# Patient Record
Sex: MALE | Race: WHITE | NOT HISPANIC OR LATINO | Employment: OTHER | URBAN - METROPOLITAN AREA
[De-identification: names, ages, dates, MRNs, and addresses within clinical notes are randomized per-mention and may not be internally consistent; named-entity substitution may affect disease eponyms.]

---

## 2017-02-08 PROBLEM — Z95.5 S/P CORONARY ARTERY STENT PLACEMENT: Status: ACTIVE | Noted: 2017-02-08

## 2017-02-08 PROBLEM — E10.9 DIABETES MELLITUS TYPE 1, CONTROLLED, INSULIN DEPENDENT (HCC): Status: ACTIVE | Noted: 2017-02-08

## 2017-02-08 PROBLEM — Z96.41 INSULIN PUMP IN PLACE: Status: ACTIVE | Noted: 2017-02-08

## 2017-02-08 PROBLEM — N40.0 PROSTATISM: Status: ACTIVE | Noted: 2017-02-08

## 2017-02-10 PROBLEM — E10.69 DM TYPE 1 WITH DIABETIC MIXED HYPERLIPIDEMIA (HCC): Status: ACTIVE | Noted: 2017-02-10

## 2017-02-10 PROBLEM — E03.9 HYPOTHYROIDISM: Status: ACTIVE | Noted: 2017-02-10

## 2017-02-10 PROBLEM — E78.2 DM TYPE 1 WITH DIABETIC MIXED HYPERLIPIDEMIA (HCC): Status: ACTIVE | Noted: 2017-02-10

## 2017-02-24 ENCOUNTER — OFFICE VISIT (OUTPATIENT)
Dept: ENDOCRINOLOGY | Facility: MEDICAL CENTER | Age: 62
End: 2017-02-24
Payer: COMMERCIAL

## 2017-02-24 VITALS
OXYGEN SATURATION: 94 % | DIASTOLIC BLOOD PRESSURE: 82 MMHG | HEIGHT: 69 IN | WEIGHT: 188 LBS | HEART RATE: 71 BPM | SYSTOLIC BLOOD PRESSURE: 144 MMHG | BODY MASS INDEX: 27.85 KG/M2

## 2017-02-24 DIAGNOSIS — Z79.4 ENCOUNTER FOR LONG-TERM (CURRENT) USE OF INSULIN (HCC): ICD-10-CM

## 2017-02-24 DIAGNOSIS — Z96.41 PRESENCE OF INSULIN PUMP: ICD-10-CM

## 2017-02-24 DIAGNOSIS — E10.9 DIABETES MELLITUS TYPE 1, CONTROLLED, INSULIN DEPENDENT (HCC): ICD-10-CM

## 2017-02-24 DIAGNOSIS — Z46.81 FITTING AND ADJUSTMENT OF INSULIN PUMP: ICD-10-CM

## 2017-02-24 PROCEDURE — 99204 OFFICE O/P NEW MOD 45 MIN: CPT | Performed by: PHYSICIAN ASSISTANT

## 2017-02-24 RX ORDER — MULTIVIT-MIN/IRON/FOLIC ACID/K 18-600-40
CAPSULE ORAL
COMMUNITY
End: 2020-09-16

## 2017-02-24 NOTE — MR AVS SNAPSHOT
"        Amaury Garner   2017 4:20 PM   Office Visit   MRN: [id number]    Department:  Endocrinology [facility]   Dept Phone:  [phone]    Description:  Male : 1955   Provider:  Celio Strong PA-C           Reason for Visit     New Patient           Allergies as of 2017     No Known Allergies      You were diagnosed with     Diabetes mellitus type 1, controlled, insulin dependent (CMS-HCC)   [4904284]       Encounter for long-term (current) use of insulin (CMS-HCC)   [V58.67.ICD-9-CM]       Fitting and adjustment of insulin pump   [V53.91.ICD-9-CM]       Presence of insulin pump   [247272]         Vital Signs     Blood Pressure Pulse Height Weight Body Mass Index Oxygen Saturation    144/82 mmHg 71 1.753 m (5' 9.02\") 85.276 kg (188 lb) 27.75 kg/m2 94%    Smoking Status                   Never Smoker            Basic Information     Date Of Birth Sex Race Ethnicity Preferred Language    1955 Male White Non- English      Your appointments     2017  1:00 PM   NEW PATIENT with Brennen Tenorio M.D.   [facility] (--[facility]    [address] [id number] [phone]            Mar 08, 2017 10:30 AM   30 Minute with Calos Ingram M.D.   [facility] (--)    [address] [place] [id number] [phone]              Problem List              ICD-10-CM Priority Class Noted - Resolved    Diabetes mellitus type 1, controlled, insulin dependent (CMS-[facility]) E10.9   2017 - Present    Prostatism N40.0   2017 - Present    Insulin pump in place Z96.41   2017 - Present    S/P coronary artery stent placement Z95.5   2017 - Present    DM type 1 with diabetic mixed hyperlipidemia (CMS-[facility]) E10.69, E78.2   2/10/2017 - Present    Hypothyroidism E03.9   2/10/2017 - Present    Encounter for long-term (current) use of insulin (CMS-HCC) Z79.4   " 2/24/2017 - Present    Fitting and adjustment of insulin pump Z46.81   2/24/2017 - Present    Presence of insulin pump Z96.41   2/24/2017 - Present      Health Maintenance        Date Due Completion Dates    DIABETES MONOFILAMENT / LE EXAM 2/17/1954 ---    RETINAL SCREENING 8/17/1971 ---    FASTING LIPID PROFILE 8/17/1971 ---    SERUM CREATININE 8/17/1971 ---    IMM DTaP/Tdap/Td Vaccine (1 - Tdap) 8/17/1972 ---    IMM PNEUMOCOCCAL 19-64 (ADULT) MEDIUM RISK SERIES (1 of 1 - PPSV23) 8/17/1972 ---    COLONOSCOPY 8/17/2003 ---    IMM ZOSTER VACCINE 8/17/2013 ---    IMM INFLUENZA (1) 9/1/2016 ---    A1C SCREENING 8/8/2017 2/8/2017    URINE ACR / MICROALBUMIN 2/8/2018 2/8/2017            Current Immunizations     No immunizations on file.      Below and/or attached are the medications your provider expects you to take. Review all of your home medications and newly ordered medications with your provider and/or pharmacist. Follow medication instructions as directed by your provider and/or pharmacist. Please keep your medication list with you and share with your provider. Update the information when medications are discontinued, doses are changed, or new medications (including over-the-counter products) are added; and carry medication information at all times in the event of emergency situations     Allergies:  No Known Allergies          Medications  Valid as of: February 24, 2017 -  5:23 PM    Generic Name Brand Name Tablet Size Instructions for use    Ascorbic Acid (Tab) ascorbic acid 500 MG Take 500 mg by mouth every day.        Ascorbic Acid (Cap) Vitamin C 500 MG Take  by mouth.        Aspirin (Tablet Delayed Response) ECOTRIN 81 MG Take 81 mg by mouth every day.        Atorvastatin Calcium (Tab) LIPITOR 40 MG Take 40 mg by mouth every evening.        Cholecalciferol (Cap) Vitamin D 2000 UNITS Take  by mouth.        Ezetimibe (Tab) ZETIA 10 MG Take 10 mg by mouth every day.        Ginkgo Biloba (Tab) Ginkgo Biloba 40  MG Take 120 mg by mouth.        Glucose Blood (Strip) glucose blood  1 Strip by Other route as needed.        Insulin Aspart (Solution) NOVOLOG 100 UNIT/ML INJECT UNDER THE SKIN AS DIRECTED        Levothyroxine Sodium (Tab) SYNTHROID 75 MCG Take 75 mcg by mouth Every morning on an empty stomach.        Lisinopril (Tab) PRINIVIL 10 MG Take 10 mg by mouth every day.        Multiple Vitamin   Take  by mouth.        Probiotic Product   Take  by mouth.        Tamsulosin HCl (Cap) FLOMAX 0.4 MG Take 1 Cap by mouth ONE-HALF HOUR AFTER BREAKFAST.        .                 Medicines prescribed today were sent to:     [facility][id number] - [place], [place] - [address] [address]    Phone: [phone] Fax: [phone]    Open 24 Hours?: No      Medication refill instructions:       If your prescription bottle indicates you have medication refills left, it is not necessary to call your provider’s office. Please contact your pharmacy and they will refill your medication.    If your prescription bottle indicates you do not have any refills left, you may request refills at any time through one of the following ways: The online [employer] system (except Urgent Care), by calling your provider’s office, or by asking your pharmacy to contact your provider’s office with a refill request. Medication refills are processed only during regular business hours and may not be available until the next business day. Your provider may request additional information or to have a follow-up visit with you prior to refilling your medication.   *Please Note: Medication refills are assigned a new Rx number when refilled electronically. Your pharmacy may indicate that no refills were authorized even though a new prescription for the same medication is available at the pharmacy. Please request the medicine by name with the pharmacy before contacting your provider for a refill.        Your To Do  List     Future Labs/Procedures Complete By Expires    CBC WITH DIFFERENTIAL  As directed 2/24/2018    COMP METABOLIC PANEL  As directed 2/24/2018    HEMOGLOBIN A1C  As directed 2/24/2018    LIPID PROFILE  As directed 2/24/2018    MICROALBUMIN CREAT RATIO URINE  As directed 2/24/2018         schoox Access Code: [email or web address]  Expires: 3/10/2017  3:40 PM    schoox  A secure, online tool to manage your health information     [employer]’s schoox® is a secure, online tool that connects you to your personalized health information from the privacy of your home -- day or night - making it very easy for you to manage your healthcare. Once the activation process is completed, you can even access your medical information using the schoox lico, which is available for free in the [employer] Lico store or Google Play store.     schoox provides the following levels of access (as shown below):   My Chart Features   [facility] Primary [facility] Doctor [facility]  Specialists [facility]  Urgent  Care Non-[facility]  Primary Care  Doctor   Email your healthcare team securely and privately 24/7 X X X    Manage appointments: schedule your next appointment; view details of past/upcoming appointments X      Request prescription refills. X      View recent personal medical records, including lab and immunizations X X X X   View health record, including health history, allergies, medications X X X X   Read reports about your outpatient visits, procedures, consult and ER notes X X X X   See your discharge summary, which is a recap of your hospital and/or ER visit that includes your diagnosis, lab results, and care plan. X X       How to register for schoox:  1. Go to  [email or web address].  2. Click on the Sign Up Now box, which takes you to the New Member Sign Up page. You will need to provide the following information:  a. Enter your schoox Access Code exactly as it appears at the top of this page. (You will not need to use this code after you’ve  completed the sign-up process. If you do not sign up before the expiration date, you must request a new code.)   b. Enter your date of birth.   c. Enter your home email address.   d. Click Submit, and follow the next screen’s instructions.  3. Create a [email or web address] ID. This will be your [employer] login ID and cannot be changed, so think of one that is secure and easy to remember.  4. Create a [facility] [employer] password. You can change your password at any time.  5. Enter your Password Reset Question and Answer. This can be used at a later time if you forget your password.   6. Enter your e-mail address. This allows you to receive e-mail notifications when new information is available in [employer] [facility].  7. Click Sign Up. You can now view your health information.    For assistance activating your [employer] account, call [phone]

## 2017-02-24 NOTE — Clinical Note
[facility]  Calos Ingram M.D.  [address]  [place] [address]  Fax: [phone]   Authorization for Release/Disclosure of   Protected Health Information   Name: JESUS ANDUJAR : 1955 SSN: [id number]   Address: [address] [place] [address]  [place] [address] Phone:    [phone] (home) [phone] (work)   I authorize the entity listed below to release/disclose the PHI below to:   [facility]/Calos Ingram M.D. and Celio Strong PA-C   Provider or Entity Name:     Address   City, State, Zip   Phone:      Fax:     Reason for request: continuity of care   Information to be released:    [  ] LAST COLONOSCOPY,  including any PATH REPORT and follow-up  [  ] LAST FIT/COLOGUARD RESULT [  ] LAST DEXA  [  ] LAST MAMMOGRAM  [  ] LAST PAP  [  ] LAST LABS [XX ] RETINA EXAM REPORT  [  ] IMMUNIZATION RECORDS  [  ] Release all info      [  ] Check here and initial the line next to each item to release ALL health information INCLUDING  _____ Care and treatment for drug and / or alcohol abuse  _____ HIV testing, infection status, or AIDS  _____ Genetic Testing    DATES OF SERVICE OR TIME PERIOD TO BE DISCLOSED: _____________  I understand and acknowledge that:  * This Authorization may be revoked at any time by you in writing, except if your health information has already been used or disclosed.  * Your health information that will be used or disclosed as a result of you signing this authorization could be re-disclosed by the recipient. If this occurs, your re-disclosed health information may no longer be protected by State or Federal laws.  * You may refuse to sign this Authorization. Your refusal will not affect your ability to obtain treatment.  * This Authorization becomes effective upon signing and will  on (date) __________.      If no date is indicated, this Authorization will  one (1) year from the signature date.    Name: Jesus Andujar    Signature:   Date:     2017          PLEASE FAX REQUESTED RECORDS BACK TO: [phone]

## 2017-02-25 NOTE — PROGRESS NOTES
New Patient Consult Note  Referred by: Calos Ingram M.D.    Reason for consult: Diabetes Management Type 1 w/ Pump with CGM    HPI:  Amaury Garner is a 61 y.o. old patient who is seeing us today for diabetes care.  This is a pleasant patient with diabetes and I appreciate the opportunity to participate in the care of this patient.  This is a new patient with me today.  Minimed 530G    BG Diary: See medtronic Care link report.      HbA1c 7.6 in office at PCP.      Has been Diabetic for since age 28 has had a pump for 20+ years.    Has a Glucagon pen at home:no    1. Diabetes mellitus type 1, controlled, insulin dependent (CMS-HCC)  He is new to the area off and on for the last 4-5 years.   He is very comfortable doing Temp basal rates for exercise if needed but he seems to not need to do this with cross country skiing he states.     2. Encounter for long-term (current) use of insulin (CMS-HCC)  He is on Novolog a high risk medication    3. Fitting and adjustment of insulin pump  The last visit he saw a person to help him with his pump was a year ago.  His current pump is 2 years old.  He uses the Enlite CGM from [employer]    4. Presence of insulin pump  He is using a pump and CGM and is happy with how he is         ROS:   Constitutional: No change in weight , No fatigue, No night sweats.  HEENT: No Headache.  Eyes:  No blurred vision, No visual changes.  Cardiac: No chest pain, No palpitations.  Resp: No shortness of breath, No cough,   Gastro: No nausea or vomiting, No diarrhea.  Neuro: Denies numbness or tinging in bilateral feet or hands, and no loss of sensation.  Endo: No heat or cold intolerance.  : No polyuria, No polydipsia, No chronic UTI's.  Lower extremities: No lower leg edema bilateral.  All other systems were reviewed and were negative.    Past Medical History:  Patient Active Problem List    Diagnosis Date Noted   • Encounter for long-term (current) use of insulin (CMS-HCC) 02/24/2017   • Fitting  and adjustment of insulin pump 02/24/2017   • Presence of insulin pump 02/24/2017   • DM type 1 with diabetic mixed hyperlipidemia (CMS-HCC) 02/10/2017   • Hypothyroidism 02/10/2017   • Diabetes mellitus type 1, controlled, insulin dependent (CMS-HCC) 02/08/2017   • Prostatism 02/08/2017   • Insulin pump in place 02/08/2017   • S/P coronary artery stent placement 02/08/2017       Past Surgical History:  History reviewed. No pertinent past surgical history.    Allergies:  Review of patient's allergies indicates no known allergies.    Social History:  Social History     Social History   • Marital Status: Single     Spouse Name: N/A   • Number of Children: N/A   • Years of Education: N/A     Occupational History   • Not on file.     Social History Main Topics   • Smoking status: Never Smoker    • Smokeless tobacco: Never Used   • Alcohol Use: Yes   • Drug Use: No   • Sexual Activity: Not on file     Other Topics Concern   • Not on file     Social History Narrative       Family History:  Family History   Problem Relation Age of Onset   • Alzheimer's Disease Mother    • Cancer Father        Medications:    Current outpatient prescriptions:   •  Glucagon, rDNA, 1 MG Kit, 1 Kit by Injection route Once PRN (use for severe Hypoglycemia only) for up to 1 dose., Disp: 2 Kit, Rfl: 3  •  Ascorbic Acid (VITAMIN C) 500 MG Cap, Take  by mouth., Disp: , Rfl:   •  glucose blood strip, 1 Strip by Other route as needed., Disp: 400 Strip, Rfl: 3  •  aspirin EC (ECOTRIN) 81 MG Tablet Delayed Response, Take 81 mg by mouth every day., Disp: , Rfl:   •  lisinopril (PRINIVIL) 10 MG Tab, Take 10 mg by mouth every day., Disp: , Rfl:   •  levothyroxine (SYNTHROID) 75 MCG Tab, Take 75 mcg by mouth Every morning on an empty stomach., Disp: , Rfl:   •  Cholecalciferol (VITAMIN D) 2000 UNITS Cap, Take  by mouth., Disp: , Rfl:   •  Ginkgo Biloba 40 MG Tab, Take 120 mg by mouth., Disp: , Rfl:   •  Multiple Vitamin (MULTI VITAMIN DAILY PO), Take  by  "mouth., Disp: , Rfl:   •  Probiotic Product (PROBIOTIC DAILY PO), Take  by mouth., Disp: , Rfl:   •  NOVOLOG 100 UNIT/ML SOLN, INJECT UNDER THE SKIN AS DIRECTED, Disp: 6 Vial, Rfl: 2  •  atorvastatin (LIPITOR) 40 MG TABS, Take 40 mg by mouth every evening., Disp: , Rfl:   •  ezetimibe (ZETIA) 10 MG TABS, Take 10 mg by mouth every day., Disp: , Rfl:   •  tamsulosin (FLOMAX) 0.4 MG capsule, Take 1 Cap by mouth ONE-HALF HOUR AFTER BREAKFAST., Disp: 30 Cap, Rfl: 3  •  ascorbic acid (ASCORBIC ACID) 500 MG Tab, Take 500 mg by mouth every day., Disp: , Rfl:       Physical Examination:   Vital signs: /82 mmHg  Pulse 71  Ht 1.753 m (5' 9.02\")  Wt 85.276 kg (188 lb)  BMI 27.75 kg/m2  SpO2 94%  General: No distress, cooperative, well dressed and well nourished.   Eyes: No scleral icterus or discharge, No hyposphagma  ENMT: Normal on external inspection of nose, lips, No nasal drainage   Neck: No abnormal masses on inspection  Resp: Normal effort, Bilateral clear to auscultation, No wheezing, No rales  CVS: Regular rate and rhythm, S1 S2 normal, No murmur. No gallop  Extremities: No edema bilateral extremities  Neuro: Alert and oriented  Skin: No rash, No Ulcers  Psych: Normal mood and affect  Foot exam: normal sensation to monofilament testing, normal pulses, no ulcers.  Normal Vibration quantitative sensation test.    Assessment and Plan:    1. Diabetes mellitus type 1, controlled, insulin dependent (CMS-[facility])  He is doing very well.  He needs a stronger carb ration 1/2of the time this is most likely from under counting his carbs and he agrees to this.  1/2 the time on the carelink report he is doing very well.     2. Encounter for long-term (current) use of insulin (CMS-HCC)  He is on Novolog  Is on a high risk medication Insulin and we will continue to follow no hypoglycemic events in the last 6 months    3. Fitting and adjustment of insulin pump  During today's visit we went over the patients insulin pump " Medtronic and settings and the cloud based pump reports.  See report that has been scanned into the chart. This should be considered part of the chart record for today, hand written notes were placed on this record. Recommendations were made and pump settings may have been changed.  See the written notes on the scanned in report.   If this patient has a CGM then a blood glucose was taken in the office today.    4. Presence of insulin pump  I went over the CGM report today as well.   The total time spent seeing this patient today face to face in consultation, and formulating an action plan for this visit was greater than 30 minutes. > Than 50% of this time was spent counseling, discussing problems documented above and below, coordinating care and answering questions by the physician assistant.  We developed a diabetes care plan for this patient today.      Return in about 6 months (around 8/24/2017). 6 months    Blood glucose log: Check BG in the morning when wake up, before lunch or dinner and before bed.  So three times a day.  Always bring BG diary to the next office visit.    Thank you kindly for allowing me to participate in the diabetes care plan for this patient.    Celio Strong PA-C, BC-ADM  02/24/2017    CC:   Calos Ingram M.D.

## 2017-02-28 ENCOUNTER — OFFICE VISIT (OUTPATIENT)
Dept: CARDIOLOGY | Facility: CLINIC | Age: 62
End: 2017-02-28
Payer: COMMERCIAL

## 2017-02-28 VITALS
OXYGEN SATURATION: 95 % | HEART RATE: 85 BPM | SYSTOLIC BLOOD PRESSURE: 130 MMHG | HEIGHT: 69 IN | WEIGHT: 188 LBS | DIASTOLIC BLOOD PRESSURE: 80 MMHG | BODY MASS INDEX: 27.85 KG/M2

## 2017-02-28 DIAGNOSIS — E78.5 DYSLIPIDEMIA: Chronic | ICD-10-CM

## 2017-02-28 DIAGNOSIS — Z95.5 HISTORY OF PLACEMENT OF STENT IN ANTERIOR DESCENDING BRANCH OF LEFT CORONARY ARTERY: ICD-10-CM

## 2017-02-28 DIAGNOSIS — I25.83 CORONARY ARTERY DISEASE DUE TO LIPID RICH PLAQUE: Chronic | ICD-10-CM

## 2017-02-28 DIAGNOSIS — Z95.5 S/P CORONARY ARTERY STENT PLACEMENT: ICD-10-CM

## 2017-02-28 DIAGNOSIS — I25.10 CORONARY ARTERY DISEASE DUE TO LIPID RICH PLAQUE: Chronic | ICD-10-CM

## 2017-02-28 PROCEDURE — 99204 OFFICE O/P NEW MOD 45 MIN: CPT | Mod: 25 | Performed by: INTERNAL MEDICINE

## 2017-02-28 PROCEDURE — 93000 ELECTROCARDIOGRAM COMPLETE: CPT | Performed by: INTERNAL MEDICINE

## 2017-02-28 ASSESSMENT — ENCOUNTER SYMPTOMS
NAUSEA: 0
SHORTNESS OF BREATH: 0
CHILLS: 0
PALPITATIONS: 0
LOSS OF CONSCIOUSNESS: 0
FOCAL WEAKNESS: 0
FALLS: 0
BLURRED VISION: 0
COUGH: 0
DIZZINESS: 0
ABDOMINAL PAIN: 0
CLAUDICATION: 0
SORE THROAT: 0
PND: 0
FEVER: 0
WEAKNESS: 0
HEADACHES: 0
BRUISES/BLEEDS EASILY: 0

## 2017-02-28 NOTE — MR AVS SNAPSHOT
"        Amaury Garner   2017 1:00 PM   Office Visit   MRN: [id number]    Department:  Heart [facility] [place]   Dept Phone:  [phone]    Description:  Male : 1955   Provider:  Brennen Tenorio M.D.           Reason for Visit     New Patient           Allergies as of 2017     No Known Allergies      You were diagnosed with     S/P coronary artery stent placement   [685971]       History of placement of stent in anterior descending branch of left coronary artery   [629721]       Coronary artery disease due to lipid rich plaque   [9963948]       Dyslipidemia   [720518]         Vital Signs     Blood Pressure Pulse Height Weight Body Mass Index Oxygen Saturation    130/80 mmHg 85 1.753 m (5' 9\") 85.276 kg (188 lb) 27.75 kg/m2 95%    Smoking Status                   Never Smoker            Basic Information     Date Of Birth Sex Race Ethnicity Preferred Language    1955 Male White Non- English      Your appointments     Mar 08, 2017 10:30 AM   30 Minute with Calos Ingram M.D.   [facility] (--)    [address] [facility] [address]   [phone]              Problem List              ICD-10-CM Priority Class Noted - Resolved    Diabetes mellitus type 1, controlled, insulin dependent (CMS-HCC) E10.9   2017 - Present    Prostatism N40.0   2017 - Present    Insulin pump in place Z96.41   2017 - Present    Stent in LAD - 2003 with subsequent cutting balloon angioplasty  repeat angio  in setting of abnormal stress showed patent stent (Chronic) Z95.5   2017 - Present    DM type 1 with diabetic mixed hyperlipidemia (CMS-HCC) E10.69, E78.2   2/10/2017 - Present    Hypothyroidism E03.9   2/10/2017 - Present    Encounter for long-term (current) use of insulin (CMS-HCC) Z79.4   2017 - Present    Fitting and adjustment of insulin pump Z46.81   2017 - Present    Presence of insulin pump Z96.41   2017 - " Present    Coronary artery disease due to lipid rich plaque - s/p PCI to LAD with recurrent PCI for in stent restenosis  (Chronic) I25.10, I25.83   2/28/2017 - Present    Dyslipidemia (Chronic) E78.5   Unknown - Present      Health Maintenance        Date Due Completion Dates    DIABETES MONOFILAMENT / LE EXAM 2/17/1954 ---    FASTING LIPID PROFILE 8/17/1971 ---    SERUM CREATININE 8/17/1971 ---    IMM DTaP/Tdap/Td Vaccine (1 - Tdap) 8/17/1972 ---    IMM PNEUMOCOCCAL 19-64 (ADULT) MEDIUM RISK SERIES (1 of 1 - PPSV23) 8/17/1972 ---    COLONOSCOPY 8/17/2003 ---    IMM ZOSTER VACCINE 8/17/2013 ---    IMM INFLUENZA (1) 9/1/2016 ---    A1C SCREENING 8/8/2017 2/8/2017    RETINAL SCREENING 1/12/2018 1/12/2017    URINE ACR / MICROALBUMIN 2/8/2018 2/8/2017            Results       Current Immunizations     No immunizations on file.      Below and/or attached are the medications your provider expects you to take. Review all of your home medications and newly ordered medications with your provider and/or pharmacist. Follow medication instructions as directed by your provider and/or pharmacist. Please keep your medication list with you and share with your provider. Update the information when medications are discontinued, doses are changed, or new medications (including over-the-counter products) are added; and carry medication information at all times in the event of emergency situations     Allergies:  No Known Allergies          Medications  Valid as of: February 28, 2017 -  2:03 PM    Generic Name Brand Name Tablet Size Instructions for use    Ascorbic Acid (Tab) ascorbic acid 500 MG Take 500 mg by mouth every day.        Ascorbic Acid (Cap) Vitamin C 500 MG Take  by mouth.        Aspirin (Tablet Delayed Response) ECOTRIN 81 MG Take 81 mg by mouth every day.        Atorvastatin Calcium (Tab) LIPITOR 40 MG Take 40 mg by mouth every evening.        Cholecalciferol (Cap) Vitamin D 2000 UNITS Take  by mouth.        Ezetimibe  (Tab) ZETIA 10 MG Take 10 mg by mouth every day.        Ginkgo Biloba (Tab) Ginkgo Biloba 40 MG Take 120 mg by mouth.        Glucagon (rDNA) (Kit) Glucagon (rDNA) 1 MG 1 Kit by Injection route Once PRN (use for severe Hypoglycemia only) for up to 1 dose.        Glucose Blood (Strip) glucose blood  1 Strip by Other route as needed.        Insulin Aspart (Solution) NOVOLOG 100 UNIT/ML INJECT UNDER THE SKIN AS DIRECTED        Levothyroxine Sodium (Tab) SYNTHROID 75 MCG Take 75 mcg by mouth Every morning on an empty stomach.        Lisinopril (Tab) PRINIVIL 10 MG Take 10 mg by mouth every day.        Multiple Vitamin   Take  by mouth.        Probiotic Product   Take  by mouth.        Tamsulosin HCl (Cap) FLOMAX 0.4 MG Take 1 Cap by mouth ONE-HALF HOUR AFTER BREAKFAST.        .                 Medicines prescribed today were sent to:     [facility][id number] - [place], [place] - [address] [facility] [address] [place] [address]    [address] [facility] [address]    Phone: [phone] Fax: [phone]    Open 24 Hours?: No      Medication refill instructions:       If your prescription bottle indicates you have medication refills left, it is not necessary to call your provider’s office. Please contact your pharmacy and they will refill your medication.    If your prescription bottle indicates you do not have any refills left, you may request refills at any time through one of the following ways: The online [employer] system (except Urgent Care), by calling your provider’s office, or by asking your pharmacy to contact your provider’s office with a refill request. Medication refills are processed only during regular business hours and may not be available until the next business day. Your provider may request additional information or to have a follow-up visit with you prior to refilling your medication.   *Please Note: Medication refills are assigned a new Rx number when refilled electronically. Your pharmacy may indicate that  no refills were authorized even though a new prescription for the same medication is available at the pharmacy. Please request the medicine by name with the pharmacy before contacting your provider for a refill.           Propable Access Code: [email or web address]  Expires: 3/10/2017  3:40 PM    Propable  A secure, online tool to manage your health information     [employer]’s Propable® is a secure, online tool that connects you to your personalized health information from the privacy of your home -- day or night - making it very easy for you to manage your healthcare. Once the activation process is completed, you can even access your medical information using the Propable lico, which is available for free in the Apple Lico store or Google Play store.     Propable provides the following levels of access (as shown below):   My Chart Features   [facility] Doctor [facility]  Specialists [facility]  Urgent  Care Non-[facility]  Primary Care  Doctor   Email your healthcare team securely and privately 24/7 X X X    Manage appointments: schedule your next appointment; view details of past/upcoming appointments X      Request prescription refills. X      View recent personal medical records, including lab and immunizations X X X X   View health record, including health history, allergies, medications X X X X   Read reports about your outpatient visits, procedures, consult and ER notes X X X X   See your discharge summary, which is a recap of your hospital and/or ER visit that includes your diagnosis, lab results, and care plan. X X       How to register for Propable:  1. Go to  [email or web address].  2. Click on the Sign Up Now box, which takes you to the New Member Sign Up page. You will need to provide the following information:  a. Enter your Propable Access Code exactly as it appears at the top of this page. (You will not need to use this code after you’ve completed the sign-up process. If you do not sign up before the  expiration date, you must request a new code.)   b. Enter your date of birth.   c. Enter your home email address.   d. Click Submit, and follow the next screen’s instructions.  3. Create a GoHome ID. This will be your GoHome login ID and cannot be changed, so think of one that is secure and easy to remember.  4. Create a GoHome password. You can change your password at any time.  5. Enter your Password Reset Question and Answer. This can be used at a later time if you forget your password.   6. Enter your e-mail address. This allows you to receive e-mail notifications when new information is available in GoHome.  7. Click Sign Up. You can now view your health information.    For assistance activating your GoHome account, call [phone]

## 2017-02-28 NOTE — Clinical Note
[facility] [address],   [address]  [place] [address]  Phone: [phone]  Fax: [phone]              Amaury Garner  1955    Encounter Date: 2/28/2017    Brennen Tenorio M.D.          PROGRESS NOTE:  Subjective:   Amaury Garner is a 61 y.o. male who presents today in consultation for Dr. Hdez to reestablish care for a history of coronary artery disease status post stenting    He last saw us I believe in 2009, since then he's lived in [facility] and other places    He has a history of type I diabetes and in 2003 had a heart attack complicated by ventricular arrhythmia rest in the setting of thrombolytics he was transferred to [facility] and underwent successful stenting to the LAD subsequently required in 2004 balloon angioplasty for in-stent restenosis and most recently had angiogram in 2008 in the setting of abnormal stress test which showed stable coronary artery disease single vessel    He's been on long-term anti lipid agents - he was recommended to increase his atorvastatin to 80 mg but felt a little uncomfortable with the dose and so opted to take Zetia which he has done well with.    He remains quite active doing regular exercise    Past Medical History   Diagnosis Date   • Diabetes (CMS-HCC)    • Heart disease    • Measles    • Mumps    • Chickenpox    • Stent in LAD - 5/2003 with subsequent cutting balloon angioplasty 2004 repeat angio 2008 in setting of abnormal stress showed patent stent 2/8/2017   • Coronary artery disease due to lipid rich plaque - s/p PCI to LAD with recurrent PCI for in stent restenosis  2/28/2017   • Dyslipidemia      History reviewed. No pertinent past surgical history.  Family History   Problem Relation Age of Onset   • Alzheimer's Disease Mother    • Cancer Father      History   Smoking status   • Never Smoker    Smokeless tobacco   • Never Used     No Known  Allergies  Outpatient Encounter Prescriptions as of 2/28/2017   Medication Sig Dispense Refill   • Glucagon, rDNA, 1 MG Kit 1 Kit by Injection route Once PRN (use for severe Hypoglycemia only) for up to 1 dose. 2 Kit 3   • Ascorbic Acid (VITAMIN C) 500 MG Cap Take  by mouth.     • glucose blood strip 1 Strip by Other route as needed. 400 Strip 3   • aspirin EC (ECOTRIN) 81 MG Tablet Delayed Response Take 81 mg by mouth every day.     • lisinopril (PRINIVIL) 10 MG Tab Take 10 mg by mouth every day.     • levothyroxine (SYNTHROID) 75 MCG Tab Take 75 mcg by mouth Every morning on an empty stomach.     • tamsulosin (FLOMAX) 0.4 MG capsule Take 1 Cap by mouth ONE-HALF HOUR AFTER BREAKFAST. 30 Cap 3   • ascorbic acid (ASCORBIC ACID) 500 MG Tab Take 500 mg by mouth every day.     • Ginkgo Biloba 40 MG Tab Take 120 mg by mouth.     • Multiple Vitamin (MULTI VITAMIN DAILY PO) Take  by mouth.     • Probiotic Product (PROBIOTIC DAILY PO) Take  by mouth.     • NOVOLOG 100 UNIT/ML SOLN INJECT UNDER THE SKIN AS DIRECTED 6 Vial 2   • atorvastatin (LIPITOR) 40 MG TABS Take 40 mg by mouth every evening.     • ezetimibe (ZETIA) 10 MG TABS Take 10 mg by mouth every day.     • Cholecalciferol (VITAMIN D) 2000 UNITS Cap Take  by mouth.       No facility-administered encounter medications on file as of 2/28/2017.     Review of Systems   Constitutional: Negative for fever and chills.   HENT: Negative for sore throat.    Eyes: Negative for blurred vision.   Respiratory: Negative for cough and shortness of breath.    Cardiovascular: Negative for chest pain, palpitations, claudication, leg swelling and PND.   Gastrointestinal: Negative for nausea and abdominal pain.   Musculoskeletal: Negative for falls.   Skin: Negative for rash.   Neurological: Negative for dizziness, focal weakness, loss of consciousness, weakness and headaches.   Endo/Heme/Allergies: Does not bruise/bleed easily.        Objective:   /80 mmHg  Pulse 85  Ht 1.753 m  "(5' 9\")  Wt 85.276 kg (188 lb)  BMI 27.75 kg/m2  SpO2 95%    Physical Exam   Constitutional: No distress.   HENT:   Mouth/Throat: Oropharynx is clear and moist.   Eyes: No scleral icterus.   Neck: Neck supple. No JVD present.   Cardiovascular: Normal rate, regular rhythm, normal heart sounds and intact distal pulses.  Exam reveals no gallop and no friction rub.    No murmur heard.  Pulmonary/Chest: Effort normal. He has no rales.   Abdominal: Soft. Bowel sounds are normal. There is no tenderness.   Musculoskeletal: He exhibits no edema.   Neurological: He is alert.   Skin: No rash noted. He is not diaphoretic.   Psychiatric: He has a normal mood and affect.     His EKG shows sinus rhythm    Labs show dyslipidemia on appropriate statin    Assessment:     1. S/P coronary artery stent placement  EKG   2. History of placement of stent in anterior descending branch of left coronary artery     3. Coronary artery disease due to lipid rich plaque - s/p PCI to LAD with recurrent PCI for in stent restenosis      4. Dyslipidemia         Medical Decision Making:  Today's Assessment / Status / Plan:     It was my pleasure to meet with Mr. Garner.    Fortunately we had his prior records he does have some intervening records from you about what sounds like he had another stress test which was normal.    I reinforce importance of anti-lipid agents and he is done well with his current atorvastatin and Zetia this is likely similar benefit to switching him to rosuvastatin 40 mg which is an option    His blood pressure is well controlled    His diabetes well controlled    I will see Mr. Garner back in 1 year time and encouraged him to follow up with us over the phone or e-mail using my MyChart as issues arise.    It is my pleasure to participate in the care of Mr. Garner.  Please do not hesitate to contact me with questions or concerns.    Brennen Tenorio MD PhD FAC  Cardiologist [facility] " [facility]        Calos Ingram M.D.  [address]  [facility] [address]  VIA In Basket

## 2017-03-01 LAB — EKG IMPRESSION: NORMAL

## 2017-03-01 NOTE — PROGRESS NOTES
Subjective:   Amaury Garner is a 61 y.o. male who presents today in consultation for Dr. Hdez to reestablish care for a history of coronary artery disease status post stenting    He last saw us I believe in 2009, since then he's lived in [facility] and other places    He has a history of type I diabetes and in 2003 had a heart attack complicated by ventricular arrhythmia rest in the setting of thrombolytics he was transferred to [facility] and underwent successful stenting to the LAD subsequently required in 2004 balloon angioplasty for in-stent restenosis and most recently had angiogram in 2008 in the setting of abnormal stress test which showed stable coronary artery disease single vessel    He's been on long-term anti lipid agents - he was recommended to increase his atorvastatin to 80 mg but felt a little uncomfortable with the dose and so opted to take Zetia which he has done well with.    He remains quite active doing regular exercise    Past Medical History   Diagnosis Date   • Diabetes (CMS-[facility])    • Heart disease    • Measles    • Mumps    • Chickenpox    • Stent in LAD - 5/2003 with subsequent cutting balloon angioplasty 2004 repeat angio 2008 in setting of abnormal stress showed patent stent 2/8/2017   • Coronary artery disease due to lipid rich plaque - s/p PCI to LAD with recurrent PCI for in stent restenosis  2/28/2017   • Dyslipidemia      History reviewed. No pertinent past surgical history.  Family History   Problem Relation Age of Onset   • Alzheimer's Disease Mother    • Cancer Father      History   Smoking status   • Never Smoker    Smokeless tobacco   • Never Used     No Known Allergies  Outpatient Encounter Prescriptions as of 2/28/2017   Medication Sig Dispense Refill   • Glucagon, rDNA, 1 MG Kit 1 Kit by Injection route Once PRN (use for severe Hypoglycemia only) for up to 1 dose. 2 Kit 3   • Ascorbic Acid (VITAMIN C) 500 MG Cap Take  by mouth.     • glucose blood strip 1  "Strip by Other route as needed. 400 Strip 3   • aspirin EC (ECOTRIN) 81 MG Tablet Delayed Response Take 81 mg by mouth every day.     • lisinopril (PRINIVIL) 10 MG Tab Take 10 mg by mouth every day.     • levothyroxine (SYNTHROID) 75 MCG Tab Take 75 mcg by mouth Every morning on an empty stomach.     • tamsulosin (FLOMAX) 0.4 MG capsule Take 1 Cap by mouth ONE-HALF HOUR AFTER BREAKFAST. 30 Cap 3   • ascorbic acid (ASCORBIC ACID) 500 MG Tab Take 500 mg by mouth every day.     • Ginkgo Biloba 40 MG Tab Take 120 mg by mouth.     • Multiple Vitamin (MULTI VITAMIN DAILY PO) Take  by mouth.     • Probiotic Product (PROBIOTIC DAILY PO) Take  by mouth.     • NOVOLOG 100 UNIT/ML SOLN INJECT UNDER THE SKIN AS DIRECTED 6 Vial 2   • atorvastatin (LIPITOR) 40 MG TABS Take 40 mg by mouth every evening.     • ezetimibe (ZETIA) 10 MG TABS Take 10 mg by mouth every day.     • Cholecalciferol (VITAMIN D) 2000 UNITS Cap Take  by mouth.       No facility-administered encounter medications on file as of 2/28/2017.     Review of Systems   Constitutional: Negative for fever and chills.   HENT: Negative for sore throat.    Eyes: Negative for blurred vision.   Respiratory: Negative for cough and shortness of breath.    Cardiovascular: Negative for chest pain, palpitations, claudication, leg swelling and PND.   Gastrointestinal: Negative for nausea and abdominal pain.   Musculoskeletal: Negative for falls.   Skin: Negative for rash.   Neurological: Negative for dizziness, focal weakness, loss of consciousness, weakness and headaches.   Endo/Heme/Allergies: Does not bruise/bleed easily.        Objective:   /80 mmHg  Pulse 85  Ht 1.753 m (5' 9\")  Wt 85.276 kg (188 lb)  BMI 27.75 kg/m2  SpO2 95%    Physical Exam   Constitutional: No distress.   HENT:   Mouth/Throat: Oropharynx is clear and moist.   Eyes: No scleral icterus.   Neck: Neck supple. No JVD present.   Cardiovascular: Normal rate, regular rhythm, normal heart sounds and " intact distal pulses.  Exam reveals no gallop and no friction rub.    No murmur heard.  Pulmonary/Chest: Effort normal. He has no rales.   Abdominal: Soft. Bowel sounds are normal. There is no tenderness.   Musculoskeletal: He exhibits no edema.   Neurological: He is alert.   Skin: No rash noted. He is not diaphoretic.   Psychiatric: He has a normal mood and affect.     His EKG shows sinus rhythm    Labs show dyslipidemia on appropriate statin    Assessment:     1. S/P coronary artery stent placement  EKG   2. History of placement of stent in anterior descending branch of left coronary artery     3. Coronary artery disease due to lipid rich plaque - s/p PCI to LAD with recurrent PCI for in stent restenosis      4. Dyslipidemia         Medical Decision Making:  Today's Assessment / Status / Plan:     It was my pleasure to meet with Mr. Garner.    Fortunately we had his prior records he does have some intervening records from you about what sounds like he had another stress test which was normal.    I reinforce importance of anti-lipid agents and he is done well with his current atorvastatin and Zetia this is likely similar benefit to switching him to rosuvastatin 40 mg which is an option    His blood pressure is well controlled    His diabetes well controlled    I will see Mr. Garner back in 1 year time and encouraged him to follow up with us over the phone or e-mail using my MyChart as issues arise.    It is my pleasure to participate in the care of Mr. Garner.  Please do not hesitate to contact me with questions or concerns.    Brennen Tenorio MD PhD FACC  Cardiologist [facility]

## 2017-04-17 ENCOUNTER — OFFICE VISIT (OUTPATIENT)
Dept: ENDOCRINOLOGY | Facility: MEDICAL CENTER | Age: 62
End: 2017-04-17
Payer: COMMERCIAL

## 2017-04-17 VITALS
HEART RATE: 88 BPM | SYSTOLIC BLOOD PRESSURE: 126 MMHG | DIASTOLIC BLOOD PRESSURE: 80 MMHG | BODY MASS INDEX: 27.55 KG/M2 | WEIGHT: 186 LBS | HEIGHT: 69 IN | OXYGEN SATURATION: 97 %

## 2017-04-17 DIAGNOSIS — Z79.4 ENCOUNTER FOR LONG-TERM (CURRENT) USE OF INSULIN (HCC): ICD-10-CM

## 2017-04-17 DIAGNOSIS — Z46.81 FITTING AND ADJUSTMENT OF INSULIN PUMP: ICD-10-CM

## 2017-04-17 DIAGNOSIS — Z96.41 PRESENCE OF INSULIN PUMP: ICD-10-CM

## 2017-04-17 DIAGNOSIS — E10.69 DM TYPE 1 WITH DIABETIC MIXED HYPERLIPIDEMIA (HCC): ICD-10-CM

## 2017-04-17 DIAGNOSIS — E78.2 DM TYPE 1 WITH DIABETIC MIXED HYPERLIPIDEMIA (HCC): ICD-10-CM

## 2017-04-17 PROCEDURE — 95251 CONT GLUC MNTR ANALYSIS I&R: CPT | Performed by: PHYSICIAN ASSISTANT

## 2017-04-17 PROCEDURE — 99215 OFFICE O/P EST HI 40 MIN: CPT | Performed by: PHYSICIAN ASSISTANT

## 2017-04-17 NOTE — MR AVS SNAPSHOT
"        Amaury Garner   2017 2:40 PM   Office Visit   MRN: [id number]    Department:  Endocrinology [facility]   Dept Phone:  [phone]    Description:  Male : 1955   Provider:  Celio Strong PA-C           Reason for Visit     Follow-Up           Allergies as of 2017     No Known Allergies      You were diagnosed with     DM type 1 with diabetic mixed hyperlipidemia (CMS-HCC)   [058287]       Encounter for long-term (current) use of insulin (CMS-HCC)   [V58.67.ICD-9-CM]       Fitting and adjustment of insulin pump   [V53.91.ICD-9-CM]       Presence of insulin pump   [988291]         Vital Signs     Blood Pressure Pulse Height Weight Body Mass Index Oxygen Saturation    126/80 mmHg 88 1.753 m (5' 9\") 84.369 kg (186 lb) 27.45 kg/m2 97%    Smoking Status                   Never Smoker            Basic Information     Date Of Birth Sex Race Ethnicity Preferred Language    1955 Male White Non- English      Your appointments     May 10, 2017  2:00 PM   30 Minute with Calos Ingram M.D.   [facility] (--)    [address] [facility] [address] [phone]              Problem List              ICD-10-CM Priority Class Noted - Resolved    Diabetes mellitus type 1, controlled, insulin dependent (CMS-HCC) E10.9   2017 - Present    Prostatism N40.0   2017 - Present    Insulin pump in place Z96.41   2017 - Present    Stent in LAD - 2003 with subsequent cutting balloon angioplasty  repeat angio  in setting of abnormal stress showed patent stent (Chronic) Z95.5   2017 - Present    DM type 1 with diabetic mixed hyperlipidemia (CMS-HCC) E10.69, E78.2   2/10/2017 - Present    Hypothyroidism E03.9   2/10/2017 - Present    Encounter for long-term (current) use of insulin (CMS-HCC) Z79.4   2017 - Present    Fitting and adjustment of insulin pump Z46.81   2017 - Present    Presence of insulin pump Z96.41   " 2/24/2017 - Present    Coronary artery disease due to lipid rich plaque - s/p PCI to LAD with recurrent PCI for in stent restenosis  (Chronic) I25.10, I25.83   2/28/2017 - Present    Dyslipidemia (Chronic) E78.5   Unknown - Present      Health Maintenance        Date Due Completion Dates    DIABETES MONOFILAMENT / LE EXAM 2/17/1954 ---    IMM DTaP/Tdap/Td Vaccine (1 - Tdap) 8/17/1972 ---    IMM PNEUMOCOCCAL 19-64 (ADULT) MEDIUM RISK SERIES (1 of 1 - PPSV23) 8/17/1972 ---    COLONOSCOPY 8/17/2003 ---    IMM ZOSTER VACCINE 8/17/2013 ---    A1C SCREENING 9/4/2017 3/4/2017, 2/8/2017    RETINAL SCREENING 1/12/2018 1/12/2017    FASTING LIPID PROFILE 3/4/2018 3/4/2017    URINE ACR / MICROALBUMIN 3/4/2018 3/4/2017, 2/8/2017    SERUM CREATININE 3/4/2018 3/4/2017            Current Immunizations     No immunizations on file.      Below and/or attached are the medications your provider expects you to take. Review all of your home medications and newly ordered medications with your provider and/or pharmacist. Follow medication instructions as directed by your provider and/or pharmacist. Please keep your medication list with you and share with your provider. Update the information when medications are discontinued, doses are changed, or new medications (including over-the-counter products) are added; and carry medication information at all times in the event of emergency situations     Allergies:  No Known Allergies          Medications  Valid as of: April 17, 2017 -  4:19 PM    Generic Name Brand Name Tablet Size Instructions for use    Ascorbic Acid (Tab) ascorbic acid 500 MG Take 500 mg by mouth every day.        Ascorbic Acid (Cap) Vitamin C 500 MG Take  by mouth.        Aspirin (Tablet Delayed Response) ECOTRIN 81 MG Take 81 mg by mouth every day.        Atorvastatin Calcium (Tab) LIPITOR 40 MG Take 1 Tab by mouth every day.        Cholecalciferol (Cap) Vitamin D 2000 UNITS Take  by mouth.        Ezetimibe (Tab) ZETIA 10 MG  Take 10 mg by mouth every day.        Ginkgo Biloba (Tab) Ginkgo Biloba 40 MG Take 120 mg by mouth.        Glucagon (rDNA) (Kit) Glucagon (rDNA) 1 MG 1 Kit by Injection route Once PRN (use for severe Hypoglycemia only) for up to 1 dose.        Glucose Blood (Strip) glucose blood  1 Strip by Other route as needed.        Insulin Aspart (Solution) NOVOLOG 100 UNIT/ML INJECT UNDER THE SKIN AS DIRECTED        Levothyroxine Sodium (Tab) SYNTHROID 75 MCG Take 1 Tab by mouth Every morning on an empty stomach.        Lisinopril (Tab) PRINIVIL 10 MG Take 1 Tab by mouth every day.        Multiple Vitamin   Take  by mouth.        Probiotic Product   Take  by mouth.        Tamsulosin HCl (Cap) FLOMAX 0.4 MG Take 1 Cap by mouth ONE-HALF HOUR AFTER BREAKFAST.        .                 Medicines prescribed today were sent to:     [facility][id number] - [place], [place] - [address] [place] [address]    [address] [facility] [facility] [address]    Phone: [phone] Fax: [phone]    Open 24 Hours?: No      Medication refill instructions:       If your prescription bottle indicates you have medication refills left, it is not necessary to call your provider’s office. Please contact your pharmacy and they will refill your medication.    If your prescription bottle indicates you do not have any refills left, you may request refills at any time through one of the following ways: The online [employer] system (except Urgent Care), by calling your provider’s office, or by asking your pharmacy to contact your provider’s office with a refill request. Medication refills are processed only during regular business hours and may not be available until the next business day. Your provider may request additional information or to have a follow-up visit with you prior to refilling your medication.   *Please Note: Medication refills are assigned a new Rx number when refilled electronically. Your pharmacy may indicate that no refills were  authorized even though a new prescription for the same medication is available at the pharmacy. Please request the medicine by name with the pharmacy before contacting your provider for a refill.           [employer] Access Code: Activation code not generated  Current [email or web address] Status: Active

## 2017-04-17 NOTE — PROGRESS NOTES
Return to office Patient Consult Note  Referred by: Calos Ingram M.D.    Reason for consult: Diabetes Management Type 1 w/ Pump and CGM    HPI:  Amaury Garner is a 61 y.o. old patient who is seeing us today for diabetes care.  This is a pleasant patient with diabetes and I appreciate the opportunity to participate in the care of this patient.    BG Diary:4/17/2017 - see carelink report and CGM      BG Diary: See [employer] Care link report.      HbA1c 7.6 in office at PCP.      Has been Diabetic for since age 28 has had a pump for 20+ years.     Has a Glucagon pen at home:no        1. DM type 1 with diabetic mixed hyperlipidemia (CMS-HCC)  He is new to the area off and on for the last 4-5 years.   He is very comfortable doing Temp basal rates for exercise if needed but he seems to not need to do this with cross country skiing he states.     2. Encounter for long-term (current) use of insulin (CMS-HCC)  Basal rate  Midnight to 5am 1.0  5am to 8am 1.3  8am to noon 1.1  Noon to 6pm 1.1  6pm to midnight 1.0    Carb ratio 1:7 (changed on 4/17/17 to)  Midnight to 5pm 1:6.8  5pm to midnight 7.0    Basla 56%  Bolus 44%    Average BG is 179 =/- 76  TDD insulin: 46.2 +/- 5.9    3. Fitting and adjustment of insulin pump  Patient was seen in conjunction with CDE/YANA Frazier from [employer].  His CDE services are not being billed separate.       4. Presence of insulin pump  We discussed the sensor and causing a rash and multiple options were discussed see the pump report        ROS:   Constitutional: No night sweats.  Eyes:  No visual changes.  Cardiac: No chest pain, No palpitations or racing heart rate.  Resp: No shortness of breath, No cough,   Gi: No Diarrhea    All other systems were reviewed and were/are negative.  The ROS was revised/revisited during this office visit from the patients first office visit with me on 2/24/17 . Please review the full ROS during the first office visit.    Past Medical History:  Patient  Active Problem List    Diagnosis Date Noted   • Coronary artery disease due to lipid rich plaque - s/p PCI to LAD with recurrent PCI for in stent restenosis  02/28/2017   • Dyslipidemia    • Encounter for long-term (current) use of insulin (CMS-HCC) 02/24/2017   • Fitting and adjustment of insulin pump 02/24/2017   • Presence of insulin pump 02/24/2017   • DM type 1 with diabetic mixed hyperlipidemia (CMS-HCC) 02/10/2017   • Hypothyroidism 02/10/2017   • Diabetes mellitus type 1, controlled, insulin dependent (CMS-HCC) 02/08/2017   • Prostatism 02/08/2017   • Insulin pump in place 02/08/2017   • Stent in LAD - 5/2003 with subsequent cutting balloon angioplasty 2004 repeat angio 2008 in setting of abnormal stress showed patent stent 02/08/2017       Past Surgical History:  History reviewed. No pertinent past surgical history.    Allergies:  Review of patient's allergies indicates no known allergies.    Social History:  Social History     Social History   • Marital Status: Single     Spouse Name: N/A   • Number of Children: N/A   • Years of Education: N/A     Occupational History   • Not on file.     Social History Main Topics   • Smoking status: Never Smoker    • Smokeless tobacco: Never Used   • Alcohol Use: Yes   • Drug Use: No   • Sexual Activity: Not on file     Other Topics Concern   • Not on file     Social History Narrative       Family History:  Family History   Problem Relation Age of Onset   • Alzheimer's Disease Mother    • Cancer Father        Medications:    Current outpatient prescriptions:   •  lisinopril (PRINIVIL) 10 MG Tab, Take 1 Tab by mouth every day., Disp: 90 Tab, Rfl: 3  •  levothyroxine (SYNTHROID) 75 MCG Tab, Take 1 Tab by mouth Every morning on an empty stomach., Disp: 90 Tab, Rfl: 3  •  atorvastatin (LIPITOR) 40 MG Tab, Take 1 Tab by mouth every day., Disp: 90 Tab, Rfl: 3  •  tamsulosin (FLOMAX) 0.4 MG capsule, Take 1 Cap by mouth ONE-HALF HOUR AFTER BREAKFAST., Disp: 90 Cap, Rfl: 3  •   "Glucagon, rDNA, 1 MG Kit, 1 Kit by Injection route Once PRN (use for severe Hypoglycemia only) for up to 1 dose., Disp: 2 Kit, Rfl: 3  •  Ascorbic Acid (VITAMIN C) 500 MG Cap, Take  by mouth., Disp: , Rfl:   •  glucose blood strip, 1 Strip by Other route as needed., Disp: 400 Strip, Rfl: 3  •  aspirin EC (ECOTRIN) 81 MG Tablet Delayed Response, Take 81 mg by mouth every day., Disp: , Rfl:   •  ascorbic acid (ASCORBIC ACID) 500 MG Tab, Take 500 mg by mouth every day., Disp: , Rfl:   •  Cholecalciferol (VITAMIN D) 2000 UNITS Cap, Take  by mouth., Disp: , Rfl:   •  Ginkgo Biloba 40 MG Tab, Take 120 mg by mouth., Disp: , Rfl:   •  Multiple Vitamin (MULTI VITAMIN DAILY PO), Take  by mouth., Disp: , Rfl:   •  Probiotic Product (PROBIOTIC DAILY PO), Take  by mouth., Disp: , Rfl:   •  NOVOLOG 100 UNIT/ML SOLN, INJECT UNDER THE SKIN AS DIRECTED, Disp: 6 Vial, Rfl: 2  •  ezetimibe (ZETIA) 10 MG TABS, Take 10 mg by mouth every day., Disp: , Rfl:         Physical Examination:   Vital signs: /80 mmHg  Pulse 88  Ht 1.753 m (5' 9\")  Wt 84.369 kg (186 lb)  BMI 27.45 kg/m2  SpO2 97%  General: No distress, cooperative, well dressed and well nourished.   Eyes: No scleral icterus or discharge, No hyposphagma  ENMT: Normal on external inspection of nose, lips, No nasal drainage   Neck: No abnormal masses on inspection  Resp: Normal effort, Bilateral clear to auscultation, No wheezing, No rales  CVS: Regular rate and rhythm, S1 S2 normal, No murmur. No gallop  Extremities: No edema bilateral extremities  Neuro: Alert and oriented  Skin: No rash, No Ulcers  Psych: Normal mood and affect      Assessment and Plan:    1. DM type 1 with diabetic mixed hyperlipidemia (CMS-HCC)  Patient was seen in conjunction with CDE/YANA Frazier from [facility].  His CDE services are not being billed separate.     2. Encounter for long-term (current) use of insulin (CMS-HCC)  Is on a high risk medication Insulin and we will continue to follow no " hypoglycemic events since last visit    3. Fitting and adjustment of insulin pump  During today's visit we went over the patients insulin pump [employer] and settings and the cloud based pump reports.  See report that has been scanned into the chart. This should be considered part of the chart record for today, hand written notes were placed on this record. Recommendations were made and pump settings may have been changed.  See the written notes on the scanned in report.   If this patient has a CGM then a blood glucose was taken in the office today.    4. Presence of insulin pump  The total time spent seeing this patient today face to face in consultation, and formulating an action plan for this visit was greater than 50 minutes. > Than 50% of this time was spent counseling, discussing problems documented above and below, coordinating care and answering questions by the physician assistant.  We developed a diabetes care plan for this patient today.      Return in about 1 year (around 4/17/2018).    Blood glucose log: Check BG in the morning when wake up, before lunch or dinner and before bed.  So three times a day.  Always bring BG diary to the next office visit.       Thank you kindly for allowing me to participate in the diabetes care plan for this patient.    Pino Strong PA-C, BC-ADM  04/17/2017    CC:   Calos Ingram M.D.

## 2017-05-12 PROBLEM — Z86.010 HISTORY OF COLONIC POLYPS: Status: ACTIVE | Noted: 2017-05-12

## 2017-05-12 PROBLEM — Z96.41 INSULIN PUMP IN PLACE: Status: RESOLVED | Noted: 2017-02-08 | Resolved: 2017-05-12

## 2017-05-12 PROBLEM — Z79.4 ENCOUNTER FOR LONG-TERM (CURRENT) USE OF INSULIN (HCC): Status: RESOLVED | Noted: 2017-02-24 | Resolved: 2017-05-12

## 2017-05-12 PROBLEM — Z46.81 FITTING AND ADJUSTMENT OF INSULIN PUMP: Status: RESOLVED | Noted: 2017-02-24 | Resolved: 2017-05-12

## 2017-05-12 PROBLEM — Z86.0100 HISTORY OF COLONIC POLYPS: Status: ACTIVE | Noted: 2017-05-12

## 2017-06-21 ENCOUNTER — PATIENT MESSAGE (OUTPATIENT)
Dept: ENDOCRINOLOGY | Facility: MEDICAL CENTER | Age: 62
End: 2017-06-21

## 2017-08-16 PROBLEM — J30.1 NON-SEASONAL ALLERGIC RHINITIS DUE TO POLLEN: Status: ACTIVE | Noted: 2017-08-16

## 2018-01-17 ENCOUNTER — OFFICE VISIT (OUTPATIENT)
Dept: CARDIOLOGY | Facility: MEDICAL CENTER | Age: 63
End: 2018-01-17
Payer: COMMERCIAL

## 2018-01-17 VITALS
OXYGEN SATURATION: 96 % | BODY MASS INDEX: 28.44 KG/M2 | HEART RATE: 78 BPM | DIASTOLIC BLOOD PRESSURE: 60 MMHG | SYSTOLIC BLOOD PRESSURE: 122 MMHG | WEIGHT: 192 LBS | HEIGHT: 69 IN

## 2018-01-17 DIAGNOSIS — Z95.5 HISTORY OF PLACEMENT OF STENT IN ANTERIOR DESCENDING BRANCH OF LEFT CORONARY ARTERY: Chronic | ICD-10-CM

## 2018-01-17 DIAGNOSIS — I25.10 CORONARY ARTERY DISEASE DUE TO LIPID RICH PLAQUE: Chronic | ICD-10-CM

## 2018-01-17 DIAGNOSIS — I25.83 CORONARY ARTERY DISEASE DUE TO LIPID RICH PLAQUE: Chronic | ICD-10-CM

## 2018-01-17 DIAGNOSIS — E78.5 DYSLIPIDEMIA: Chronic | ICD-10-CM

## 2018-01-17 PROCEDURE — 99214 OFFICE O/P EST MOD 30 MIN: CPT | Performed by: INTERNAL MEDICINE

## 2018-01-19 ENCOUNTER — TELEPHONE (OUTPATIENT)
Dept: ENDOCRINOLOGY | Facility: MEDICAL CENTER | Age: 63
End: 2018-01-19

## 2018-01-19 NOTE — TELEPHONE ENCOUNTER
1. Caller Name: [facility]                                         Call Back Number: [phone]      Patient approves a detailed voicemail message: N\A    [facility] stated the PT insurance wont pay for Humalog and will only pay for Novolog can we sent another persription for Novolog instead of Humalog?

## 2018-01-29 ASSESSMENT — ENCOUNTER SYMPTOMS
COUGH: 0
NAUSEA: 0
CHILLS: 0
ABDOMINAL PAIN: 0
DIZZINESS: 0
PND: 0
PALPITATIONS: 0
BLURRED VISION: 0
SORE THROAT: 0
FALLS: 0
WEAKNESS: 0
FEVER: 0
FOCAL WEAKNESS: 0
SHORTNESS OF BREATH: 0
CLAUDICATION: 0
BRUISES/BLEEDS EASILY: 0

## 2018-01-29 NOTE — PROGRESS NOTES
Subjective:   Amaury Garner is a 62 y.o. male who presents today for follow-up of his history of coronary disease status post angioplasty and stenting remotely    He's been doing okay he was wondering about recurrence of coronary disease and he follows closely with all of his providers    Past Medical History:   Diagnosis Date   • Chickenpox    • Coronary artery disease due to lipid rich plaque - s/p PCI to LAD with recurrent PCI for in stent restenosis  2/28/2017   • Diabetes (CMS-HCC)    • Dyslipidemia    • Heart disease    • Measles    • Mumps    • Stent in LAD - 5/2003 with subsequent cutting balloon angioplasty 2004 repeat angio 2008 in setting of abnormal stress showed patent stent 2/8/2017     History reviewed. No pertinent surgical history.  Family History   Problem Relation Age of Onset   • Alzheimer's Disease Mother    • Cancer Father      History   Smoking Status   • Never Smoker   Smokeless Tobacco   • Never Used     No Known Allergies  Outpatient Encounter Prescriptions as of 1/17/2018   Medication Sig Dispense Refill   • Apoaequorin (PREVAGEN) 10 MG Cap Take  by mouth.     • [DISCONTINUED] HUMALOG 100 UNIT/ML Solution Inject 40-70 Units as instructed 3 times a day before meals. 20 mL 10   • fluticasone (FLONASE) 50 MCG/ACT nasal spray Spray 1 Spray in nose every day. 16 g 3   • glucose blood (ELVIRA CONTOUR NEXT TEST) strip 1 Strip by Other route as needed (insulin dependent checking 6-8 times a day). 150 Strip 6   • glucose blood strip 1 Strip by Other route as needed. 400 Strip 3   • ezetimibe (ZETIA) 10 MG Tab Take 1 Tab by mouth every day. 90 Tab 3   • lisinopril (PRINIVIL) 10 MG Tab Take 1 Tab by mouth every day. 90 Tab 3   • levothyroxine (SYNTHROID) 75 MCG Tab Take 1 Tab by mouth Every morning on an empty stomach. 90 Tab 3   • atorvastatin (LIPITOR) 40 MG Tab Take 1 Tab by mouth every day. 90 Tab 3   • tamsulosin (FLOMAX) 0.4 MG capsule Take 1 Cap by mouth ONE-HALF HOUR AFTER BREAKFAST. 90 Cap 3  "  • aspirin EC (ECOTRIN) 81 MG Tablet Delayed Response Take 81 mg by mouth every day.     • ascorbic acid (ASCORBIC ACID) 500 MG Tab Take 500 mg by mouth every day.     • Ginkgo Biloba 40 MG Tab Take 120 mg by mouth.     • Multiple Vitamin (MULTI VITAMIN DAILY PO) Take  by mouth.     • Probiotic Product (PROBIOTIC DAILY PO) Take  by mouth.     • Glucagon, rDNA, 1 MG Kit 1 Kit by Injection route Once PRN (use for severe Hypoglycemia only) for up to 1 dose. 2 Kit 3   • Ascorbic Acid (VITAMIN C) 500 MG Cap Take  by mouth.     • Cholecalciferol (VITAMIN D) 2000 UNITS Cap Take  by mouth.       No facility-administered encounter medications on file as of 1/17/2018.      Review of Systems   Constitutional: Negative for chills and fever.   HENT: Negative for sore throat.    Eyes: Negative for blurred vision.   Respiratory: Negative for cough and shortness of breath.    Cardiovascular: Negative for chest pain, palpitations, claudication, leg swelling and PND.   Gastrointestinal: Negative for abdominal pain and nausea.   Musculoskeletal: Negative for falls and joint pain.   Skin: Negative for rash.   Neurological: Negative for dizziness, focal weakness and weakness.   Endo/Heme/Allergies: Does not bruise/bleed easily.        Objective:   /60   Pulse 78   Ht 1.753 m (5' 9\")   Wt 87.1 kg (192 lb)   SpO2 96%   BMI 28.35 kg/m²     Physical Exam   Constitutional: No distress.   HENT:   Mouth/Throat: Oropharynx is clear and moist.   Eyes: No scleral icterus.   Neck: Neck supple. No JVD present.   Cardiovascular: Normal rate, regular rhythm, normal heart sounds and intact distal pulses.  Exam reveals no gallop and no friction rub.    No murmur heard.  Pulmonary/Chest: Effort normal. He has no rales.   Abdominal: Soft. Bowel sounds are normal. There is no tenderness.   Musculoskeletal: He exhibits no edema.   Neurological: He is alert.   Skin: No rash noted. He is not diaphoretic.   Psychiatric: He has a normal mood and " affect.       Assessment:     1. Stent in LAD - 5/2003 with subsequent cutting balloon angioplasty 2004 repeat angio 2008 in setting of abnormal stress showed patent stent  TREADMILL STRESS   2. Coronary artery disease due to lipid rich plaque - s/p PCI to LAD with recurrent PCI for in stent restenosis   TREADMILL STRESS   3. Dyslipidemia         Medical Decision Making:  Today's Assessment / Status / Plan:     It was my pleasure to meet with Mr. Garner.    For coronary disease will get a treadmill stress test to exclude progression of disease    Is doing well with his antilipid agents    I will see Mr. Garner back in 6 months time and encouraged him to follow up with us over the phone or e-mail using my MyChart as issues arise.    It is my pleasure to participate in the care of Mr. Garner.  Please do not hesitate to contact me with questions or concerns.    Brennen Tenorio MD PhD FACC  Cardiologist [facility]

## 2018-01-31 ENCOUNTER — SUPERVISING PHYSICIAN REVIEW (OUTPATIENT)
Dept: ENDOCRINOLOGY | Facility: MEDICAL CENTER | Age: 63
End: 2018-01-31

## 2018-01-31 ENCOUNTER — OFFICE VISIT (OUTPATIENT)
Dept: ENDOCRINOLOGY | Facility: MEDICAL CENTER | Age: 63
End: 2018-01-31
Payer: COMMERCIAL

## 2018-01-31 VITALS
WEIGHT: 192.8 LBS | BODY MASS INDEX: 28.56 KG/M2 | DIASTOLIC BLOOD PRESSURE: 72 MMHG | HEART RATE: 101 BPM | HEIGHT: 69 IN | SYSTOLIC BLOOD PRESSURE: 116 MMHG | OXYGEN SATURATION: 96 %

## 2018-01-31 DIAGNOSIS — Z96.41 PRESENCE OF INSULIN PUMP: ICD-10-CM

## 2018-01-31 DIAGNOSIS — E10.9 DIABETES MELLITUS TYPE 1, CONTROLLED, INSULIN DEPENDENT (HCC): ICD-10-CM

## 2018-01-31 DIAGNOSIS — E78.2 DM TYPE 1 WITH DIABETIC MIXED HYPERLIPIDEMIA (HCC): ICD-10-CM

## 2018-01-31 DIAGNOSIS — E10.69 DM TYPE 1 WITH DIABETIC MIXED HYPERLIPIDEMIA (HCC): ICD-10-CM

## 2018-01-31 LAB
HBA1C MFR BLD: 8.4 % (ref ?–5.8)
INT CON NEG: NORMAL
INT CON POS: NORMAL

## 2018-01-31 PROCEDURE — 99215 OFFICE O/P EST HI 40 MIN: CPT | Mod: 25 | Performed by: PHYSICIAN ASSISTANT

## 2018-01-31 PROCEDURE — 95251 CONT GLUC MNTR ANALYSIS I&R: CPT | Performed by: PHYSICIAN ASSISTANT

## 2018-01-31 PROCEDURE — 83036 HEMOGLOBIN GLYCOSYLATED A1C: CPT | Performed by: PHYSICIAN ASSISTANT

## 2018-01-31 NOTE — PROGRESS NOTES
Return to office Patient Consult Note  Referred by: Calos Ingram M.D.    Reason for consult: Diabetes Management Type 1    HPI:  Amaury Garner is a 62 y.o. old patient who is seeing us today for diabetes care.  This is a pleasant patient with diabetes and I appreciate the opportunity to participate in the care of this patient.    Labs of 1/31/18 HbA1c is 8.4    He is using a Mini med 530G Medtronic insulin pump  He uses Novolog    BG Diary:1/31/2018  In the AM: see carelink report    BG Diary:4/17/2017 - see carelink report and CGM     BG Diary: See medtronic Care link report.          Has been Diabetic for since age 28 has had a pump for 20+ years.     1. DM type 1 with diabetic mixed hyperlipidemia (CMS-[facility])    He is new to the area off and on for the last 4-5 years.   He is very comfortable doing Temp basal rates for exercise if needed but he seems to not need to do this with cross country skiing he states.      2. Encounter for long-term (current) use of insulin (CMS-[facility])  Basal rate  Midnight to 5am 1.0  5am to 8am 1.3  8am to noon 1.1  Noon to 6pm 1.1  6pm to midnight 1.0     Carb ratio 1:7 (changed on 4/17/17 to)  Midnight to 5pm 1:6.8  5pm to midnight 7.0       1/31/18  Average BG is 175 +/- 71  TDD insulin: 49.7 +/- 5.7  Basal 56%  Bolus 44%     2/2017  Average BG is 179 =/- 76  TDD insulin: 46.2 +/- 5.9  Basal 51%  Bolus 49%    ROS:   Constitutional: No night sweats.  Eyes:  No visual changes.  Cardiac: No chest pain, No palpitations or racing heart rate.  Resp: No shortness of breath, No cough,   Gi: No Diarrhea      All other systems were reviewed and were/are negative.  The ROS was revised/revisited during this office visit from the patients first office visit with me on 2/24/17. Please review the full ROS during the first office visit.    Past Medical History:  Patient Active Problem List    Diagnosis Date Noted   • Non-seasonal allergic rhinitis due to pollen 08/16/2017   • History of colonic polyps  05/12/2017   • Coronary artery disease due to lipid rich plaque - s/p PCI to LAD with recurrent PCI for in stent restenosis  02/28/2017   • Dyslipidemia    • Presence of insulin pump 02/24/2017   • DM type 1 with diabetic mixed hyperlipidemia (CMS-HCC) 02/10/2017   • Hypothyroidism 02/10/2017   • Diabetes mellitus type 1, controlled, insulin dependent (CMS-HCC) 02/08/2017   • Prostatism 02/08/2017   • Stent in LAD - 5/2003 with subsequent cutting balloon angioplasty 2004 repeat angio 2008 in setting of abnormal stress showed patent stent 02/08/2017       Past Surgical History:  No past surgical history on file.    Allergies:  Patient has no known allergies.    Social History:  Social History     Social History   • Marital status: Single     Spouse name: N/A   • Number of children: N/A   • Years of education: N/A     Occupational History   • Not on file.     Social History Main Topics   • Smoking status: Never Smoker   • Smokeless tobacco: Never Used   • Alcohol use Yes   • Drug use: No   • Sexual activity: Not on file     Other Topics Concern   • Not on file     Social History Narrative   • No narrative on file       Family History:  Family History   Problem Relation Age of Onset   • Alzheimer's Disease Mother    • Cancer Father        Medications:    Current Outpatient Prescriptions:   •  NOVOLOG 100 UNIT/ML Solution, Inject 20-50 Units as instructed 3 times a day before meals., Disp: 2 Vial, Rfl: 2  •  Apoaequorin (PREVAGEN) 10 MG Cap, Take  by mouth., Disp: , Rfl:   •  fluticasone (FLONASE) 50 MCG/ACT nasal spray, Spray 1 Spray in nose every day., Disp: 16 g, Rfl: 3  •  glucose blood (ELVIRA CONTOUR NEXT TEST) strip, 1 Strip by Other route as needed (insulin dependent checking 6-8 times a day)., Disp: 150 Strip, Rfl: 6  •  glucose blood strip, 1 Strip by Other route as needed., Disp: 400 Strip, Rfl: 3  •  ezetimibe (ZETIA) 10 MG Tab, Take 1 Tab by mouth every day., Disp: 90 Tab, Rfl: 3  •  lisinopril (PRINIVIL) 10  "MG Tab, Take 1 Tab by mouth every day., Disp: 90 Tab, Rfl: 3  •  levothyroxine (SYNTHROID) 75 MCG Tab, Take 1 Tab by mouth Every morning on an empty stomach., Disp: 90 Tab, Rfl: 3  •  atorvastatin (LIPITOR) 40 MG Tab, Take 1 Tab by mouth every day., Disp: 90 Tab, Rfl: 3  •  tamsulosin (FLOMAX) 0.4 MG capsule, Take 1 Cap by mouth ONE-HALF HOUR AFTER BREAKFAST., Disp: 90 Cap, Rfl: 3  •  Glucagon, rDNA, 1 MG Kit, 1 Kit by Injection route Once PRN (use for severe Hypoglycemia only) for up to 1 dose., Disp: 2 Kit, Rfl: 3  •  Ascorbic Acid (VITAMIN C) 500 MG Cap, Take  by mouth., Disp: , Rfl:   •  aspirin EC (ECOTRIN) 81 MG Tablet Delayed Response, Take 81 mg by mouth every day., Disp: , Rfl:   •  ascorbic acid (ASCORBIC ACID) 500 MG Tab, Take 500 mg by mouth every day., Disp: , Rfl:   •  Cholecalciferol (VITAMIN D) 2000 UNITS Cap, Take  by mouth., Disp: , Rfl:   •  Ginkgo Biloba 40 MG Tab, Take 120 mg by mouth., Disp: , Rfl:   •  Multiple Vitamin (MULTI VITAMIN DAILY PO), Take  by mouth., Disp: , Rfl:   •  Probiotic Product (PROBIOTIC DAILY PO), Take  by mouth., Disp: , Rfl:         Physical Examination:   Vital signs: /72   Pulse (!) 101   Ht 1.753 m (5' 9.02\")   Wt 87.5 kg (192 lb 12.8 oz)   SpO2 96%   BMI 28.46 kg/m²   General: No distress, cooperative, well dressed and well nourished.   Eyes: No scleral icterus or discharge, No hyposphagma  ENMT: Normal on external inspection of nose, lips, No nasal drainage   Neck: No abnormal masses on inspection  Resp: Normal effort, Bilateral clear to auscultation, No wheezing, No rales  CVS: Regular rate and rhythm, S1 S2 normal, No murmur. No gallop  Extremities: No edema bilateral extremities  Neuro: Alert and oriented  Skin: No rash, No Ulcers  Psych: Normal mood and affect      Assessment and Plan:    2. Encounter for long-term (current) use of insulin (CMS-HCC)    Basal rate  Midnight to 5am 1.0  5am to 8am 1.3  (CHANGE TO 1.35)  8am to noon 1.1 (cHANGE TO " 1.2)  Noon to 6pm 1.1 (Change to 1.2)  6pm to midnight 1.0     Carb ratio 1:7 (changed on 4/17/17 to)  Midnight to 5pm 1:6.8  5pm to midnight 7.0     Basla 56%  Bolus 44%    Is on a high risk medication Insulin and we will continue to follow no hypoglycemic events since last visit     3. Fitting and adjustment of insulin pump  During today's visit we went over the patients insulin pump Medtronic and settings and the cloud based pump reports.  See report that has been scanned into the chart. This should be considered part of the chart record for today, hand written notes were placed on this record. Recommendations were made and pump settings may have been changed.  See the written notes on the scanned in report.   If this patient has a CGM then a blood glucose was taken in the office today.     4. Presence of insulin pump  The total time spent seeing this patient today face to face in consultation, and formulating an action plan for this visit was greater than 50 minutes. > Than 50% of this time was spent counseling, discussing problems documented above and below, coordinating care and answering questions by the physician assistant.  We developed a diabetes care plan for this patient today.       Return in about 3 months (around 4/30/2018).    Blood glucose log: Check BG in the morning when wake up, before lunch or dinner and before bed.  So three times a day.  Always bring BG diary to the next office visit.       Thank you kindly for allowing me to participate in the diabetes care plan for this patient.    Pino Strong PA-C, BC-ADM  Board Certified - Advanced Diabetes Management  01/31/18    CC:   Calos Ingram M.D.

## 2018-02-01 NOTE — PROGRESS NOTES
I have reviewed and agree with history, assessment and plan for office encounter on 01/31/18 with midlevel provider: Pino Strong.  Face to face encounter/direct observation: No  Suggested changes to plan or follow-up: none   Rl Tomlin M.D.

## 2018-06-04 ENCOUNTER — OFFICE VISIT (OUTPATIENT)
Dept: ENDOCRINOLOGY | Facility: MEDICAL CENTER | Age: 63
End: 2018-06-04
Payer: COMMERCIAL

## 2018-06-04 VITALS
HEIGHT: 69 IN | SYSTOLIC BLOOD PRESSURE: 108 MMHG | DIASTOLIC BLOOD PRESSURE: 68 MMHG | BODY MASS INDEX: 28.58 KG/M2 | HEART RATE: 91 BPM | WEIGHT: 193 LBS | OXYGEN SATURATION: 96 %

## 2018-06-04 DIAGNOSIS — Z79.4 ENCOUNTER FOR LONG-TERM (CURRENT) USE OF INSULIN (HCC): ICD-10-CM

## 2018-06-04 DIAGNOSIS — Z46.81 FITTING AND ADJUSTMENT OF INSULIN PUMP: ICD-10-CM

## 2018-06-04 DIAGNOSIS — E10.9 DIABETES MELLITUS TYPE 1, CONTROLLED, INSULIN DEPENDENT (HCC): ICD-10-CM

## 2018-06-04 DIAGNOSIS — E78.2 DM TYPE 1 WITH DIABETIC MIXED HYPERLIPIDEMIA (HCC): ICD-10-CM

## 2018-06-04 DIAGNOSIS — E10.69 DM TYPE 1 WITH DIABETIC MIXED HYPERLIPIDEMIA (HCC): ICD-10-CM

## 2018-06-04 DIAGNOSIS — Z96.41 PRESENCE OF INSULIN PUMP: ICD-10-CM

## 2018-06-04 LAB
HBA1C MFR BLD: 7.8 % (ref ?–5.8)
INT CON NEG: NORMAL
INT CON POS: NORMAL

## 2018-06-04 PROCEDURE — 83036 HEMOGLOBIN GLYCOSYLATED A1C: CPT | Performed by: PHYSICIAN ASSISTANT

## 2018-06-04 PROCEDURE — 95251 CONT GLUC MNTR ANALYSIS I&R: CPT | Performed by: PHYSICIAN ASSISTANT

## 2018-06-04 PROCEDURE — 99215 OFFICE O/P EST HI 40 MIN: CPT | Mod: 25 | Performed by: PHYSICIAN ASSISTANT

## 2018-06-04 RX ORDER — TRIAMCINOLONE ACETONIDE 55 UG/1
1 SPRAY, METERED NASAL
COMMUNITY
Start: 2014-04-09 | End: 2019-10-31

## 2018-06-04 RX ORDER — ATORVASTATIN CALCIUM 40 MG/1
40 TABLET, FILM COATED ORAL
COMMUNITY
Start: 2011-12-06 | End: 2018-06-04

## 2018-06-04 RX ORDER — TADALAFIL 5 MG/1
5 TABLET ORAL
COMMUNITY
Start: 2014-01-08 | End: 2018-10-05

## 2018-06-04 RX ORDER — EZETIMIBE 10 MG/1
10 TABLET ORAL
COMMUNITY
Start: 2011-12-06 | End: 2018-06-04

## 2018-06-04 RX ORDER — LEVOTHYROXINE SODIUM 0.07 MG/1
75 TABLET ORAL
COMMUNITY
Start: 2015-12-11 | End: 2018-06-04

## 2018-06-04 NOTE — PROGRESS NOTES
Return to office Patient Consult Note  Referred by: Calos Ingram M.D.    Reason for consult: Diabetes Management Type 1 w/ Pump    HPI:  Amaury Garner is a 62 y.o. old patient who is seeing us today for diabetes care.  This is a pleasant patient with diabetes and I appreciate the opportunity to participate in the care of this patient.    BG Diary:6/4/2018  In the AM:  See renato report scanned into the chart    Labs of 1/31/18 HbA1c is 8.4     He is using a Mini med 530G Medtronic insulin pump WITH THE [employer] CGM  He uses Novolog     BG Diary:1/31/2018  In the AM: see carelink report     BG Diary:4/17/2017 - see carelink report and CGM     BG Diary: See medtronic Care link report.          Has been Diabetic for since age 28 has had a pump for 20+ years.         1. Diabetes mellitus type 1, controlled, insulin dependent (HCC)  He is new to the area off and on for the last 4-5 years.   He is very comfortable doing Temp basal rates for exercise if needed but he seems to not need to do this with cross country skiing he states.     2. DM type 1 with diabetic mixed hyperlipidemia (HCC)  Basal rate  Midnight  1.0  5am        1.3  8am        1.1  Noon       1.1  6pm        1.0     Carb ratio   Midnight  6.8  5pm        7.0    Sensitivity  35    Active Insulin time 4hours      6/4/18  Average BG is 188 +/- 71  TDD insulin: 47.0 +/- 3.0  Basal 57%  Bolus 43%    1/31/18  Average BG is 175 +/- 71  TDD insulin: 49.7 +/- 5.7  Basal 56%  Bolus 44%     2/2017  Average BG is 179 =/- 76  TDD insulin: 46.2 +/- 5.9  Basal 51%  Bolus 49%    3. Encounter for long-term (current) use of insulin (HCC)  Is on a high risk medication Insulin and we will continue to follow       4. Presence of insulin pump  He is doing very well on his pump we discussed moving to medicare for some length today and how to get supplies when needed for the pump        ROS:   Constitutional: No night sweats.  Eyes:  No visual changes.  Cardiac: No chest pain, No  palpitations or racing heart rate.  Resp: No shortness of breath, No cough,   Gi: No Diarrhea    All other systems were reviewed and were/are negative.  The ROS was revised/revisited during this office visit from the patients first office visit with me on 2/24/17 Please review the full ROS during the first office visit.    Past Medical History:  Patient Active Problem List    Diagnosis Date Noted   • Non-seasonal allergic rhinitis due to pollen 08/16/2017   • History of colonic polyps 05/12/2017   • Coronary artery disease due to lipid rich plaque - s/p PCI to LAD with recurrent PCI for in stent restenosis  02/28/2017   • Dyslipidemia    • Encounter for long-term (current) use of insulin ([facility]) 02/24/2017   • Fitting and adjustment of insulin pump 02/24/2017   • Presence of insulin pump 02/24/2017   • DM type 1 with diabetic mixed hyperlipidemia ([facility]) 02/10/2017   • Hypothyroidism 02/10/2017   • Diabetes mellitus type 1, controlled, insulin dependent ([facility]) 02/08/2017   • Prostatism 02/08/2017   • Stent in LAD - 5/2003 with subsequent cutting balloon angioplasty 2004 repeat angio 2008 in setting of abnormal stress showed patent stent 02/08/2017       Past Surgical History:  No past surgical history on file.    Allergies:  Patient has no known allergies.    Social History:  Social History     Social History   • Marital status: Single     Spouse name: N/A   • Number of children: N/A   • Years of education: N/A     Occupational History   • Not on file.     Social History Main Topics   • Smoking status: Never Smoker   • Smokeless tobacco: Never Used   • Alcohol use Yes   • Drug use: No   • Sexual activity: Not on file     Other Topics Concern   • Not on file     Social History Narrative   • No narrative on file       Family History:  Family History   Problem Relation Age of Onset   • Alzheimer's Disease Mother    • Cancer Father        Medications:    Current Outpatient Prescriptions:   •  aspirin EC (ECOTRIN) 325 MG  Tablet Delayed Response, Take 325 mg by mouth., Disp: , Rfl:   •  atorvastatin (LIPITOR) 40 MG Tab, Take 40 mg by mouth., Disp: , Rfl:   •  glucose blood strip, TEST 4 TIMES DAILY as DIRECTED, Disp: , Rfl:   •  ezetimibe (ZETIA) 10 MG Tab, Take 10 mg by mouth., Disp: , Rfl:   •  levothyroxine (SYNTHROID) 75 MCG Tab, Take 75 mcg by mouth., Disp: , Rfl:   •  Multiple Vitamin (MULTI-VITAMIN) Tab, Take 1 tablet by mouth., Disp: , Rfl:   •  ezetimibe (ZETIA) 10 MG Tab, TAKE 1 TAB BY MOUTH EVERY DAY., Disp: 90 Tab, Rfl: 3  •  NOVOLOG 100 UNIT/ML Solution, Inject 40-70 Units as instructed Continuous., Disp: 6 Vial, Rfl: 3  •  Insulin Pump Accessories Misc, 1 Act by Does not apply route every day., Disp: 1 Act, Rfl: 1  •  Apoaequorin (PREVAGEN) 10 MG Cap, Take  by mouth., Disp: , Rfl:   •  glucose blood (ELVIRA CONTOUR NEXT TEST) strip, 1 Strip by Other route as needed (insulin dependent checking 6-8 times a day)., Disp: 150 Strip, Rfl: 6  •  glucose blood strip, 1 Strip by Other route as needed., Disp: 400 Strip, Rfl: 3  •  lisinopril (PRINIVIL) 10 MG Tab, Take 1 Tab by mouth every day., Disp: 90 Tab, Rfl: 3  •  levothyroxine (SYNTHROID) 75 MCG Tab, Take 1 Tab by mouth Every morning on an empty stomach., Disp: 90 Tab, Rfl: 3  •  atorvastatin (LIPITOR) 40 MG Tab, Take 1 Tab by mouth every day., Disp: 90 Tab, Rfl: 3  •  Ascorbic Acid (VITAMIN C) 500 MG Cap, Take  by mouth., Disp: , Rfl:   •  aspirin EC (ECOTRIN) 81 MG Tablet Delayed Response, Take 81 mg by mouth every day., Disp: , Rfl:   •  Ginkgo Biloba 40 MG Tab, Take 120 mg by mouth., Disp: , Rfl:   •  Multiple Vitamin (MULTI VITAMIN DAILY PO), Take  by mouth., Disp: , Rfl:   •  Probiotic Product (PROBIOTIC DAILY PO), Take  by mouth., Disp: , Rfl:   •  tadalafil (CIALIS) 5 MG tablet, Take 5 mg by mouth., Disp: , Rfl:   •  triamcinolone (NASACORT) 55 MCG/ACT nasal inhaler, Spray 1 Spray in nose., Disp: , Rfl:   •  tamsulosin (FLOMAX) 0.4 MG capsule, Take 1 Cap by mouth  "ONE-HALF HOUR AFTER DINNER., Disp: 90 Cap, Rfl: 3  •  fluticasone (FLONASE) 50 MCG/ACT nasal spray, Spray 1 Spray in nose every day., Disp: 16 g, Rfl: 3  •  Glucagon, rDNA, 1 MG Kit, 1 Kit by Injection route Once PRN (use for severe Hypoglycemia only) for up to 1 dose., Disp: 2 Kit, Rfl: 3  •  ascorbic acid (ASCORBIC ACID) 500 MG Tab, Take 500 mg by mouth every day., Disp: , Rfl:   •  Cholecalciferol (VITAMIN D) 2000 UNITS Cap, Take  by mouth., Disp: , Rfl:         Physical Examination:   Vital signs: /68   Pulse 91   Ht 1.753 m (5' 9.02\")   Wt 87.5 kg (193 lb)   SpO2 96%   BMI 28.49 kg/m²   General: No distress, cooperative, well dressed and well nourished.   Eyes: No scleral icterus or discharge, No hyposphagma  ENMT: Normal on external inspection of nose, lips, No nasal drainage   Neck: No abnormal masses on inspection  Resp: Normal effort, Bilateral clear to auscultation, No wheezing, No rales  CVS: Regular rate and rhythm, S1 S2 normal, No murmur. No gallop  Extremities: No edema bilateral extremities  Neuro: Alert and oriented  Skin: No rash, No Ulcers  Psych: Normal mood and affect      Assessment and Plan:    1. Diabetes mellitus type 1, controlled, insulin dependent (HCC)  Basal rate  Midnight  1.0  5am        1.3  8am        1.1  Noon       1.1  6pm        1.0     Carb ratio   Midnight  6.8  5pm        7.0    Sensitivity  35    Active Insulin time 4hours       NO CHANGES MADE    2. DM type 1 with diabetic mixed hyperlipidemia (HCC)  He needs to place into the pump his carbs and BG reading at lunch which he is not doing.  He is just bolusing insulin.  It is less than is needed and this is why he is high going into diner.     3. Encounter for long-term (current) use of insulin (HCC)  Is on a high risk medication Insulin and we will continue to follow       4. Presence of insulin pump  During today's visit we went over the patients insulin pump [employer] and the cloud based pump " reports.  See report that has been scanned into the chart. This should be considered part of the chart record for today, hand written notes were placed on this record. Recommendations were made and pump settings may have been changed.  See the written notes on the scanned in report.   If this patient has a CGM then a blood glucose was taken in the office today.    5. Fitting and adjustment of insulin pump  The total time spent seeing this patient today face to face in consultation, and formulating an action plan for this visit was greater than 40 minutes. > Than 50% of this time was spent counseling, discussing problems documented above and below, coordinating care and answering questions by the physician assistant.  We developed a diabetes care plan for this patient today.    Return in about 3 months (around 9/4/2018).    Blood glucose log: Check BG in the morning when wake up, before lunch or dinner and before bed.  So three times a day.  Always bring BG diary to the next office visit.     Thank you kindly for allowing me to participate in the diabetes care plan for this patient.    Pino Strong PA-C, BC-ADM  Board Certified - Advanced Diabetes Management  06/04/18    CC:   Calos Ingram M.D.

## 2018-09-17 ENCOUNTER — OFFICE VISIT (OUTPATIENT)
Dept: ENDOCRINOLOGY | Facility: MEDICAL CENTER | Age: 63
End: 2018-09-17
Payer: MEDICARE

## 2018-09-17 VITALS
BODY MASS INDEX: 27.7 KG/M2 | HEART RATE: 89 BPM | OXYGEN SATURATION: 95 % | SYSTOLIC BLOOD PRESSURE: 110 MMHG | DIASTOLIC BLOOD PRESSURE: 64 MMHG | WEIGHT: 187 LBS | HEIGHT: 69 IN

## 2018-09-17 DIAGNOSIS — E10.9 DIABETES MELLITUS TYPE 1, CONTROLLED, INSULIN DEPENDENT (HCC): ICD-10-CM

## 2018-09-17 DIAGNOSIS — Z46.81 FITTING AND ADJUSTMENT OF INSULIN PUMP: ICD-10-CM

## 2018-09-17 DIAGNOSIS — Z79.4 ENCOUNTER FOR LONG-TERM (CURRENT) USE OF INSULIN (HCC): ICD-10-CM

## 2018-09-17 DIAGNOSIS — I25.10 CORONARY ARTERY DISEASE DUE TO LIPID RICH PLAQUE: Chronic | ICD-10-CM

## 2018-09-17 DIAGNOSIS — I25.83 CORONARY ARTERY DISEASE DUE TO LIPID RICH PLAQUE: Chronic | ICD-10-CM

## 2018-09-17 LAB
HBA1C MFR BLD: 8.4 % (ref ?–5.8)
INT CON NEG: NORMAL
INT CON POS: NORMAL

## 2018-09-17 PROCEDURE — 83036 HEMOGLOBIN GLYCOSYLATED A1C: CPT | Performed by: PHYSICIAN ASSISTANT

## 2018-09-17 PROCEDURE — 99215 OFFICE O/P EST HI 40 MIN: CPT | Performed by: PHYSICIAN ASSISTANT

## 2018-09-17 NOTE — PROGRESS NOTES
Return to office Patient Consult Note  Referred by: Calos Ingram M.D.    Reason for consult: Diabetes Management Type 1 w/ Pump    HPI:  Amaury Garner is a 63 y.o. old patient who is seeing us today for diabetes care.  This is a pleasant patient with diabetes and I appreciate the opportunity to participate in the care of this patient.    Labs of 1/31/18 HbA1c is 8.4     He is using a Mini med 530G Medtronic insulin pump WITH THE [employer]LITE CGM  He uses Novolog    BG Diary:9/17/2018  In the AM:  See pump report    Has been Diabetic for since age 28 has had a pump for 20+ years.         1. Diabetes mellitus type 1, controlled, insulin dependent (HCC)  He is new to the area off and on for the last 4-5 years.   He is very comfortable doing Temp basal rates for exercise if needed but he seems to not need to do this with cross country skiing he states.     2. Coronary artery disease due to lipid rich plaque - s/p PCI to LAD with recurrent PCI for in stent restenosis   Basal rate  Midnight  1.0  5am        1.3  8am        1.1  Noon       1.1  6pm        1.0     Carb ratio   Midnight  6.8  5pm        7.0     Sensitivity  35     Active Insulin time 4hours     9/17/18  Average BG is 204 =/- 76  TDD insulin: 50 +/- 6.0  Basal 54%  Bolus 46%    6/4/18  Average BG is 188 +/- 71  TDD insulin: 47.0 +/- 3.0  Basal 57%  Bolus 43%     1/31/18  Average BG is 175 +/- 71  TDD insulin: 49.7 +/- 5.7  Basal 56%  Bolus 44%     2/2017  Average BG is 179 =/- 76  TDD insulin: 46.2 +/- 5.9  Basal 51%  Bolus 49%       3. Encounter for long-term (current) use of insulin (HCC)  Is on a high risk medication Insulin and we will continue to follow       ROS:   Constitutional: No night sweats.  Eyes:  No visual changes.  Cardiac: No chest pain, No palpitations or racing heart rate.  Resp: No shortness of breath, No cough,   Gi: No Diarrhea    All other systems were reviewed and were/are negative.  The ROS was revised/revisited during this office visit  from the patients first office visit with me on 2/24/17 Please review the full ROS during the first office visit.    Past Medical History:  Patient Active Problem List    Diagnosis Date Noted   • Non-seasonal allergic rhinitis due to pollen 08/16/2017   • History of colonic polyps 05/12/2017   • Coronary artery disease due to lipid rich plaque - s/p PCI to LAD with recurrent PCI for in stent restenosis  02/28/2017   • Dyslipidemia    • Encounter for long-term (current) use of insulin ([facility]) 02/24/2017   • Fitting and adjustment of insulin pump 02/24/2017   • Presence of insulin pump 02/24/2017   • DM type 1 with diabetic mixed hyperlipidemia ([facility]) 02/10/2017   • Hypothyroidism 02/10/2017   • Diabetes mellitus type 1, controlled, insulin dependent ([facility]) 02/08/2017   • Prostatism 02/08/2017   • Stent in LAD - 5/2003 with subsequent cutting balloon angioplasty 2004 repeat angio 2008 in setting of abnormal stress showed patent stent 02/08/2017       Past Surgical History:  No past surgical history on file.    Allergies:  Patient has no known allergies.    Social History:  Social History     Social History   • Marital status: Single     Spouse name: N/A   • Number of children: N/A   • Years of education: N/A     Occupational History   • Not on file.     Social History Main Topics   • Smoking status: Never Smoker   • Smokeless tobacco: Never Used   • Alcohol use Yes   • Drug use: No   • Sexual activity: Not on file     Other Topics Concern   • Not on file     Social History Narrative   • No narrative on file       Family History:  Family History   Problem Relation Age of Onset   • Alzheimer's Disease Mother    • Cancer Father        Medications:    Current Outpatient Prescriptions:   •  atorvastatin (LIPITOR) 40 MG Tab, Take 1 Tab by mouth every day., Disp: 90 Tab, Rfl: 3  •  ezetimibe (ZETIA) 10 MG Tab, Take 1 Tab by mouth every day., Disp: 90 Tab, Rfl: 3  •  levothyroxine (SYNTHROID) 75 MCG Tab, Take 1 Tab by mouth Every  "morning on an empty stomach., Disp: 90 Tab, Rfl: 3  •  lisinopril (PRINIVIL) 10 MG Tab, Take 1 Tab by mouth every day., Disp: 90 Tab, Rfl: 3  •  tamsulosin (FLOMAX) 0.4 MG capsule, Take 1 Cap by mouth ONE-HALF HOUR AFTER DINNER., Disp: 90 Cap, Rfl: 3  •  triamcinolone (NASACORT) 55 MCG/ACT nasal inhaler, Spray 1 Spray in nose., Disp: , Rfl:   •  NOVOLOG 100 UNIT/ML Solution, Inject 40-70 Units as instructed Continuous., Disp: 6 Vial, Rfl: 3  •  Apoaequorin (PREVAGEN) 10 MG Cap, Take  by mouth., Disp: , Rfl:   •  glucose blood (ELVIRA CONTOUR NEXT TEST) strip, 1 Strip by Other route as needed (insulin dependent checking 6-8 times a day)., Disp: 150 Strip, Rfl: 6  •  Ascorbic Acid (VITAMIN C) 500 MG Cap, Take  by mouth., Disp: , Rfl:   •  aspirin EC (ECOTRIN) 81 MG Tablet Delayed Response, Take 81 mg by mouth every day., Disp: , Rfl:   •  Ginkgo Biloba 40 MG Tab, Take 120 mg by mouth., Disp: , Rfl:   •  Multiple Vitamin (MULTI VITAMIN DAILY PO), Take  by mouth., Disp: , Rfl:   •  Probiotic Product (PROBIOTIC DAILY PO), Take  by mouth., Disp: , Rfl:   •  tadalafil (CIALIS) 5 MG tablet, Take 5 mg by mouth., Disp: , Rfl:   •  Insulin Pump Accessories Misc, 1 Act by Does not apply route every day., Disp: 1 Act, Rfl: 1  •  fluticasone (FLONASE) 50 MCG/ACT nasal spray, Spray 1 Spray in nose every day., Disp: 16 g, Rfl: 3  •  Glucagon, rDNA, 1 MG Kit, 1 Kit by Injection route Once PRN (use for severe Hypoglycemia only) for up to 1 dose., Disp: 2 Kit, Rfl: 3        Physical Examination:   Vital signs: /64   Pulse 89   Ht 1.753 m (5' 9.02\")   Wt 84.8 kg (187 lb)   SpO2 95%   BMI 27.60 kg/m²   General: No distress, cooperative, well dressed and well nourished.   Eyes: No scleral icterus or discharge, No hyposphagma  ENMT: Normal on external inspection of nose, lips, No nasal drainage   Neck: No abnormal masses on inspection  Resp: Normal effort, Bilateral clear to auscultation, No wheezing, No rales  CVS: Regular rate " and rhythm, S1 S2 normal, No murmur. No gallop  Extremities: No edema bilateral extremities  Neuro: Alert and oriented  Skin: No rash, No Ulcers  Psych: Normal mood and affect      Assessment and Plan:    1. Diabetes mellitus type 1, controlled, insulin dependent (HCC)  Basal rate  Midnight  1.0  5am        1.3  8am        1.1  Noon       1.1  6pm        1.0     Carb ratio   Midnight  6.8  5pm        7.0     Sensitivity  35     Active Insulin time 4hours       2. Coronary artery disease due to lipid rich plaque - s/p PCI to LAD with recurrent PCI for in stent restenosis   During today's visit we went over the patients insulin pump Medtronic and settings and the cloud based pump reports.  See report that has been scanned into the chart. This should be considered part of the chart record for today, hand written notes were placed on this record. Recommendations were made and pump settings may have been changed.  See the written notes on the scanned in report.   If this patient has a CGM then a blood glucose was taken in the office today.    The total time spent seeing this patient today face to face in consultation, and formulating an action plan for this visit was greater than 41 minutes. > Than 50% of this time was spent counseling, discussing problems documented above and below, coordinating care and answering questions by the physician assistant.  We developed a diabetes care plan for this patient today.      3. Encounter for long-term (current) use of insulin (HCC)  Is on a high risk medication Insulin and we will continue to follow       4. Fitting and adjustment of insulin pump  Patient was seen in conjunction with CDE/RD Homar Frazier from [employer].  His CDE services are not being billed separate.         Return in about 1 month (around 10/17/2018).    Blood glucose log: Check BG in the morning when wake up, before lunch or dinner and before bed.  So three times a day.  Always bring BG diary to the next office  visit.         Thank you kindly for allowing me to participate in the diabetes care plan for this patient.    Pino Strong PA-C, BC-[facility]  Board Certified - Advanced Diabetes Management  09/17/18    CC:   Calos Ingram M.D.

## 2018-10-09 DIAGNOSIS — E78.2 DM TYPE 1 WITH DIABETIC MIXED HYPERLIPIDEMIA (HCC): ICD-10-CM

## 2018-10-09 DIAGNOSIS — Z96.41 PRESENCE OF INSULIN PUMP: ICD-10-CM

## 2018-10-09 DIAGNOSIS — E10.69 DM TYPE 1 WITH DIABETIC MIXED HYPERLIPIDEMIA (HCC): ICD-10-CM

## 2018-10-09 DIAGNOSIS — E10.9 DIABETES MELLITUS TYPE 1, CONTROLLED, INSULIN DEPENDENT (HCC): ICD-10-CM

## 2018-10-16 ENCOUNTER — OFFICE VISIT (OUTPATIENT)
Dept: ENDOCRINOLOGY | Facility: MEDICAL CENTER | Age: 63
End: 2018-10-16
Payer: MEDICARE

## 2018-10-16 VITALS
BODY MASS INDEX: 27.93 KG/M2 | HEART RATE: 87 BPM | WEIGHT: 188.6 LBS | HEIGHT: 69 IN | DIASTOLIC BLOOD PRESSURE: 66 MMHG | OXYGEN SATURATION: 99 % | SYSTOLIC BLOOD PRESSURE: 124 MMHG

## 2018-10-16 DIAGNOSIS — E10.69 DM TYPE 1 WITH DIABETIC MIXED HYPERLIPIDEMIA (HCC): ICD-10-CM

## 2018-10-16 DIAGNOSIS — E10.9 DIABETES MELLITUS TYPE 1, CONTROLLED, INSULIN DEPENDENT (HCC): ICD-10-CM

## 2018-10-16 DIAGNOSIS — Z46.81 FITTING AND ADJUSTMENT OF INSULIN PUMP: ICD-10-CM

## 2018-10-16 DIAGNOSIS — E78.2 DM TYPE 1 WITH DIABETIC MIXED HYPERLIPIDEMIA (HCC): ICD-10-CM

## 2018-10-16 DIAGNOSIS — Z96.41 PRESENCE OF INSULIN PUMP: ICD-10-CM

## 2018-10-16 DIAGNOSIS — Z79.4 ENCOUNTER FOR LONG-TERM (CURRENT) USE OF INSULIN (HCC): ICD-10-CM

## 2018-10-16 PROCEDURE — 99215 OFFICE O/P EST HI 40 MIN: CPT | Mod: 25 | Performed by: PHYSICIAN ASSISTANT

## 2018-10-16 PROCEDURE — 95251 CONT GLUC MNTR ANALYSIS I&R: CPT | Performed by: PHYSICIAN ASSISTANT

## 2018-10-16 RX ORDER — INFLUENZA A VIRUS A/MICHIGAN/45/2015 X-275 (H1N1) ANTIGEN (FORMALDEHYDE INACTIVATED), INFLUENZA A VIRUS A/SINGAPORE/INFIMH-16-0019/2016 IVR-186 (H3N2) ANTIGEN (FORMALDEHYDE INACTIVATED), AND INFLUENZA B VIRUS B/MARYLAND/15/2016 BX-69A (A B/COLORADO/6/2017-LIKE VIRUS) ANTIGEN (FORMALDEHYDE INACTIVATED) 60; 60; 60 UG/.5ML; UG/.5ML; UG/.5ML
INJECTION, SUSPENSION INTRAMUSCULAR
Refills: 0 | COMMUNITY
Start: 2018-09-11 | End: 2018-10-16

## 2018-10-16 NOTE — PROGRESS NOTES
Return to office Patient Consult Note  Referred by: Calos Ingram M.D.    Reason for consult: Diabetes Management Type 1    HPI:  Amaury Garner is a 63 y.o. old patient who is seeing us today for diabetes care.  This is a pleasant patient with diabetes and I appreciate the opportunity to participate in the care of this patient.    Labs of 10/16/18 HbA1c is 7.1  Labs of 1/31/18 HbA1c is 8.4     He is using a Mini med 530G Medtronic insulin pump WITH THE [employer]LITE CGM  He uses Novolog    BG Diary:10/16/2018  In the AM:  See carelink report  (has been seen with Homar in the past)    Has been Diabetic for since age 28 has had a pump for 20+ years.     He is ttesting 6-8 times per day.        1. Diabetes mellitus type 1, controlled, insulin dependent (HCC)  Basal rate  Midnight  1.0  5am        1.3  8am        1.1  Noon       1.1  6pm        1.0     Carb ratio   Midnight  6.8  5pm        7.0     Sensitivity  35     Active Insulin time 4hours     2. DM type 1 with diabetic mixed hyperlipidemia (HCC)  He is new to the area off and on for the last 4-5 years.   He is very comfortable doing Temp basal rates for exercise if needed but he seems to not need to do this with cross country skiing he states.     3. Encounter for long-term (current) use of insulin (HCC)    10/16/18  Average BG is 188  TDD insulin: 47.4  Basal 57%  Bolus 43%    9/17/18  Average BG is 204 =/- 76  TDD insulin: 50 +/- 6.0  Basal 54%  Bolus 46%     6/4/18  Average BG is 188 +/- 71  TDD insulin: 47.0 +/- 3.0  Basal 57%  Bolus 43%     1/31/18  Average BG is 175 +/- 71  TDD insulin: 49.7 +/- 5.7  Basal 56%  Bolus 44%     2/2017  Average BG is 179 =/- 76  TDD insulin: 46.2 +/- 5.9  Basal 51%  Bolus 49%        ROS:   Constitutional: No night sweats.  Eyes:  No visual changes.  Cardiac: No chest pain, No palpitations or racing heart rate.  Resp: No shortness of breath, No cough,   Gi: No Diarrhea    All other systems were reviewed and were/are negative.  The ROS  was revised/revisited during this office visit from the patients first office visit with me on 2/24/17 Please review the full ROS during the first office visit.    Past Medical History:  Patient Active Problem List    Diagnosis Date Noted   • Non-seasonal allergic rhinitis due to pollen 08/16/2017   • History of colonic polyps 05/12/2017   • Coronary artery disease due to lipid rich plaque - s/p PCI to LAD with recurrent PCI for in stent restenosis  02/28/2017   • Dyslipidemia    • Encounter for long-term (current) use of insulin ([facility]) 02/24/2017   • Fitting and adjustment of insulin pump 02/24/2017   • Presence of insulin pump 02/24/2017   • DM type 1 with diabetic mixed hyperlipidemia ([facility]) 02/10/2017   • Hypothyroidism 02/10/2017   • Diabetes mellitus type 1, controlled, insulin dependent ([facility]) 02/08/2017   • Prostatism 02/08/2017   • Stent in LAD - 5/2003 with subsequent cutting balloon angioplasty 2004 repeat angio 2008 in setting of abnormal stress showed patent stent 02/08/2017       Past Surgical History:  No past surgical history on file.    Allergies:  Patient has no known allergies.    Social History:  Social History     Social History   • Marital status: Single     Spouse name: N/A   • Number of children: N/A   • Years of education: N/A     Occupational History   • Not on file.     Social History Main Topics   • Smoking status: Never Smoker   • Smokeless tobacco: Never Used   • Alcohol use Yes   • Drug use: No   • Sexual activity: Not on file     Other Topics Concern   • Not on file     Social History Narrative   • No narrative on file       Family History:  Family History   Problem Relation Age of Onset   • Alzheimer's Disease Mother    • Cancer Father        Medications:    Current Outpatient Prescriptions:   •  NOVOLOG 100 UNIT/ML Solution, Inject  Units as instructed Continuous., Disp: 6 Vial, Rfl: 3  •  tadalafil (CIALIS) 5 MG tablet, Take 1 Tab by mouth as needed for Erectile Dysfunction.,  "Disp: 10 Tab, Rfl: 3  •  atorvastatin (LIPITOR) 40 MG Tab, Take 1 Tab by mouth every day., Disp: 90 Tab, Rfl: 3  •  ezetimibe (ZETIA) 10 MG Tab, Take 1 Tab by mouth every day., Disp: 90 Tab, Rfl: 3  •  levothyroxine (SYNTHROID) 75 MCG Tab, Take 1 Tab by mouth Every morning on an empty stomach., Disp: 90 Tab, Rfl: 3  •  lisinopril (PRINIVIL) 10 MG Tab, Take 1 Tab by mouth every day., Disp: 90 Tab, Rfl: 3  •  tamsulosin (FLOMAX) 0.4 MG capsule, Take 1 Cap by mouth ONE-HALF HOUR AFTER DINNER., Disp: 90 Cap, Rfl: 3  •  triamcinolone (NASACORT) 55 MCG/ACT nasal inhaler, Spray 1 Spray in nose., Disp: , Rfl:   •  Insulin Pump Accessories Misc, 1 Act by Does not apply route every day., Disp: 1 Act, Rfl: 1  •  Apoaequorin (PREVAGEN) 10 MG Cap, Take  by mouth., Disp: , Rfl:   •  fluticasone (FLONASE) 50 MCG/ACT nasal spray, Spray 1 Spray in nose every day., Disp: 16 g, Rfl: 3  •  glucose blood (ELVIRA CONTOUR NEXT TEST) strip, 1 Strip by Other route as needed (insulin dependent checking 6-8 times a day)., Disp: 150 Strip, Rfl: 6  •  Ascorbic Acid (VITAMIN C) 500 MG Cap, Take  by mouth., Disp: , Rfl:   •  aspirin EC (ECOTRIN) 81 MG Tablet Delayed Response, Take 81 mg by mouth every day., Disp: , Rfl:   •  Ginkgo Biloba 40 MG Tab, Take 120 mg by mouth., Disp: , Rfl:   •  Multiple Vitamin (MULTI VITAMIN DAILY PO), Take  by mouth., Disp: , Rfl:   •  Probiotic Product (PROBIOTIC DAILY PO), Take  by mouth., Disp: , Rfl:   •  Glucagon, rDNA, 1 MG Kit, 1 Kit by Injection route Once PRN (use for severe Hypoglycemia only) for up to 1 dose., Disp: 2 Kit, Rfl: 3        Physical Examination:   Vital signs: /66 (BP Location: Left arm, Patient Position: Sitting, BP Cuff Size: Large adult)   Pulse 87   Ht 1.753 m (5' 9.02\")   Wt 85.5 kg (188 lb 9.6 oz)   SpO2 99%   BMI 27.84 kg/m²   General: No distress, cooperative, well dressed and well nourished.   Eyes: No scleral icterus or discharge, No hyposphagma  ENMT: Normal on external " inspection of nose, lips, No nasal drainage   Neck: No abnormal masses on inspection  Resp: Normal effort, Bilateral clear to auscultation, No wheezing, No rales  CVS: Regular rate and rhythm, S1 S2 normal, No murmur. No gallop  Extremities: No edema bilateral extremities  Neuro: Alert and oriented  Skin: No rash, No Ulcers  Psych: Normal mood and affect      Assessment and Plan:    1. Diabetes mellitus type 1, controlled, insulin dependent (HCC)  Currently he is   Basal rate  Midnight  1.0 (Change to 0.9)  5am        1.35 (Change to 1.25)  8am        1.1  Noon       1.2 (Change to 1.0)  6pm        1.0     Carb ratio   Midnight  6.8  5pm        7.0     Sensitivity  35     Active Insulin time 4hours     Having lows in the AM    2. DM type 1 with diabetic mixed hyperlipidemia (HCC)  We went over the CGM data report today in the office    3. Encounter for long-term (current) use of insulin (HCC)  Is on a high risk medication Insulin and we will continue to follow        4. Fitting and adjustment of insulin pump  During today's visit we went over the patients insulin pump Medtronic and settings and the cloud based pump reports.  See report that has been scanned into the chart. This should be considered part of the chart record for today, hand written notes were placed on this record. Recommendations were made and pump settings may have been changed.  See the written notes on the scanned in report.   If this patient has a CGM then a blood glucose was taken in the office today.        Return in about 3 months (around 1/16/2019).    Blood glucose log: He is testing 6-8 times per day    Thank you kindly for allowing me to participate in the diabetes care plan for this patient.    Pino Strong PA-C, BC-ADM  Board Certified - Advanced Diabetes Management  10/16/18    CC:   Calos Ingram M.D.

## 2018-10-29 ENCOUNTER — TELEPHONE (OUTPATIENT)
Dept: ENDOCRINOLOGY | Facility: MEDICAL CENTER | Age: 63
End: 2018-10-29

## 2018-10-29 NOTE — TELEPHONE ENCOUNTER
1. Caller Name: Amaury                                         Call Back Number: [phone] (home)         Patient approves a detailed voicemail message: no    Spoke to patient who said [facility] his order for Novolog. Spoke with Pharmacy and this was true becasue of insurance plan. Called Novolog into local [facility] and notified patient.,

## 2018-10-30 ENCOUNTER — TELEPHONE (OUTPATIENT)
Dept: ENDOCRINOLOGY | Facility: MEDICAL CENTER | Age: 63
End: 2018-10-30

## 2018-10-30 NOTE — TELEPHONE ENCOUNTER
Please add addendum to chart recommending that the patient be testing 6-8 times per day. It is under the medications the directions but needs to be in the notes per medicare.     Then I can fax to [phone]

## 2018-11-30 ENCOUNTER — TELEPHONE (OUTPATIENT)
Dept: ENDOCRINOLOGY | Facility: MEDICAL CENTER | Age: 63
End: 2018-11-30

## 2018-11-30 NOTE — TELEPHONE ENCOUNTER
Phone Number Called: [phone] (home)       Message: Spoke to patient. We tried to send the rx to [facility] in [place] to see if insurance would be willing to cover it there. Pt understood.    Left Message for patient to call back: no

## 2019-01-02 ENCOUNTER — TELEPHONE (OUTPATIENT)
Dept: ENDOCRINOLOGY | Facility: MEDICAL CENTER | Age: 64
End: 2019-01-02

## 2019-01-02 NOTE — TELEPHONE ENCOUNTER
PT asked for a refill on his Novolog. He now goes threw [facility] pharmacy. He coverage card is scanned into the media.

## 2019-01-03 DIAGNOSIS — E10.9 DIABETES MELLITUS TYPE 1, CONTROLLED, INSULIN DEPENDENT (HCC): ICD-10-CM

## 2019-01-03 DIAGNOSIS — Z96.41 PRESENCE OF INSULIN PUMP: ICD-10-CM

## 2019-01-03 DIAGNOSIS — E10.69 DM TYPE 1 WITH DIABETIC MIXED HYPERLIPIDEMIA (HCC): ICD-10-CM

## 2019-01-03 DIAGNOSIS — E78.2 DM TYPE 1 WITH DIABETIC MIXED HYPERLIPIDEMIA (HCC): ICD-10-CM

## 2019-01-03 NOTE — TELEPHONE ENCOUNTER
Patient called back stating he still has a prescription from June that was sent to [facility] on [address]. He just wanted to let us know since he changed insurance they might contact us for a PA.

## 2019-01-03 NOTE — TELEPHONE ENCOUNTER
Was the patient seen in the last year in this department? Yes    Does patient have an active prescription for medications requested? No     Received Request Via: Pharmacy     [employer]

## 2019-01-21 NOTE — TELEPHONE ENCOUNTER
----- Message from Amaury Garner sent at 11/28/2018 11:19 AM PST -----  Regarding: RE: Prescription Question  Contact: [phone]  Katie  I called [facility] and ask if the test strips are covered and I was told they are covered through the part B and not the part D prescription coverage.  My Medicare part B Supplemental is with [employer] [facility] and not [facility].  So the prescription will need a prior authorization sent to Medicare then [facility] will cover it as a medical supply.  Please call me if you have any questions.   ----- Message -----  From: Gadiel Corona Ass't  Sent: 11/27/2018  5:38 PM PST  To: Amaury Garner  Subject: RE: Prescription Question  Eusebio Rebolledo,  I spoke with your [facility] to see if they could help. She told me that we need to send this prescription to a local pharmacy and it would be covered. Would you like us to send it to [facility] or [facility] or [facility] in [place]?  It apparently has to do with your human insurance coverage.     FIOR Wilson    ----- Message -----     From: Amaury Garner     Sent: 11/27/2018  9:40 AM PST       To: Pino Strong P.A.-C.  Subject: Prescription Question    Nerissa,  I thought I sent a note asking about getting a prior authorization for my test strips.  Contour Next test strips by [employer] will have to go to my plan b, because they are durable medical supplies.  So I need a Prior Authorization sent to medicare and [facility].  [employer] informed me that Medicare will require they get filled at a local pharmacy and not through them.  The prescription can go to [facility] [address] in [place].  Please let me know if you have been contacted by [facility] for this information.  And if a prior authorization has been submitted yet.      ----- Message from [place] sent at 1/21/2019 10:03 AM CST -----  Contact: [phone]   Returning call, please call back at [phone]. Thx-AH

## 2019-03-11 ENCOUNTER — TELEPHONE (OUTPATIENT)
Dept: ENDOCRINOLOGY | Facility: MEDICAL CENTER | Age: 64
End: 2019-03-11

## 2019-03-11 DIAGNOSIS — E10.8 TYPE 1 DIABETES MELLITUS WITH COMPLICATION (HCC): ICD-10-CM

## 2019-03-11 NOTE — TELEPHONE ENCOUNTER
1. Caller Name: Amaury Garner                                             Call Back Number: [phone] (home)         Patient approves a detailed voicemail message: N\A    Patient called stating his insurance will only cover the test strips if the directions state to test 3 X day. Can you please resend his Contour next test strips to [facility].  Last visit was on 10/16/18.

## 2019-03-18 ENCOUNTER — TELEPHONE (OUTPATIENT)
Dept: ENDOCRINOLOGY | Facility: MEDICAL CENTER | Age: 64
End: 2019-03-18

## 2019-03-18 NOTE — TELEPHONE ENCOUNTER
1. Caller Name: Amaury Garner                                         Call Back Number: ,ph      Patient approves a detailed voicemail message: yes    Test strips need to be sent to [facility] in chart. original rx was sent to wrong pharmacy. Pt is completing a claim for his CGM to Medicare and need a CMN form sent to him, preferable through [employer] for him to submit with his other information for the claim.    I informed the Pt that I will look into this and get back to his as soon as I can. I will also send a note to Pino requesting that we resend the contour test strips to [facility].

## 2019-03-19 ENCOUNTER — PATIENT MESSAGE (OUTPATIENT)
Dept: ENDOCRINOLOGY | Facility: MEDICAL CENTER | Age: 64
End: 2019-03-19

## 2019-03-25 NOTE — PATIENT COMMUNICATION
1. Caller Name: Amaury Garner                                             Call Back Number: [phone] (home)         Patient approves a detailed voicemail message: no    Patient called very upset. He has been waiting for a month now for a letter of necessity for his CGM. He does not want to wait for his next appointment on 4/16/19. There were a few MyChart messages between him and Viviane that he has not had any updates. Patient would like a call with updates on this letter.    Please advise

## 2019-04-16 ENCOUNTER — OFFICE VISIT (OUTPATIENT)
Dept: ENDOCRINOLOGY | Facility: MEDICAL CENTER | Age: 64
End: 2019-04-16
Payer: MEDICARE

## 2019-04-16 VITALS
WEIGHT: 187 LBS | DIASTOLIC BLOOD PRESSURE: 70 MMHG | HEIGHT: 69 IN | HEART RATE: 98 BPM | BODY MASS INDEX: 27.7 KG/M2 | SYSTOLIC BLOOD PRESSURE: 124 MMHG | OXYGEN SATURATION: 94 %

## 2019-04-16 DIAGNOSIS — Z46.81 FITTING AND ADJUSTMENT OF INSULIN PUMP: ICD-10-CM

## 2019-04-16 DIAGNOSIS — E10.9 DIABETES MELLITUS TYPE 1, CONTROLLED, INSULIN DEPENDENT (HCC): ICD-10-CM

## 2019-04-16 DIAGNOSIS — Z79.4 ENCOUNTER FOR LONG-TERM (CURRENT) USE OF INSULIN (HCC): ICD-10-CM

## 2019-04-16 DIAGNOSIS — Z96.41 PRESENCE OF INSULIN PUMP: ICD-10-CM

## 2019-04-16 LAB
HBA1C MFR BLD: 7.2 % (ref 0–5.6)
INT CON NEG: NEGATIVE
INT CON POS: POSITIVE

## 2019-04-16 PROCEDURE — 83036 HEMOGLOBIN GLYCOSYLATED A1C: CPT | Performed by: PHYSICIAN ASSISTANT

## 2019-04-16 PROCEDURE — 99215 OFFICE O/P EST HI 40 MIN: CPT | Performed by: PHYSICIAN ASSISTANT

## 2019-04-16 NOTE — PROGRESS NOTES
Return to office Patient Consult Note  Referred by: Calos Ingram M.D.    Reason for consult: Diabetes Management Type 1 w/ Pump    HPI:  Amaury Garner is a 63 y.o. old patient who is seeing us today for diabetes care.  This is a pleasant patient with diabetes and I appreciate the opportunity to participate in the care of this patient.    He is using a Mini med 530G Medtronic insulin pump WITH THE ENLITE CGM  He uses Novolog  Labs of 4/16/2019 HbA1c is 7.2  Labs of 10/16/18 HbA1c is 7.1  Labs of 1/31/18 HbA1c is 8.4    BG Diary:4/16/2019  In the AM:  See Carelink report    1. Diabetes mellitus type 1, controlled, insulin dependent (HCC)    Basal rate      Midnight       0.925  4:30am        1.00  7:30am        1.2  Noon            1.0  6pm             1.0    Carb ratio   Midnight  6.8  5pm        7.0     Sensitivity  35     Active Insulin time 4hours     2. Fitting and adjustment of insulin pump     4/16/19  Average BG is   TDD insulin:   Basal   Bolus     10/16/18  Average BG is 188  TDD insulin: 47.4  Basal 57%  Bolus 43%     9/17/18  Average BG is 204 =/- 76  TDD insulin: 50 +/- 6.0  Basal 54%  Bolus 46%     6/4/18  Average BG is 188 +/- 71  TDD insulin: 47.0 +/- 3.0  Basal 57%  Bolus 43%     1/31/18  Average BG is 175 +/- 71  TDD insulin: 49.7 +/- 5.7  Basal 56%  Bolus 44%     2/2017  Average BG is 179 =/- 76  TDD insulin: 46.2 +/- 5.9  Basal 51%  Bolus 49%    3. Encounter for long-term (current) use of insulin (HCC)  Is on a high risk medication Insulin and we will continue to follow     4. Presence of insulin pump  He is doing well and had many questions on medicare and paying for his Sensor.      ROS:   Constitutional: No night sweats.  Eyes:  No visual changes.  Cardiac: No chest pain, No palpitations or racing heart rate.  Resp: No shortness of breath, No cough,   Gi: No Diarrhea    All other systems were reviewed and were/are negative.      Past Medical History:  Patient Active Problem List    Diagnosis  Date Noted   • Non-seasonal allergic rhinitis due to pollen 08/16/2017   • History of colonic polyps 05/12/2017   • Coronary artery disease due to lipid rich plaque - s/p PCI to LAD with recurrent PCI for in stent restenosis  02/28/2017   • Dyslipidemia    • Encounter for long-term (current) use of insulin ([facility]) 02/24/2017   • Fitting and adjustment of insulin pump 02/24/2017   • Presence of insulin pump 02/24/2017   • DM type 1 with diabetic mixed hyperlipidemia ([facility]) 02/10/2017   • Hypothyroidism 02/10/2017   • Diabetes mellitus type 1, controlled, insulin dependent ([facility]) 02/08/2017   • Prostatism 02/08/2017   • Stent in LAD - 5/2003 with subsequent cutting balloon angioplasty 2004 repeat angio 2008 in setting of abnormal stress showed patent stent 02/08/2017       Past Surgical History:  History reviewed. No pertinent surgical history.    Allergies:  Patient has no known allergies.    Social History:  Social History     Social History   • Marital status: Single     Spouse name: N/A   • Number of children: N/A   • Years of education: N/A     Occupational History   • Not on file.     Social History Main Topics   • Smoking status: Never Smoker   • Smokeless tobacco: Never Used   • Alcohol use Yes   • Drug use: No   • Sexual activity: Not on file     Other Topics Concern   • Not on file     Social History Narrative   • No narrative on file       Family History:  Family History   Problem Relation Age of Onset   • Alzheimer's Disease Mother    • Cancer Father        Medications:    Current Outpatient Prescriptions:   •  glucose blood (Waggl CONTOUR NEXT TEST) strip, 1 Strip by Other route 3 times a day., Disp: 100 Strip, Rfl: 11  •  mupirocin (BACTROBAN) 2 % Ointment, Apply bid prn, Disp: 1 Tube, Rfl: 3  •  NOVOLOG 100 UNIT/ML Solution, Inject 100 Units as instructed Continuous., Disp: 9 Vial, Rfl: 3  •  glucose blood (CONTOUR NEXT TEST) strip, Use to test glucose 4 times daily - DX: E10.9, Disp: 400 Strip, Rfl: 3  •   "tadalafil (CIALIS) 5 MG tablet, Take 1 Tab by mouth as needed for Erectile Dysfunction., Disp: 10 Tab, Rfl: 3  •  atorvastatin (LIPITOR) 40 MG Tab, Take 1 Tab by mouth every day., Disp: 90 Tab, Rfl: 3  •  ezetimibe (ZETIA) 10 MG Tab, Take 1 Tab by mouth every day., Disp: 90 Tab, Rfl: 3  •  levothyroxine (SYNTHROID) 75 MCG Tab, Take 1 Tab by mouth Every morning on an empty stomach., Disp: 90 Tab, Rfl: 3  •  lisinopril (PRINIVIL) 10 MG Tab, Take 1 Tab by mouth every day., Disp: 90 Tab, Rfl: 3  •  tamsulosin (FLOMAX) 0.4 MG capsule, Take 1 Cap by mouth ONE-HALF HOUR AFTER DINNER., Disp: 90 Cap, Rfl: 3  •  triamcinolone (NASACORT) 55 MCG/ACT nasal inhaler, Spray 1 Spray in nose., Disp: , Rfl:   •  Insulin Pump Accessories Misc, 1 Act by Does not apply route every day., Disp: 1 Act, Rfl: 1  •  Apoaequorin (PREVAGEN) 10 MG Cap, Take  by mouth., Disp: , Rfl:   •  fluticasone (FLONASE) 50 MCG/ACT nasal spray, Spray 1 Spray in nose every day., Disp: 16 g, Rfl: 3  •  Glucagon, rDNA, 1 MG Kit, 1 Kit by Injection route Once PRN (use for severe Hypoglycemia only) for up to 1 dose., Disp: 2 Kit, Rfl: 3  •  Ascorbic Acid (VITAMIN C) 500 MG Cap, Take  by mouth., Disp: , Rfl:   •  aspirin EC (ECOTRIN) 81 MG Tablet Delayed Response, Take 81 mg by mouth every day., Disp: , Rfl:   •  Ginkgo Biloba 40 MG Tab, Take 120 mg by mouth., Disp: , Rfl:   •  Multiple Vitamin (MULTI VITAMIN DAILY PO), Take  by mouth., Disp: , Rfl:   •  Probiotic Product (PROBIOTIC DAILY PO), Take  by mouth., Disp: , Rfl:         Physical Examination:  Vital signs: /70 (BP Location: Left arm, Patient Position: Sitting)   Pulse 98   Ht 1.753 m (5' 9\")   Wt 84.8 kg (187 lb)   SpO2 94%   BMI 27.62 kg/m²   General: No distress, cooperative, well dressed and well nourished.   Eyes: No scleral icterus or discharge, No hyposphagma  ENMT: Normal on external inspection of nose, lips, No nasal drainage   Neck: No abnormal masses on inspection  Resp: Normal " effort, Bilateral clear to auscultation, No wheezing, No rales  CVS: Regular rate and rhythm, S1 S2 normal, No murmur. No gallop  Extremities: No edema bilateral extremities  Neuro: Alert and oriented  Skin: No rash, No Ulcers  Psych: Normal mood and affect      Assessment and Plan:    1. Diabetes mellitus type 1, controlled, insulin dependent (HCC)    Basal rate       Midnight       0.925  4:30am        1.00  7:30am        1.2  Noon            1.0  6pm             1.0    Carb ratio   Midnight  6.8  5pm        7.0     Sensitivity  35     Active Insulin time 4hours     FOR MEDICARE  1.  This patient is checking a blood glucose level through finger sticks more than 8 times a day for the past 60 days and this has been scanned into the chart.   2.  This patient is taking 4-5 injections a day of insulin. Or is on a continuous insulin pump  3.  This patients Average  HbA1c of 8.2  I am ordering a CGM (continous glucose monitor)    2. Fitting and adjustment of insulin pump  Patient was seen in conjunction with CDE/RD or RN.  His/Her CDE services are not being billed separate.       3. Encounter for long-term (current) use of insulin (HCC)  This patient uses a continuous glucose monitor 24 hours a day to measure there blood glucose levels.  Our office printed out the CGM data report today in the office and I discussed this report at length with the patient today.  Recommendations on the data were given to the patient today to implement in the A&P section.    4. Presence of insulin pump  During today's visit we went over the patients insulin pump Medtronic and settings and the cloud based pump reports.  See report that has been scanned into the chart. This should be considered part of the chart record for today, hand written notes were placed on this record. Recommendations were made and pump settings may have been changed.  See the written notes on the scanned in report.   If this patient has a CGM then a blood glucose was  taken in the office today.    Return in about 6 weeks (around 5/28/2019).      Thank you kindly for allowing me to participate in the diabetes care plan for this patient.    Pino Strong PA-C, BC-[facility]  Board Certified - Advanced Diabetes Management  04/16/19    CC:   Calos Ingram M.D.

## 2019-04-16 NOTE — PROGRESS NOTES
"RN-CDE Note    Exercise: {EXERCISE:55689}  Diet: {DIET HABITS:64958}  Patient's body mass index is unknown because there is no height or weight on file. Exercise and nutrition counseling were performed at this visit.      Last Retinal Exam: on file and up-to-date  Daily Foot Exam: {YES (DEF)/NO:20280::\"Yes\"} ***  Routine Dental Exams: {YES (DEF)/NO:13329::\"Yes\"}      Objective:     Exam:  Monofilament: done   Monofilament testing with a 10 gram force: sensation intact: {MONOFILAMENT SENSATION/PULSE:20406::\"intact bilaterally\"}  Visual Inspection: Feet {WITH-WITHOUT DEFW/O:20407::\"without\"} maceration, ulcers, fissures.  Pedal pulses: {MONOFILAMENT SENSATION/PULSE:20406::\"intact bilaterally\"}    Plan:     Discussed and educated on:   {DIABETES RECOMMENDATIONS:68116::\"- All medications, side effects and compliance (discussed carefully)\",\"- Annual eye examinations at Ophthalmology\",\"- Home glucose monitoring emphasized\",\"- Weight control and daily exercise\"}      "

## 2019-05-02 ENCOUNTER — TELEPHONE (OUTPATIENT)
Dept: ENDOCRINOLOGY | Facility: MEDICAL CENTER | Age: 64
End: 2019-05-02

## 2019-05-02 NOTE — TELEPHONE ENCOUNTER
1. Caller Name: Amaury Garner                                             Call Back Number: [phone] (home)         Patient approves a detailed voicemail message: N\A    Patient called stating he got off the phone with [employer] this morning and they will be faxing over a form to be filled out by the office for his Dexcom G5.

## 2019-05-17 DIAGNOSIS — E10.9 DIABETES MELLITUS TYPE 1, CONTROLLED, INSULIN DEPENDENT (HCC): ICD-10-CM

## 2019-05-17 DIAGNOSIS — Z96.41 PRESENCE OF INSULIN PUMP: ICD-10-CM

## 2019-05-17 DIAGNOSIS — E78.2 DM TYPE 1 WITH DIABETIC MIXED HYPERLIPIDEMIA (HCC): ICD-10-CM

## 2019-05-17 DIAGNOSIS — E10.69 DM TYPE 1 WITH DIABETIC MIXED HYPERLIPIDEMIA (HCC): ICD-10-CM

## 2019-05-17 NOTE — TELEPHONE ENCOUNTER
Was the patient seen in the last year in this department? Yes    Does patient have an active prescription for medications requested? Yes    Received Request Via: Pharmacy     NOVOLOG 100 UNIT/ML Solution

## 2019-05-21 DIAGNOSIS — E10.69 DM TYPE 1 WITH DIABETIC MIXED HYPERLIPIDEMIA (HCC): ICD-10-CM

## 2019-05-21 DIAGNOSIS — Z96.41 PRESENCE OF INSULIN PUMP: ICD-10-CM

## 2019-05-21 DIAGNOSIS — E10.9 DIABETES MELLITUS TYPE 1, CONTROLLED, INSULIN DEPENDENT (HCC): ICD-10-CM

## 2019-05-21 DIAGNOSIS — E78.2 DM TYPE 1 WITH DIABETIC MIXED HYPERLIPIDEMIA (HCC): ICD-10-CM

## 2019-05-23 NOTE — TELEPHONE ENCOUNTER
Was the patient seen in the last year in this department? Yes 04/16/19    Does patient have an active prescription for medications requested? no    Received Request Via: Pharmacy     NOVOLOG 100 UNIT/ML Solution  Inject 40-70 Units as instructed Continuous

## 2019-06-17 ENCOUNTER — TELEPHONE (OUTPATIENT)
Dept: ENDOCRINOLOGY | Facility: MEDICAL CENTER | Age: 64
End: 2019-06-17

## 2019-06-17 NOTE — TELEPHONE ENCOUNTER
Fax from [facility] medical:     Medicare requirements for chart notes: Pt is testing 8x/day (per note) for 60 days with continuous use if insulin pump.     Please add medicare addendum to send to [facility] so Pt can get DM supplies.

## 2019-06-18 ENCOUNTER — OFFICE VISIT (OUTPATIENT)
Dept: ENDOCRINOLOGY | Facility: MEDICAL CENTER | Age: 64
End: 2019-06-18
Payer: MEDICARE

## 2019-06-18 VITALS
HEART RATE: 74 BPM | OXYGEN SATURATION: 98 % | BODY MASS INDEX: 27.7 KG/M2 | DIASTOLIC BLOOD PRESSURE: 70 MMHG | WEIGHT: 187 LBS | HEIGHT: 69 IN | SYSTOLIC BLOOD PRESSURE: 110 MMHG

## 2019-06-18 DIAGNOSIS — Z79.4 ENCOUNTER FOR LONG-TERM (CURRENT) USE OF INSULIN (HCC): ICD-10-CM

## 2019-06-18 DIAGNOSIS — Z96.41 PRESENCE OF INSULIN PUMP: ICD-10-CM

## 2019-06-18 DIAGNOSIS — E78.2 DM TYPE 1 WITH DIABETIC MIXED HYPERLIPIDEMIA (HCC): ICD-10-CM

## 2019-06-18 DIAGNOSIS — Z46.81 FITTING AND ADJUSTMENT OF INSULIN PUMP: ICD-10-CM

## 2019-06-18 DIAGNOSIS — E10.69 DM TYPE 1 WITH DIABETIC MIXED HYPERLIPIDEMIA (HCC): ICD-10-CM

## 2019-06-18 PROCEDURE — 99214 OFFICE O/P EST MOD 30 MIN: CPT | Performed by: PHYSICIAN ASSISTANT

## 2019-06-18 NOTE — PROGRESS NOTES
Return to office Patient Consult Note  Referred by: Calos Ingram M.D.    Reason for consult: Diabetes Management Type 1 w/ Pump    HPI:  Amaury Garner is a 63 y.o. old patient who is seeing us today for diabetes care.  This is a pleasant patient with diabetes and I appreciate the opportunity to participate in the care of this patient.    He is using a Mini med 530G Medtronic insulin pump WITH THE ENLITE CGM  He uses Novolog  Labs of 4/16/2019 HbA1c is 7.2  Labs of 10/16/18 HbA1c is 7.1  Labs of 1/31/18 HbA1c is 8.4    BG Diary:6/18/2019  In the AM:  See Dexcom report and Carelink report  Average BG is 7.1      1. DM type 1 with diabetic mixed hyperlipidemia (HCC)     Basal rate      00:00  0.875  04:30  0.975  07:30  1.20  12:00  0.950  18:00  1.00     Carb ratio   Midnight  6.8  5pm        7.0     Sensitivity  35     Active Insulin time 4hours     2. Fitting and adjustment of insulin pump    6/18/19  Average BG is 163  TDD insulin:  40  Basal  60%  Bolus 40%     10/16/18  Average BG is 188  TDD insulin: 47.4  Basal 57%  Bolus 43%     9/17/18  Average BG is 204 =/- 76  TDD insulin: 50 +/- 6.0  Basal 54%  Bolus 46%     6/4/18  Average BG is 188 +/- 71  TDD insulin: 47.0 +/- 3.0  Basal 57%  Bolus 43%    3. Encounter for long-term (current) use of insulin (HCC)  Is on a high risk medication Insulin and we will continue to follow       ROS:   Constitutional: No night sweats.  Eyes:  No visual changes.  Cardiac: No chest pain, No palpitations or racing heart rate.  Resp: No shortness of breath, No cough,   Gi: No Diarrhea    All other systems were reviewed and were/are negative.      Past Medical History:  Patient Active Problem List    Diagnosis Date Noted   • Non-seasonal allergic rhinitis due to pollen 08/16/2017   • History of colonic polyps 05/12/2017   • Coronary artery disease due to lipid rich plaque - s/p PCI to LAD with recurrent PCI for in stent restenosis  02/28/2017   • Dyslipidemia    • Encounter for  long-term (current) use of insulin ([facility]) 02/24/2017   • Fitting and adjustment of insulin pump 02/24/2017   • Presence of insulin pump 02/24/2017   • DM type 1 with diabetic mixed hyperlipidemia ([facility]) 02/10/2017   • Hypothyroidism 02/10/2017   • Diabetes mellitus type 1, controlled, insulin dependent ([facility]) 02/08/2017   • Prostatism 02/08/2017   • Stent in LAD - 5/2003 with subsequent cutting balloon angioplasty 2004 repeat angio 2008 in setting of abnormal stress showed patent stent 02/08/2017       Past Surgical History:  History reviewed. No pertinent surgical history.    Allergies:  Patient has no known allergies.    Social History:  Social History     Social History   • Marital status: Single     Spouse name: N/A   • Number of children: N/A   • Years of education: N/A     Occupational History   • Not on file.     Social History Main Topics   • Smoking status: Never Smoker   • Smokeless tobacco: Never Used   • Alcohol use Yes   • Drug use: No   • Sexual activity: Not on file     Other Topics Concern   • Not on file     Social History Narrative   • No narrative on file       Family History:  Family History   Problem Relation Age of Onset   • Alzheimer's Disease Mother    • Cancer Father        Medications:    Current Outpatient Prescriptions:   •  tamsulosin (FLOMAX) 0.4 MG capsule, TAKE 1 CAP BY MOUTH ONE-HALF HOUR AFTER DINNER., Disp: 100 Cap, Rfl: 3  •  ezetimibe (ZETIA) 10 MG Tab, TAKE 1 TAB BY MOUTH EVERY DAY., Disp: 100 Tab, Rfl: 3  •  NOVOLOG 100 UNIT/ML Solution, INJECT 40-70 UNITS AS INSTRUCTED CONTINUOUS., Disp: 60 mL, Rfl: 2  •  NOVOLOG 100 UNIT/ML Solution, Inject 100 Units as instructed Continuous., Disp: 9 Vial, Rfl: 3  •  glucose blood (ELVIRA CONTOUR NEXT TEST) strip, 1 Strip by Other route 3 times a day., Disp: 100 Strip, Rfl: 11  •  mupirocin (BACTROBAN) 2 % Ointment, Apply bid prn, Disp: 1 Tube, Rfl: 3  •  glucose blood (CONTOUR NEXT TEST) strip, Use to test glucose 4 times daily - DX: E10.9,  "Disp: 400 Strip, Rfl: 3  •  tadalafil (CIALIS) 5 MG tablet, Take 1 Tab by mouth as needed for Erectile Dysfunction., Disp: 10 Tab, Rfl: 3  •  atorvastatin (LIPITOR) 40 MG Tab, Take 1 Tab by mouth every day., Disp: 90 Tab, Rfl: 3  •  levothyroxine (SYNTHROID) 75 MCG Tab, Take 1 Tab by mouth Every morning on an empty stomach., Disp: 90 Tab, Rfl: 3  •  lisinopril (PRINIVIL) 10 MG Tab, Take 1 Tab by mouth every day., Disp: 90 Tab, Rfl: 3  •  triamcinolone (NASACORT) 55 MCG/ACT nasal inhaler, Spray 1 Spray in nose., Disp: , Rfl:   •  Insulin Pump Accessories Misc, 1 Act by Does not apply route every day., Disp: 1 Act, Rfl: 1  •  Apoaequorin (PREVAGEN) 10 MG Cap, Take  by mouth., Disp: , Rfl:   •  fluticasone (FLONASE) 50 MCG/ACT nasal spray, Spray 1 Spray in nose every day., Disp: 16 g, Rfl: 3  •  Glucagon, rDNA, 1 MG Kit, 1 Kit by Injection route Once PRN (use for severe Hypoglycemia only) for up to 1 dose., Disp: 2 Kit, Rfl: 3  •  Ascorbic Acid (VITAMIN C) 500 MG Cap, Take  by mouth., Disp: , Rfl:   •  aspirin EC (ECOTRIN) 81 MG Tablet Delayed Response, Take 81 mg by mouth every day., Disp: , Rfl:   •  Ginkgo Biloba 40 MG Tab, Take 120 mg by mouth., Disp: , Rfl:   •  Multiple Vitamin (MULTI VITAMIN DAILY PO), Take  by mouth., Disp: , Rfl:   •  Probiotic Product (PROBIOTIC DAILY PO), Take  by mouth., Disp: , Rfl:         Physical Examination:   Vital signs: /70 (BP Location: Left arm, Patient Position: Sitting)   Pulse 74   Ht 1.753 m (5' 9\")   Wt 84.8 kg (187 lb)   SpO2 98%   BMI 27.62 kg/m²   General: No distress, cooperative, well dressed and well nourished.   Eyes: No scleral icterus or discharge, No hyposphagma  ENMT: Normal on external inspection of nose, lips, No nasal drainage   Neck: No abnormal masses on inspection  Resp: Normal effort, Bilateral clear to auscultation, No wheezing, No rales  CVS: Regular rate and rhythm, S1 S2 normal, No murmur. No gallop  Extremities: No edema bilateral " extremities  Neuro: Alert and oriented  Skin: No rash, No Ulcers  Psych: Normal mood and affect      Assessment and Plan:    1. DM type 1 with diabetic mixed hyperlipidemia (HCC)  Basal rate      Midnight       0.925  4:30am        1.00  7:30am        1.2  Noon            1.0  6pm             1.0     Carb ratio   00:00   6.8  07:00   (change to 6.3)  11:00    7.0     Sensitivity  35     Active Insulin time 4hours     2. Fitting and adjustment of insulin pump  During today's visit we went over the patients insulin pump Medtronic and settings and the cloud based pump reports.  See report that has been scanned into the chart. This should be considered part of the chart record for today, hand written notes were placed on this record. Recommendations were made and pump settings may have been changed.  See the written notes on the scanned in report.   If this patient has a CGM then a blood glucose was taken in the office today.    3. Encounter for long-term (current) use of insulin (HCC)  Is on a high risk medication Insulin and we will continue to follow       4. Presence of insulin pump    FOR MEDICARE  1.  This patient is checking a blood glucose level through finger sticks more than 8 times a day for the past 60 days and this has been scanned into the chart.   2.  This patient is taking 4-5 injections a day of insulin. Or is on a continuous insulin pump  3.  This patients Average  HbA1c of 8.2  I am ordering a Medtronic 670G with Guardian CGM (continous glucose monitor)    Return in about 3 months (around 9/18/2019).    Thank you kindly for allowing me to participate in the diabetes care plan for this patient.    Pino Strong PA-C, BC-[facility]  Board Certified - Advanced Diabetes Management  06/18/19    CC:   Calos Ingram M.D.

## 2019-09-19 ENCOUNTER — OFFICE VISIT (OUTPATIENT)
Dept: ENDOCRINOLOGY | Facility: MEDICAL CENTER | Age: 64
End: 2019-09-19
Payer: MEDICARE

## 2019-09-19 VITALS
DIASTOLIC BLOOD PRESSURE: 60 MMHG | HEART RATE: 69 BPM | OXYGEN SATURATION: 98 % | SYSTOLIC BLOOD PRESSURE: 116 MMHG | BODY MASS INDEX: 28.29 KG/M2 | HEIGHT: 69 IN | WEIGHT: 191 LBS

## 2019-09-19 DIAGNOSIS — Z79.4 ENCOUNTER FOR LONG-TERM (CURRENT) USE OF INSULIN (HCC): ICD-10-CM

## 2019-09-19 DIAGNOSIS — E10.9 DIABETES MELLITUS TYPE 1, CONTROLLED, INSULIN DEPENDENT (HCC): ICD-10-CM

## 2019-09-19 DIAGNOSIS — E10.69 DM TYPE 1 WITH DIABETIC MIXED HYPERLIPIDEMIA (HCC): ICD-10-CM

## 2019-09-19 DIAGNOSIS — Z95.5 HISTORY OF PLACEMENT OF STENT IN ANTERIOR DESCENDING BRANCH OF LEFT CORONARY ARTERY: Chronic | ICD-10-CM

## 2019-09-19 DIAGNOSIS — Z46.81 FITTING AND ADJUSTMENT OF INSULIN PUMP: ICD-10-CM

## 2019-09-19 DIAGNOSIS — Z96.41 PRESENCE OF INSULIN PUMP: ICD-10-CM

## 2019-09-19 DIAGNOSIS — E78.2 DM TYPE 1 WITH DIABETIC MIXED HYPERLIPIDEMIA (HCC): ICD-10-CM

## 2019-09-19 LAB
HBA1C MFR BLD: 6.5 % (ref 0–5.6)
INT CON NEG: NEGATIVE
INT CON POS: POSITIVE

## 2019-09-19 PROCEDURE — 99214 OFFICE O/P EST MOD 30 MIN: CPT | Performed by: PHYSICIAN ASSISTANT

## 2019-09-19 PROCEDURE — 83036 HEMOGLOBIN GLYCOSYLATED A1C: CPT | Performed by: PHYSICIAN ASSISTANT

## 2019-09-19 PROCEDURE — 95251 CONT GLUC MNTR ANALYSIS I&R: CPT | Performed by: PHYSICIAN ASSISTANT

## 2019-09-19 NOTE — PROGRESS NOTES
Return to office Patient Consult Note  Referred by: Calos Ingram M.D.    Reason for consult: Diabetes Management Type 1 w/ Pump    HPI:  Amaury Garner is a 64 y.o. old patient who is seeing us today for diabetes care.  This is a pleasant patient with diabetes and I appreciate the opportunity to participate in the care of this patient.       He is using a Mini med 530G Medtronic insulin pump WITH THE ENLITE CGM  Labs of 9/19/2019 HbA1c is 6,5  He uses Novolog  Labs of 4/16/2019 HbA1c is 7.2  Labs of 10/16/18 HbA1c is 7.1  Labs of 1/31/18 HbA1c is 8.4    BG Diary:9/19/2019    Patient is wearing personal, therapeutic CGM for the past 60 days      1. DM type 1 with diabetic mixed hyperlipidemia (HCC)  Basal rate      00:00   0.825  04:30   1.30  07:30   1.20  12:00   0.950  18:00   0.925     Carb ratio   00:00  6.9  05:30  5.7  11:00  8.0  16:00  6.5     Sensitivity  35     Active Insulin time 4hours     3. Presence of insulin pump  9/19/19  Average BG is 150  TDD insulin:  42  Basal  57%  Bolus 43%    6/18/19  Average BG is 163  TDD insulin:  40  Basal  60%  Bolus 40%     10/16/18  Average BG is 188  TDD insulin: 47.4  Basal 57%  Bolus 43%     9/17/18  Average BG is 204 =/- 76  TDD insulin: 50 +/- 6.0  Basal 54%  Bolus 46%     6/4/18  Average BG is 188 +/- 71  TDD insulin: 47.0 +/- 3.0  Basal 57%  Bolus 43%      5. Encounter for long-term (current) use of insulin (HCC)  Is on a high risk medication Insulin and we will continue to follow       ROS:   Constitutional: No night sweats.  Eyes:  No visual changes.  Cardiac: No chest pain, No palpitations or racing heart rate.  Resp: No shortness of breath, No cough,   Gi: No Diarrhea    All other systems were reviewed and were/are negative.      Past Medical History:  Patient Active Problem List    Diagnosis Date Noted   • Non-seasonal allergic rhinitis due to pollen 08/16/2017   • History of colonic polyps 05/12/2017   • Coronary artery disease due to lipid rich plaque -  s/p PCI to LAD with recurrent PCI for in stent restenosis  02/28/2017   • Dyslipidemia    • Encounter for long-term (current) use of insulin ([facility]) 02/24/2017   • Fitting and adjustment of insulin pump 02/24/2017   • Presence of insulin pump 02/24/2017   • DM type 1 with diabetic mixed hyperlipidemia ([facility]) 02/10/2017   • Hypothyroidism 02/10/2017   • Diabetes mellitus type 1, controlled, insulin dependent ([facility]) 02/08/2017   • Prostatism 02/08/2017       Past Surgical History:  History reviewed. No pertinent surgical history.    Allergies:  Patient has no known allergies.    Social History:  Social History     Socioeconomic History   • Marital status: Single     Spouse name: Not on file   • Number of children: Not on file   • Years of education: Not on file   • Highest education level: Not on file   Occupational History   • Not on file   Social Needs   • Financial resource strain: Not on file   • Food insecurity:     Worry: Not on file     Inability: Not on file   • Transportation needs:     Medical: Not on file     Non-medical: Not on file   Tobacco Use   • Smoking status: Never Smoker   • Smokeless tobacco: Never Used   Substance and Sexual Activity   • Alcohol use: Yes   • Drug use: No   • Sexual activity: Not on file   Lifestyle   • Physical activity:     Days per week: Not on file     Minutes per session: Not on file   • Stress: Not on file   Relationships   • Social connections:     Talks on phone: Not on file     Gets together: Not on file     Attends Yarsanism service: Not on file     Active member of club or organization: Not on file     Attends meetings of clubs or organizations: Not on file     Relationship status: Not on file   • Intimate partner violence:     Fear of current or ex partner: Not on file     Emotionally abused: Not on file     Physically abused: Not on file     Forced sexual activity: Not on file   Other Topics Concern   • Not on file   Social History Narrative   • Not on file       Family  History:  Family History   Problem Relation Age of Onset   • Alzheimer's Disease Mother    • Cancer Father        Medications:    Current Outpatient Medications:   •  atorvastatin (LIPITOR) 40 MG Tab, TAKE 1 TABLET BY MOUTH EVERY DAY, Disp: 100 Tab, Rfl: 3  •  levothyroxine (SYNTHROID) 75 MCG Tab, TAKE 1 TAB BY MOUTH EVERY MORNING ON AN EMPTY STOMACH., Disp: 100 Tab, Rfl: 3  •  lisinopril (PRINIVIL) 10 MG Tab, TAKE 1 TABLET BY MOUTH EVERY DAY, Disp: 100 Tab, Rfl: 3  •  tamsulosin (FLOMAX) 0.4 MG capsule, TAKE 1 CAP BY MOUTH ONE-HALF HOUR AFTER DINNER., Disp: 100 Cap, Rfl: 3  •  ezetimibe (ZETIA) 10 MG Tab, TAKE 1 TAB BY MOUTH EVERY DAY., Disp: 100 Tab, Rfl: 3  •  NOVOLOG 100 UNIT/ML Solution, INJECT 40-70 UNITS AS INSTRUCTED CONTINUOUS., Disp: 60 mL, Rfl: 2  •  NOVOLOG 100 UNIT/ML Solution, Inject 100 Units as instructed Continuous., Disp: 9 Vial, Rfl: 3  •  glucose blood (ELVIRA CONTOUR NEXT TEST) strip, 1 Strip by Other route 3 times a day., Disp: 100 Strip, Rfl: 11  •  mupirocin (BACTROBAN) 2 % Ointment, Apply bid prn, Disp: 1 Tube, Rfl: 3  •  glucose blood (CONTOUR NEXT TEST) strip, Use to test glucose 4 times daily - DX: E10.9, Disp: 400 Strip, Rfl: 3  •  tadalafil (CIALIS) 5 MG tablet, Take 1 Tab by mouth as needed for Erectile Dysfunction., Disp: 10 Tab, Rfl: 3  •  triamcinolone (NASACORT) 55 MCG/ACT nasal inhaler, Spray 1 Spray in nose., Disp: , Rfl:   •  Insulin Pump Accessories Misc, 1 Act by Does not apply route every day., Disp: 1 Act, Rfl: 1  •  Apoaequorin (PREVAGEN) 10 MG Cap, Take  by mouth., Disp: , Rfl:   •  fluticasone (FLONASE) 50 MCG/ACT nasal spray, Spray 1 Spray in nose every day., Disp: 16 g, Rfl: 3  •  Glucagon, rDNA, 1 MG Kit, 1 Kit by Injection route Once PRN (use for severe Hypoglycemia only) for up to 1 dose., Disp: 2 Kit, Rfl: 3  •  Ascorbic Acid (VITAMIN C) 500 MG Cap, Take  by mouth., Disp: , Rfl:   •  aspirin EC (ECOTRIN) 81 MG Tablet Delayed Response, Take 81 mg by mouth every day.,  "Disp: , Rfl:   •  Ginkgo Biloba 40 MG Tab, Take 120 mg by mouth., Disp: , Rfl:   •  Multiple Vitamin (MULTI VITAMIN DAILY PO), Take  by mouth., Disp: , Rfl:   •  Probiotic Product (PROBIOTIC DAILY PO), Take  by mouth., Disp: , Rfl:         Physical Examination:   Vital signs: /60 (BP Location: Left arm, Patient Position: Sitting)   Pulse 69   Ht 1.753 m (5' 9\")   Wt 86.6 kg (191 lb)   SpO2 98%   BMI 28.21 kg/m²   General: No distress, cooperative, well dressed and well nourished.   Eyes: No scleral icterus or discharge, No hyposphagma  ENMT: Normal on external inspection of nose, lips, No nasal drainage   Neck: No abnormal masses on inspection  Resp: Normal effort, Bilateral clear to auscultation, No wheezing, No rales  CVS: Regular rate and rhythm, S1 S2 normal, No murmur. No gallop  Extremities: No edema bilateral extremities  Neuro: Alert and oriented  Skin: No rash, No Ulcers  Psych: Normal mood and affect      Assessment and Plan:    1. DM type 1 with diabetic mixed hyperlipidemia (HCC)  Basal rate      00:00   0.825  04:30   1.30  07:30   1.20  12:00   0.950  18:00   0.925     Carb ratio   00:00  6.9  05:30  5.7  11:00  8.0  16:00  6.5 (Chagne to 6.0)     Sensitivity  35     Active Insulin time 4hours       3. Presence of insulin pump  Is on a high risk medication Insulin and we will continue to follow       4. Fitting and adjustment of insulin pump  During today's visit we went over the patients insulin pump Medtronic and settings and the cloud based pump reports.  See report that has been scanned into the chart. This should be considered part of the chart record for today, hand written notes were placed on this record. Recommendations were made and pump settings may have been changed.  See the written notes on the scanned in report.   If this patient has a CGM then a blood glucose was taken in the office today.      5. Encounter for long-term (current) use of insulin (HCC)  This patient uses a " continuous glucose monitor 24 hours a day to measure there blood glucose levels.  Our office printed out the CGM data report today in the office and I discussed this report at length with the patient today.  Recommendations on the data were given to the patient today to implement in the A&P section.    Return in about 3 months (around 12/19/2019).      Thank you kindly for allowing me to participate in the diabetes care plan for this patient.    Pino Strong PA-C, BC-[facility]  Board Certified - Advanced Diabetes Management  09/19/19    CC:   Calos Ingram M.D.

## 2019-09-23 PROBLEM — M71.21 SYNOVIAL CYST OF RIGHT KNEE: Status: ACTIVE | Noted: 2019-09-23

## 2019-09-23 PROBLEM — M17.11 PRIMARY OSTEOARTHRITIS OF RIGHT KNEE: Status: ACTIVE | Noted: 2019-09-23

## 2019-11-06 ENCOUNTER — OFFICE VISIT (OUTPATIENT)
Dept: ENDOCRINOLOGY | Facility: MEDICAL CENTER | Age: 64
End: 2019-11-06
Payer: MEDICARE

## 2019-11-06 VITALS
SYSTOLIC BLOOD PRESSURE: 110 MMHG | HEART RATE: 79 BPM | DIASTOLIC BLOOD PRESSURE: 58 MMHG | BODY MASS INDEX: 27.7 KG/M2 | HEIGHT: 69 IN | OXYGEN SATURATION: 98 % | WEIGHT: 187 LBS

## 2019-11-06 DIAGNOSIS — E10.69 DM TYPE 1 WITH DIABETIC MIXED HYPERLIPIDEMIA (HCC): ICD-10-CM

## 2019-11-06 DIAGNOSIS — Z79.4 ENCOUNTER FOR LONG-TERM (CURRENT) USE OF INSULIN (HCC): ICD-10-CM

## 2019-11-06 DIAGNOSIS — Z46.81 FITTING AND ADJUSTMENT OF INSULIN PUMP: ICD-10-CM

## 2019-11-06 DIAGNOSIS — E78.2 DM TYPE 1 WITH DIABETIC MIXED HYPERLIPIDEMIA (HCC): ICD-10-CM

## 2019-11-06 PROCEDURE — 99214 OFFICE O/P EST MOD 30 MIN: CPT | Performed by: PHYSICIAN ASSISTANT

## 2019-11-06 PROCEDURE — 95251 CONT GLUC MNTR ANALYSIS I&R: CPT | Performed by: PHYSICIAN ASSISTANT

## 2019-11-06 NOTE — PROGRESS NOTES
Return to office Patient Consult Note  Referred by: Calos Ingram M.D.    Reason for consult: Diabetes Management Type 1    HPI:  Amaury Garner is a 64 y.o. old patient who is seeing us today for diabetes care.  This is a pleasant patient with diabetes and I appreciate the opportunity to participate in the care of this patient.    Labs of 11/6/2019 HbA1c is    He is using a Mini med 530G Medtronic insulin pump WITH THE [employer]LITE CGM  Labs of 9/19/2019 HbA1c is 6,5  He uses Novolog  Labs of 4/16/2019 HbA1c is 7.2  Labs of 10/16/18 HbA1c is 7.1  Labs of 1/31/18 HbA1c is 8.4    BG Diary:11/6/2019  In the AM:  See pump report and CGM report  Patient is wearing personal, therapeutic CGM for the past 60 days        1. DM type 1 with diabetic mixed hyperlipidemia (HCC)  Basal rate      00:00   0.825  04:30   1.30  07:30   1.20  12:00   0.950  18:00   0.925     Carb ratio   00:00  6.9  05:30  5.7  11:00  8.0  16:00  6.0     Sensitivity  35     Active Insulin time 4hours        2. Encounter for long-term (current) use of insulin (HCC)  9/19/19  Average BG is 150  TDD insulin:  42  Basal  57%  Bolus 43%     6/18/19  Average BG is 163  TDD insulin:  40  Basal  60%  Bolus 40%     10/16/18  Average BG is 188  TDD insulin: 47.4  Basal 57%  Bolus 43%     9/17/18  Average BG is 204 =/- 76  TDD insulin: 50 +/- 6.0  Basal 54%  Bolus 46%     6/4/18  Average BG is 188 +/- 71  TDD insulin: 47.0 +/- 3.0  Basal 57%  Bolus 43%    3. Fitting and adjustment of insulin pump  Is on a high risk medication Insulin and we will continue to follow           ROS:   Constitutional: No night sweats.  Eyes:  No visual changes.  Cardiac: No chest pain, No palpitations or racing heart rate.  Resp: No shortness of breath, No cough,   Gi: No Diarrhea      All other systems were reviewed and were/are negative.  .    Past Medical History:  Patient Active Problem List    Diagnosis Date Noted   • Primary osteoarthritis of right knee 09/23/2019   • Synovial cyst  of right knee 09/23/2019   • Non-seasonal allergic rhinitis due to pollen 08/16/2017   • History of colonic polyps 05/12/2017   • Coronary artery disease due to lipid rich plaque - s/p PCI to LAD with recurrent PCI for in stent restenosis  02/28/2017   • Dyslipidemia    • Encounter for long-term (current) use of insulin ([facility]) 02/24/2017   • Fitting and adjustment of insulin pump 02/24/2017   • Presence of insulin pump 02/24/2017   • DM type 1 with diabetic mixed hyperlipidemia ([facility]) 02/10/2017   • Hypothyroidism 02/10/2017   • Diabetes mellitus type 1, controlled, insulin dependent ([facility]) 02/08/2017   • Prostatism 02/08/2017       Past Surgical History:  History reviewed. No pertinent surgical history.    Allergies:  Patient has no known allergies.    Social History:  Social History     Socioeconomic History   • Marital status: Single     Spouse name: Not on file   • Number of children: Not on file   • Years of education: Not on file   • Highest education level: Not on file   Occupational History   • Not on file   Social Needs   • Financial resource strain: Not on file   • Food insecurity:     Worry: Not on file     Inability: Not on file   • Transportation needs:     Medical: Not on file     Non-medical: Not on file   Tobacco Use   • Smoking status: Never Smoker   • Smokeless tobacco: Never Used   Substance and Sexual Activity   • Alcohol use: Yes   • Drug use: No   • Sexual activity: Not on file   Lifestyle   • Physical activity:     Days per week: Not on file     Minutes per session: Not on file   • Stress: Not on file   Relationships   • Social connections:     Talks on phone: Not on file     Gets together: Not on file     Attends Catholic service: Not on file     Active member of club or organization: Not on file     Attends meetings of clubs or organizations: Not on file     Relationship status: Not on file   • Intimate partner violence:     Fear of current or ex partner: Not on file     Emotionally abused:  "Not on file     Physically abused: Not on file     Forced sexual activity: Not on file   Other Topics Concern   • Not on file   Social History Narrative   • Not on file       Family History:  Family History   Problem Relation Age of Onset   • Alzheimer's Disease Mother    • Cancer Father        Medications:    Current Outpatient Medications:   •  ibuprofen (MOTRIN) 200 MG Tab, Take 200 mg by mouth as needed., Disp: , Rfl:   •  atorvastatin (LIPITOR) 40 MG Tab, TAKE 1 TABLET BY MOUTH EVERY DAY, Disp: 100 Tab, Rfl: 3  •  levothyroxine (SYNTHROID) 75 MCG Tab, TAKE 1 TAB BY MOUTH EVERY MORNING ON AN EMPTY STOMACH., Disp: 100 Tab, Rfl: 3  •  lisinopril (PRINIVIL) 10 MG Tab, TAKE 1 TABLET BY MOUTH EVERY DAY, Disp: 100 Tab, Rfl: 3  •  tamsulosin (FLOMAX) 0.4 MG capsule, TAKE 1 CAP BY MOUTH ONE-HALF HOUR AFTER DINNER., Disp: 100 Cap, Rfl: 3  •  ezetimibe (ZETIA) 10 MG Tab, TAKE 1 TAB BY MOUTH EVERY DAY., Disp: 100 Tab, Rfl: 3  •  NOVOLOG 100 UNIT/ML Solution, INJECT 40-70 UNITS AS INSTRUCTED CONTINUOUS., Disp: 60 mL, Rfl: 2  •  mupirocin (BACTROBAN) 2 % Ointment, Apply bid prn, Disp: 1 Tube, Rfl: 3  •  glucose blood (CONTOUR NEXT TEST) strip, Use to test glucose 4 times daily - DX: E10.9, Disp: 400 Strip, Rfl: 3  •  Insulin Pump Accessories Misc, 1 Act by Does not apply route every day., Disp: 1 Act, Rfl: 1  •  Apoaequorin (PREVAGEN) 10 MG Cap, Take  by mouth., Disp: , Rfl:   •  Ascorbic Acid (VITAMIN C) 500 MG Cap, Take  by mouth., Disp: , Rfl:   •  aspirin EC (ECOTRIN) 81 MG Tablet Delayed Response, Take 81 mg by mouth every day., Disp: , Rfl:   •  Ginkgo Biloba 40 MG Tab, Take 120 mg by mouth., Disp: , Rfl:   •  Multiple Vitamin (MULTI VITAMIN DAILY PO), Take  by mouth., Disp: , Rfl:   •  Probiotic Product (PROBIOTIC DAILY PO), Take  by mouth., Disp: , Rfl:         Physical Examination:   Vital signs: /58 (BP Location: Left arm, Patient Position: Sitting)   Pulse 79   Ht 1.753 m (5' 9\")   Wt 84.8 kg (187 lb)   " SpO2 98%   BMI 27.62 kg/m²   General: No distress, cooperative, well dressed and well nourished.   Eyes: No scleral icterus or discharge, No hyposphagma  ENMT: Normal on external inspection of nose, lips, No nasal drainage   Neck: No abnormal masses on inspection  Resp: Normal effort, Bilateral clear to auscultation, No wheezing, No rales  CVS: Regular rate and rhythm, S1 S2 normal, No murmur. No gallop  Extremities: No edema bilateral extremities  Neuro: Alert and oriented  Skin: No rash, No Ulcers  Psych: Normal mood and affect      Assessment and Plan:    1. DM type 1 with diabetic mixed hyperlipidemia (HCC)  Basal rate      00:00   0.825  04:30   1.30  07:30   1.20  12:00   0.950  18:00   0.925     Carb ratio   00:00  6.9  05:30  5.7  11:00  8.0  16:00  6.0     Sensitivity  35     Active Insulin time 4hours     2. Encounter for long-term (current) use of insulin (HCC)  During today's visit we went over the patients insulin pump Medtronic and settings and the cloud based pump reports.  See report that has been scanned into the chart. This should be considered part of the chart record for today, hand written notes were placed on this record. Recommendations were made and pump settings may have been changed.  See the written notes on the scanned in report.   If this patient has a CGM then a blood glucose was taken in the office today.    3. Fitting and adjustment of insulin pump  This patient uses a continuous glucose monitor 24 hours a day to measure there blood glucose levels.  Our office printed out the CGM data report today in the office and I discussed this report at length with the patient today.  Recommendations on the data were given to the patient today to implement in the A&P section.    Return in about 3 months (around 2/6/2020).      k you kindly for allowing me to participate in the diabetes care plan for this patient.    Pino Strong PA-C, BC-ADM  Board Certified - Advanced Diabetes  Management  11/06/19    CC:   Calos Ingram M.D.

## 2019-11-06 NOTE — LETTER
November 6, 2019        Amaury Garner  [address] [id number]        To Whom it may concern:    Amaury is a patient of ours and has Type 1 diabetes.  He is under excellent control and is cleared for surgery.  His last two HbA1c 9/19/19 was 6.5 and today it is 6.9    If you have any questions or concerns, please don't hesitate to call.        Sincerely,        Pino Strong P.A.-C., BC-ADM

## 2019-12-05 ENCOUNTER — TELEPHONE (OUTPATIENT)
Dept: ENDOCRINOLOGY | Facility: MEDICAL CENTER | Age: 64
End: 2019-12-05

## 2019-12-05 NOTE — TELEPHONE ENCOUNTER
"Received a Sticky note on My desk stating that the Pt needed his Dexcom G6 to [facility] P# [phone],\"Requests have been sent but no response from us. Been trying for about 1 month\"      Please note that there are no requests in the Pt chart for CCS that have been received in the past month. There are also no other phone encounters for this request in the Pt chart since his LOV on 11/06/19    We received the [facility] CGM order on 012/04/19 2 6:31pm (Per the facx that I found on 12/05/19) Form has been filled out and will be signed by Pino then sent out to [facility]   "

## 2019-12-17 ENCOUNTER — OFFICE VISIT (OUTPATIENT)
Dept: ENDOCRINOLOGY | Facility: MEDICAL CENTER | Age: 64
End: 2019-12-17
Payer: MEDICARE

## 2019-12-17 VITALS
OXYGEN SATURATION: 98 % | HEART RATE: 74 BPM | SYSTOLIC BLOOD PRESSURE: 110 MMHG | DIASTOLIC BLOOD PRESSURE: 64 MMHG | BODY MASS INDEX: 27.4 KG/M2 | WEIGHT: 185 LBS | HEIGHT: 69 IN

## 2019-12-17 DIAGNOSIS — E10.69 DM TYPE 1 WITH DIABETIC MIXED HYPERLIPIDEMIA (HCC): ICD-10-CM

## 2019-12-17 DIAGNOSIS — E10.9 DIABETES MELLITUS TYPE 1, CONTROLLED, INSULIN DEPENDENT (HCC): ICD-10-CM

## 2019-12-17 DIAGNOSIS — Z46.81 FITTING AND ADJUSTMENT OF INSULIN PUMP: ICD-10-CM

## 2019-12-17 DIAGNOSIS — E78.2 DM TYPE 1 WITH DIABETIC MIXED HYPERLIPIDEMIA (HCC): ICD-10-CM

## 2019-12-17 DIAGNOSIS — Z79.4 ENCOUNTER FOR LONG-TERM (CURRENT) USE OF INSULIN (HCC): ICD-10-CM

## 2019-12-17 LAB
HBA1C MFR BLD: 6.3 % (ref 0–5.6)
INT CON NEG: NEGATIVE
INT CON POS: POSITIVE

## 2019-12-17 PROCEDURE — 83036 HEMOGLOBIN GLYCOSYLATED A1C: CPT | Performed by: PHYSICIAN ASSISTANT

## 2019-12-17 PROCEDURE — 99214 OFFICE O/P EST MOD 30 MIN: CPT | Performed by: PHYSICIAN ASSISTANT

## 2019-12-17 PROCEDURE — 95251 CONT GLUC MNTR ANALYSIS I&R: CPT | Performed by: PHYSICIAN ASSISTANT

## 2019-12-17 NOTE — PROGRESS NOTES
Return to office Patient Consult Note  Referred by: Calos Ingram M.D.    Reason for consult: Diabetes Management Type 1    HPI:  Amaury Garner is a 64 y.o. old patient who is seeing us today for diabetes care.  This is a pleasant patient with diabetes and I appreciate the opportunity to participate in the care of this patient.    He is using a Mini med 530G Medtronic insulin pump WITH Dexcom G6    Labs of 12/17/19 HbA1c is 6.3  Labs of 9/19/2019 HbA1c is 6,5  He uses Novolog  Labs of 4/16/2019 HbA1c is 7.2  Labs of 10/16/18 HbA1c is 7.1  Labs of 1/31/18 HbA1c is 8.4    BG Diary:12/17/2019  In the AM:  See Dexcom report CGM  Patient is wearing personal, therapeutic CGM for the past 60 days      1. DM type 1 with diabetic mixed hyperlipidemia (HCC)  Basal rate      00:00   0.825  04:30   1.30  07:30   1.20  12:00   0.950  18:00   0.925     Carb ratio   00:00  6.9  05:30  5.7  11:00  8.0  16:00  6.0     Sensitivity  35     Active Insulin time 4hours     2. Diabetes mellitus type 1, controlled, insulin dependent (HCC)    9/19/19  Average BG is 150  TDD insulin:  42  Basal  57%  Bolus 43%     6/18/19  Average BG is 163  TDD insulin:  40  Basal  60%  Bolus 40%     10/16/18  Average BG is 188  TDD insulin: 47.4  Basal 57%  Bolus 43%     9/17/18  Average BG is 204 =/- 76  TDD insulin: 50 +/- 6.0  Basal 54%  Bolus 46%     6/4/18  Average BG is 188 +/- 71  TDD insulin: 47.0 +/- 3.0  Basal 57%  Bolus 43%    3. Encounter for long-term (current) use of insulin (HCC)  Is on a high risk medication Insulin and we will continue to follow       ROS:   Constitutional: No night sweats.  Eyes:  No visual changes.  Cardiac: No chest pain, No palpitations or racing heart rate.  Resp: No shortness of breath, No cough,   Gi: No Diarrhea    All other systems were reviewed and were/are negative.      Past Medical History:  Patient Active Problem List    Diagnosis Date Noted   • Primary osteoarthritis of right knee 09/23/2019   • Synovial  cyst of right knee 09/23/2019   • Non-seasonal allergic rhinitis due to pollen 08/16/2017   • History of colonic polyps 05/12/2017   • Coronary artery disease due to lipid rich plaque - s/p PCI to LAD with recurrent PCI for in stent restenosis  02/28/2017   • Dyslipidemia    • Encounter for long-term (current) use of insulin ([facility]) 02/24/2017   • Fitting and adjustment of insulin pump 02/24/2017   • Presence of insulin pump 02/24/2017   • DM type 1 with diabetic mixed hyperlipidemia ([facility]) 02/10/2017   • Hypothyroidism 02/10/2017   • Diabetes mellitus type 1, controlled, insulin dependent ([facility]) 02/08/2017   • Prostatism 02/08/2017       Past Surgical History:  History reviewed. No pertinent surgical history.    Allergies:  Patient has no known allergies.    Social History:  Social History     Socioeconomic History   • Marital status: Single     Spouse name: Not on file   • Number of children: Not on file   • Years of education: Not on file   • Highest education level: Not on file   Occupational History   • Not on file   Social Needs   • Financial resource strain: Not on file   • Food insecurity:     Worry: Not on file     Inability: Not on file   • Transportation needs:     Medical: Not on file     Non-medical: Not on file   Tobacco Use   • Smoking status: Never Smoker   • Smokeless tobacco: Never Used   Substance and Sexual Activity   • Alcohol use: Yes   • Drug use: No   • Sexual activity: Not on file   Lifestyle   • Physical activity:     Days per week: Not on file     Minutes per session: Not on file   • Stress: Not on file   Relationships   • Social connections:     Talks on phone: Not on file     Gets together: Not on file     Attends L[employer] service: Not on file     Active member of club or organization: Not on file     Attends meetings of clubs or organizations: Not on file     Relationship status: Not on file   • Intimate partner violence:     Fear of current or ex partner: Not on file     Emotionally  "abused: Not on file     Physically abused: Not on file     Forced sexual activity: Not on file   Other Topics Concern   • Not on file   Social History Narrative   • Not on file       Family History:  Family History   Problem Relation Age of Onset   • Alzheimer's Disease Mother    • Cancer Father        Medications:    Current Outpatient Medications:   •  ibuprofen (MOTRIN) 200 MG Tab, Take 200 mg by mouth as needed., Disp: , Rfl:   •  atorvastatin (LIPITOR) 40 MG Tab, TAKE 1 TABLET BY MOUTH EVERY DAY, Disp: 100 Tab, Rfl: 3  •  levothyroxine (SYNTHROID) 75 MCG Tab, TAKE 1 TAB BY MOUTH EVERY MORNING ON AN EMPTY STOMACH., Disp: 100 Tab, Rfl: 3  •  lisinopril (PRINIVIL) 10 MG Tab, TAKE 1 TABLET BY MOUTH EVERY DAY, Disp: 100 Tab, Rfl: 3  •  tamsulosin (FLOMAX) 0.4 MG capsule, TAKE 1 CAP BY MOUTH ONE-HALF HOUR AFTER DINNER., Disp: 100 Cap, Rfl: 3  •  ezetimibe (ZETIA) 10 MG Tab, TAKE 1 TAB BY MOUTH EVERY DAY., Disp: 100 Tab, Rfl: 3  •  NOVOLOG 100 UNIT/ML Solution, INJECT 40-70 UNITS AS INSTRUCTED CONTINUOUS., Disp: 60 mL, Rfl: 2  •  mupirocin (BACTROBAN) 2 % Ointment, Apply bid prn, Disp: 1 Tube, Rfl: 3  •  glucose blood (CONTOUR NEXT TEST) strip, Use to test glucose 4 times daily - DX: E10.9, Disp: 400 Strip, Rfl: 3  •  Insulin Pump Accessories Misc, 1 Act by Does not apply route every day., Disp: 1 Act, Rfl: 1  •  Apoaequorin (PREVAGEN) 10 MG Cap, Take  by mouth., Disp: , Rfl:   •  Ascorbic Acid (VITAMIN C) 500 MG Cap, Take  by mouth., Disp: , Rfl:   •  aspirin EC (ECOTRIN) 81 MG Tablet Delayed Response, Take 81 mg by mouth every day., Disp: , Rfl:   •  Ginkgo Biloba 40 MG Tab, Take 120 mg by mouth., Disp: , Rfl:   •  Multiple Vitamin (MULTI VITAMIN DAILY PO), Take  by mouth., Disp: , Rfl:   •  Probiotic Product (PROBIOTIC DAILY PO), Take  by mouth., Disp: , Rfl:         Physical Examination:   Vital signs: /64 (BP Location: Left arm, Patient Position: Sitting)   Pulse 74   Ht 1.753 m (5' 9\")   Wt 83.9 kg (185 " lb)   SpO2 98%   BMI 27.32 kg/m²   General: No distress, cooperative, well dressed and well nourished.   Eyes: No scleral icterus or discharge, No hyposphagma  ENMT: Normal on external inspection of nose, lips, No nasal drainage   Neck: No abnormal masses on inspection  Resp: Normal effort, Bilateral clear to auscultation, No wheezing, No rales  CVS: Regular rate and rhythm, S1 S2 normal, No murmur. No gallop  Extremities: No edema bilateral extremities  Neuro: Alert and oriented  Skin: No rash, No Ulcers  Psych: Normal mood and affect      Assessment and Plan:    1. DM type 1 with diabetic mixed hyperlipidemia (HCC)  Basal rate      00:00   0.825  04:30   1.30  07:30   1.20  12:00   0.900  18:00   0.925     Carb ratio   00:00  6.9  05:30  5.7  11:00  8.0  16:00  6.0     Sensitivity  35     Active Insulin time 4hours     2. Diabetes mellitus type 1, controlled, insulin dependent (HCC)  No changes needed    3. Encounter for long-term (current) use of insulin (HCC)  During today's visit we went over the patients insulin pump Medtronic and settings and the cloud based pump reports.  See report that has been scanned into the chart. This should be considered part of the chart record for today, hand written notes were placed on this record. Recommendations were made and pump settings may have been changed.  See the written notes on the scanned in report.   If this patient has a CGM then a blood glucose was taken in the office today.      4. Fitting and adjustment of insulin pump  This patient uses a continuous glucose monitor 24 hours a day to measure there blood glucose levels.  Our office printed out the CGM data report today in the office and I discussed this report at length with the patient today.  Recommendations on the data were given to the patient today to implement in the A&P section.    Return in about 3 months (around 3/17/2020).      Thank you kindly for allowing me to participate in the diabetes care plan  for this patient.    Pino Strong PA-C, [facility]  Board Certified - Advanced Diabetes Management  12/17/19    CC:   Calos Ingram M.D.

## 2020-03-13 ENCOUNTER — OFFICE VISIT (OUTPATIENT)
Dept: ENDOCRINOLOGY | Facility: MEDICAL CENTER | Age: 65
End: 2020-03-13
Payer: MEDICARE

## 2020-03-13 VITALS
OXYGEN SATURATION: 98 % | DIASTOLIC BLOOD PRESSURE: 60 MMHG | HEART RATE: 89 BPM | HEIGHT: 69 IN | WEIGHT: 179 LBS | BODY MASS INDEX: 26.51 KG/M2 | SYSTOLIC BLOOD PRESSURE: 118 MMHG

## 2020-03-13 DIAGNOSIS — Z96.41 PRESENCE OF INSULIN PUMP: ICD-10-CM

## 2020-03-13 DIAGNOSIS — E06.3 HASHIMOTO'S THYROIDITIS: ICD-10-CM

## 2020-03-13 DIAGNOSIS — E03.9 ACQUIRED HYPOTHYROIDISM: ICD-10-CM

## 2020-03-13 DIAGNOSIS — Z79.4 LONG-TERM INSULIN USE (HCC): ICD-10-CM

## 2020-03-13 DIAGNOSIS — E10.69 HYPERLIPIDEMIA DUE TO TYPE 1 DIABETES MELLITUS (HCC): ICD-10-CM

## 2020-03-13 DIAGNOSIS — E10.9 DIABETES MELLITUS TYPE 1, CONTROLLED, INSULIN DEPENDENT (HCC): ICD-10-CM

## 2020-03-13 DIAGNOSIS — E78.5 HYPERLIPIDEMIA DUE TO TYPE 1 DIABETES MELLITUS (HCC): ICD-10-CM

## 2020-03-13 LAB
HBA1C MFR BLD: 7.2 % (ref 0–5.6)
INT CON NEG: ABNORMAL
INT CON POS: ABNORMAL

## 2020-03-13 PROCEDURE — 95251 CONT GLUC MNTR ANALYSIS I&R: CPT | Performed by: INTERNAL MEDICINE

## 2020-03-13 PROCEDURE — 99214 OFFICE O/P EST MOD 30 MIN: CPT | Performed by: INTERNAL MEDICINE

## 2020-03-13 PROCEDURE — 83036 HEMOGLOBIN GLYCOSYLATED A1C: CPT | Performed by: INTERNAL MEDICINE

## 2020-03-13 ASSESSMENT — FIBROSIS 4 INDEX: FIB4 SCORE: 1.11

## 2020-03-13 NOTE — PROGRESS NOTES
CHIEF COMPLAINT: Patient is here for follow up of Type 1 Diabetes Mellitus    HPI:     Amaury Garner is a 64 y.o. male with Type 1 Diabetes Mellitus here for follow up.    Labs from March 13, 2019 showed a fair A1c of 7.2%.    He was previously followed by LORENZO Salinas and this is my first time seeing him.  He has a history of primary hypothyroidism, and coronary artery disease aside from type 1 diabetes.  He is on insulin pump therapy with a Medtronic 670 G pump.  His insurance does not cover the Guardian sensor thus he is using a Dexcom G6 CGM.      Current pump settings are as follows:    Midnight 0.825 units/h  5 AM 1.0 units/h  8 AM 1.0 units/h  12 noon 0.95 units/h  6 PM 0.975 units/h    Insulin to carbohydrate ratio midnight 6.9  5:30 AM 5.7 g  11 AM 9.0  4 PM 6.0    Sensitivity 35,    Target blood sugar   midnight 100,  5 PM 90,  8       Download of his CGM today shows a fair average blood sugar of 147 with time in range of 76% which is very good.    He has problems with hyperglycemia because of suspending his insulin pump during his workout and after showering after his workout.  He forgets to restart and on suspend his insulin pump thus he develops hyperglycemia from lack of insulin for a prolonged period of time.    He has hypothyroidism and is taking levothyroxine 75 MCG daily.  His last TSH was fair at 2.1 on April 2019.  He is overdue for assessment of his TSH level.    He has hyperlipidemia and is taking Lipitor 40 mg daily.  He is tolerating this well.  His last LDL cholesterol was 66, triglycerides 35, HDL 65 and total cholesterol 138 on October 2019.      BG Diary:03/13/20  Please see Dexcom G6 CGM download    Weight has been stable    Diabetes Complications   Retinopathy: Known retinopathy.  Last eye exam: February 2020 with [facility]  Neuropathy: Denies paresthesias or numbness in hands or feet. Denies any foot wounds.  Exercise: Minimal.  Diet: Fair.  Patient's medications,  "allergies, and social histories were reviewed and updated as appropriate.    ROS:     CONS:     No fever, no chills   EYES:     No diplopia, no blurry vision   CV:           No chest pain, no palpitations   PULM:     No SOB, no cough, no hemoptysis.   GI:            No nausea, no vomiting, no diarrhea, no constipation   ENDO:     No polyuria, no polydipsia, no heat intolerance, no cold intolerance       Past Medical History:  Problem List:  2019-09: Primary osteoarthritis of right knee  2019-09: Synovial cyst of right knee  2017-08: Non-seasonal allergic rhinitis due to pollen  2017-05: History of colonic polyps  2017-02: Coronary artery disease due to lipid rich plaque - s/p PCI   to LAD with recurrent PCI for in stent restenosis   2017-02: Encounter for long-term (current) use of insulin ([facility])  2017-02: Fitting and adjustment of insulin pump  2017-02: Presence of insulin pump  2017-02: DM type 1 with diabetic mixed hyperlipidemia ([facility])  2017-02: Hypothyroidism  2017-02: Diabetes mellitus type 1, controlled, insulin dependent ([facility])  2017-02: Prostatism  2017-02: Insulin pump in place  2017-02: Stent in LAD - 5/2003 with subsequent cutting balloon   angioplasty 2004 repeat angio 2008 in setting of abnormal stress   showed patent stent  Dyslipidemia      Past Surgical History:  No past surgical history on file.     Allergies:  Patient has no known allergies.     Social History:  Social History     Tobacco Use   • Smoking status: Never Smoker   • Smokeless tobacco: Never Used   Substance Use Topics   • Alcohol use: Yes   • Drug use: No        Family History:   family history includes Alzheimer's Disease in his mother; Cancer in his father.      PHYSICAL EXAM:   OBJECTIVE:  Vital signs: /60   Pulse 89   Ht 1.753 m (5' 9\")   Wt 81.2 kg (179 lb)   SpO2 98%   BMI 26.43 kg/m²   GENERAL: Well-developed, well-nourished in no apparent distress.   EYE:  No ocular asymmetry, PERRLA  HENT: Pink, moist mucous " membranes.    NECK: No thyromegaly.   CARDIOVASCULAR:  No murmurs  LUNGS: Clear breath sounds  ABDOMEN: Soft, nontender   EXTREMITIES: No clubbing, cyanosis, or edema.   NEUROLOGICAL: No gross focal motor abnormalities   LYMPH: No cervical adenopathy palpated.   SKIN: No rashes, lesions.     Labs:  Lab Results   Component Value Date/Time    HBA1C 7.2 (A) 03/13/2020 08:58 AM        Lab Results   Component Value Date/Time    WBC 5.1 10/16/2019 08:25 AM    RBC 4.67 (L) 10/16/2019 08:25 AM    HEMOGLOBIN 14.8 10/16/2019 08:25 AM    MCV 93.1 10/16/2019 08:25 AM    MCH 31.7 (H) 10/16/2019 08:25 AM    MCHC 34.0 10/16/2019 08:25 AM    RDW 12.7 10/16/2019 08:25 AM    MPV 10.1 10/16/2019 08:25 AM       Lab Results   Component Value Date/Time    SODIUM 137 10/16/2019 08:25 AM    POTASSIUM 4.2 10/16/2019 08:25 AM    CHLORIDE 102 10/16/2019 08:25 AM    CO2 27 10/16/2019 08:25 AM    ANION 12 10/16/2019 08:25 AM    GLUCOSE 222 (H) 10/16/2019 08:25 AM    BUN 19 (H) 10/16/2019 08:25 AM    CREATININE 1.1 10/16/2019 08:25 AM    CALCIUM 8.5 10/16/2019 08:25 AM    ASTSGOT 23 10/16/2019 08:25 AM    ALTSGPT 37 10/16/2019 08:25 AM    TBILIRUBIN 0.5 10/16/2019 08:25 AM    ALBUMIN 3.3 (L) 10/16/2019 08:25 AM    TOTPROTEIN 6.8 10/16/2019 08:25 AM    AGRATIO 0.9 10/16/2019 08:25 AM       Lab Results   Component Value Date/Time    CHOLSTRLTOT 138 10/16/2019 0825    TRIGLYCERIDE 35 10/16/2019 0825    HDL 65.0 (H) 10/16/2019 0825    LDL 66 10/16/2019 0825    CHOLHDLRAT 2.12 10/16/2019 0825    NONHDL 73 10/16/2019 0825       Lab Results   Component Value Date/Time    MALBCRT 12 04/17/2019 07:57 AM    MICROALBUR 12.1 04/17/2019 07:57 AM        Lab Results   Component Value Date/Time    TSHULTRASEN 2.10 04/17/2019 0757     No results found for: FREEDIR  No results found for: FREET3  No results found for: THYSTIMIG        ASSESSMENT/PLAN:     1. Diabetes mellitus type 1, controlled, insulin dependent (HCC)  Uncontrolled  A1c elevation related to  insulin pump suspension and failure to restart insulin infusion on time  I did not make any changes in his basal rates  However I did adjust his pump alerts so that he does not forget to restart his insulin pump after suspending it  I placed an alert at 10 AM and 11 AM to remind him to on suspend his insulin pump  I recommend that he watch his carb intake  I recommend regular exercise  We will plan for follow-up in 3 months with a repeat of his A1c    2. Acquired hypothyroidism  Unstable  He is overdue for labs  I am checking a TSH today  Continue levothyroxine 75 MCG daily for now  I will update him on his labs and make changes to his thyroid hormone replacement therapy if indicated      3. Hashimoto's thyroiditis  This is the etiology of his hypothyroidism    4. Presence of insulin pump  Insulin pump is in place    5. Hyperlipidemia due to type 1 diabetes mellitus (HCC)  Well-controlled  Continue atorvastatin  I am checking a fasting lipid panel today    6. Long-term insulin use (HCC)  Patient is on long-term insulin therapy for type 1 diabetes      Return in about 3 months (around 6/13/2020).      Thank you kindly for allowing me to participate in the diabetes care plan for this patient.    Favio Bowens MD, FACE, [facility]  03/13/20    CC:   Calos Ingram M.D.

## 2020-04-06 ENCOUNTER — TELEPHONE (OUTPATIENT)
Dept: ENDOCRINOLOGY | Facility: MEDICAL CENTER | Age: 65
End: 2020-04-06

## 2020-04-06 NOTE — TELEPHONE ENCOUNTER
Phone Number Called: [phone] (home)       Call outcome: Spoke to patient regarding message below.    Message: I spoke with Amaury about his labs, wanted to see if he had the oppurtunity to review his results he stated he did and he did not have any additional questions.

## 2020-04-06 NOTE — TELEPHONE ENCOUNTER
----- Message from Favio Bowens M.D. sent at 4/6/2020  9:29 AM PDT -----  To Mr. Garner    Good day to you I hope you are well I got your labs and they all show good numbers - CBC, liver, kidney, bad  Cholesterol, thyroid, protein in urine are all normal or in good levels    Please continue as planned and stay in good health    I will see you on your follow up - hopefully it will be in the office and we can do an a1c then    Thank you    Dr. Bowens

## 2020-06-15 ENCOUNTER — OFFICE VISIT (OUTPATIENT)
Dept: ENDOCRINOLOGY | Facility: MEDICAL CENTER | Age: 65
End: 2020-06-15
Payer: MEDICARE

## 2020-06-15 VITALS
OXYGEN SATURATION: 95 % | HEIGHT: 69 IN | BODY MASS INDEX: 26.96 KG/M2 | WEIGHT: 182 LBS | HEART RATE: 80 BPM | SYSTOLIC BLOOD PRESSURE: 116 MMHG | DIASTOLIC BLOOD PRESSURE: 62 MMHG

## 2020-06-15 DIAGNOSIS — Z96.41 PRESENCE OF INSULIN PUMP: ICD-10-CM

## 2020-06-15 DIAGNOSIS — E03.9 ACQUIRED HYPOTHYROIDISM: ICD-10-CM

## 2020-06-15 DIAGNOSIS — E06.3 HASHIMOTO'S DISEASE: ICD-10-CM

## 2020-06-15 DIAGNOSIS — E10.9 DIABETES MELLITUS TYPE 1, CONTROLLED, INSULIN DEPENDENT (HCC): ICD-10-CM

## 2020-06-15 DIAGNOSIS — Z79.4 ENCOUNTER FOR LONG-TERM (CURRENT) USE OF INSULIN (HCC): ICD-10-CM

## 2020-06-15 DIAGNOSIS — E78.5 DYSLIPIDEMIA: Chronic | ICD-10-CM

## 2020-06-15 LAB
HBA1C MFR BLD: 7.5 % (ref 0–5.6)
INT CON NEG: NEGATIVE
INT CON POS: POSITIVE

## 2020-06-15 PROCEDURE — 83036 HEMOGLOBIN GLYCOSYLATED A1C: CPT | Performed by: INTERNAL MEDICINE

## 2020-06-15 PROCEDURE — 99214 OFFICE O/P EST MOD 30 MIN: CPT | Mod: 25 | Performed by: INTERNAL MEDICINE

## 2020-06-15 PROCEDURE — 95251 CONT GLUC MNTR ANALYSIS I&R: CPT | Performed by: INTERNAL MEDICINE

## 2020-06-15 ASSESSMENT — FIBROSIS 4 INDEX: FIB4 SCORE: 1.45

## 2020-06-15 NOTE — PROGRESS NOTES
CHIEF COMPLAINT: Patient is here for follow up of Type 1 Diabetes Mellitus    HPI:     Amaury Garner is a 64 y.o. male with Type 1 Diabetes Mellitus here for follow up.    Labs from 6/15/2020 showed an a1c of 7.5%  Labs from March 13, 2019 showed a fair A1c of 7.2%.    He was previously followed by LORENZO Salinas and this is my second time seeing him.  He has a history of primary hypothyroidism, and coronary artery disease aside from type 1 diabetes.  He is on insulin pump therapy with a Medtronic 670 G pump.  His insurance does not cover the Guardian sensor thus he is using a Dexcom G6 CGM.      Current pump settings are as follows:    Midnight 0.825 units/h  5 AM 1.0 units/h  8 AM 1.0 units/h  12 noon 0.95 units/h  6 PM 0.975 units/h    Insulin to carbohydrate ratio midnight 6.9  5:30 AM 6.9 g  5:30 AM 5.7  11 AM 9.0  4 PM 6.0    Sensitivity 35,    Target blood sugar   midnight 100-130,  5 PM ,  8 -130      Download of his CGM today shows a fair average blood sugar of 162 with time in range of 62% which is fair  His sugars mehul because he was on steroids for a rash on his chest and back which started after knee surgery.  He just finished the steroids.  He also had some infusion site issues and had found a few times his kinked sets or inserts       He has hypothyroidism and is taking levothyroxine 75 MCG daily.  His last TSH was fair at 2.1 on April 2020.  He is overdue for assessment of his TSH level.    He has hyperlipidemia and is taking Lipitor 40 mg daily.  He is tolerating this well.  His last LDL cholesterol was 66, triglycerides 35, HDL 65 and total cholesterol 138 on June 2020      BG Diary:  Please see Dexcom G6 CGM download    Weight has been stable    Diabetes Complications   Retinopathy: Known retinopathy.  Last eye exam: February 2020 with [facility]  Neuropathy: Denies paresthesias or numbness in hands or feet. Denies any foot wounds.  Exercise: Minimal.  Diet:  Fair.  Patient's medications, allergies, and social histories were reviewed and updated as appropriate.    ROS:     CONS:     No fever, no chills   EYES:     No diplopia, no blurry vision   CV:           No chest pain, no palpitations   PULM:     No SOB, no cough, no hemoptysis.   GI:            No nausea, no vomiting, no diarrhea, no constipation   ENDO:     No polyuria, no polydipsia, no heat intolerance, no cold intolerance       Past Medical History:  Problem List:  2019-09: Primary osteoarthritis of right knee  2019-09: Synovial cyst of right knee  2017-08: Non-seasonal allergic rhinitis due to pollen  2017-05: History of colonic polyps  2017-02: Coronary artery disease due to lipid rich plaque - s/p PCI   to LAD with recurrent PCI for in stent restenosis   2017-02: Encounter for long-term (current) use of insulin ([facility])  2017-02: Fitting and adjustment of insulin pump  2017-02: Presence of insulin pump  2017-02: DM type 1 with diabetic mixed hyperlipidemia ([facility])  2017-02: Hypothyroidism  2017-02: Diabetes mellitus type 1, controlled, insulin dependent ([facility])  2017-02: Prostatism  2017-02: Insulin pump in place  2017-02: Stent in LAD - 5/2003 with subsequent cutting balloon   angioplasty 2004 repeat angio 2008 in setting of abnormal stress   showed patent stent  Dyslipidemia      Past Surgical History:  Past Surgical History:   Procedure Laterality Date   • PB COLONOSCOPY,DIAGNOSTIC N/A 3/17/2020    Procedure: COLONOSCOPY;  Surgeon: Julius Batista M.D.;  Location: SURGERY [facility];  Service: Gen Robotic        Allergies:  Patient has no known allergies.     Social History:  Social History     Tobacco Use   • Smoking status: Never Smoker   • Smokeless tobacco: Never Used   Substance Use Topics   • Alcohol use: Yes     Drinks per session: 1 or 2   • Drug use: No        Family History:   family history includes Alzheimer's Disease in his mother; Cancer in his father.      PHYSICAL EXAM:   OBJECTIVE:  Vital  "signs: /62 (BP Location: Left arm, Patient Position: Sitting, BP Cuff Size: Adult)   Pulse 80   Ht 1.753 m (5' 9\")   Wt 82.6 kg (182 lb)   SpO2 95%   BMI 26.88 kg/m²   GENERAL: Well-developed, well-nourished in no apparent distress.   EYE:  No ocular asymmetry, PERRLA  HENT: Pink, moist mucous membranes.    NECK: No thyromegaly.   CARDIOVASCULAR:  No murmurs  LUNGS: Clear breath sounds  ABDOMEN: Soft, nontender   EXTREMITIES: No clubbing, cyanosis, or edema.   NEUROLOGICAL: No gross focal motor abnormalities   LYMPH: No cervical adenopathy palpated.   SKIN: No rashes, lesions.     Labs:  Lab Results   Component Value Date/Time    HBA1C 7.5 (A) 06/15/2020 02:56 PM        Lab Results   Component Value Date/Time    WBC 5.0 04/04/2020 08:20 AM    RBC 5.12 04/04/2020 08:20 AM    HEMOGLOBIN 15.4 04/04/2020 08:20 AM    MCV 89.5 04/04/2020 08:20 AM    MCH 30.1 04/04/2020 08:20 AM    MCHC 33.6 04/04/2020 08:20 AM    RDW 15.9 (H) 04/04/2020 08:20 AM    MPV 9.8 04/04/2020 08:20 AM       Lab Results   Component Value Date/Time    SODIUM 143 04/04/2020 08:20 AM    POTASSIUM 4.3 04/04/2020 08:20 AM    CHLORIDE 105 04/04/2020 08:20 AM    CO2 27 04/04/2020 08:20 AM    ANION 15 04/04/2020 08:20 AM    GLUCOSE 86 04/04/2020 08:20 AM    BUN 22 (H) 04/04/2020 08:20 AM    CREATININE 1.0 04/04/2020 08:20 AM    CALCIUM 8.3 (L) 04/04/2020 08:20 AM    ASTSGOT 30 04/04/2020 08:20 AM    ALTSGPT 39 04/04/2020 08:20 AM    TBILIRUBIN 0.5 04/04/2020 08:20 AM    ALBUMIN 3.8 04/04/2020 08:20 AM    TOTPROTEIN 7.0 04/04/2020 08:20 AM    AGRATIO 1.2 04/04/2020 08:20 AM       Lab Results   Component Value Date/Time    CHOLSTRLTOT 138 10/16/2019 0825    TRIGLYCERIDE 35 10/16/2019 0825    HDL 65.0 (H) 10/16/2019 0825    LDL 66 10/16/2019 0825    CHOLHDLRAT 2.12 10/16/2019 0825    NONHDL 73 10/16/2019 0825       Lab Results   Component Value Date/Time    MALBCRT 3 04/04/2020 08:20 AM    MICROALBUR 2.6 04/04/2020 08:20 AM        Lab Results "   Component Value Date/Time    TSHULTRASEN 2.10 04/17/2019 0757     No results found for: FREEDIR  No results found for: FREET3  No results found for: THYSTIMIG        ASSESSMENT/PLAN:     1. Diabetes mellitus type 1, controlled, insulin dependent (HCC)  Uncontrolled  A1c elevation related to steroids and bad sites  I did not make any changes in his basal rates  However if his next A1c is elevated I am going to make some changes with his basal rates  I recommend that he watch his carb intake  I recommend regular exercise  We will plan for follow-up in 3 months with a repeat of his A1c    2. Acquired hypothyroidism  Well-controlled  Continue levothyroxine 75 MCG daily for now  We will plan to repeat his TSH after 6 months    3. Hashimoto's thyroiditis  This is the etiology of his hypothyroidism    4. Presence of insulin pump  Insulin pump is in place    5. Hyperlipidemia due to type 1 diabetes mellitus (HCC)  Well-controlled  Continue atorvastatin  We will plan for repeat fasting lipids in 12 months    6. Long-term insulin use (HCC)  Patient is on long-term insulin therapy for type 1 diabetes      Return in about 3 months (around 9/15/2020).      Thank you kindly for allowing me to participate in the diabetes care plan for this patient.    Favio Bowens MD, FACE, [facility]  03/13/20    CC:   Calos Ingram M.D.

## 2020-07-12 DIAGNOSIS — Z96.41 PRESENCE OF INSULIN PUMP: ICD-10-CM

## 2020-07-12 DIAGNOSIS — E78.2 DM TYPE 1 WITH DIABETIC MIXED HYPERLIPIDEMIA (HCC): ICD-10-CM

## 2020-07-12 DIAGNOSIS — E10.69 DM TYPE 1 WITH DIABETIC MIXED HYPERLIPIDEMIA (HCC): ICD-10-CM

## 2020-07-12 DIAGNOSIS — E10.9 DIABETES MELLITUS TYPE 1, CONTROLLED, INSULIN DEPENDENT (HCC): ICD-10-CM

## 2020-07-20 DIAGNOSIS — E78.2 DM TYPE 1 WITH DIABETIC MIXED HYPERLIPIDEMIA (HCC): ICD-10-CM

## 2020-07-20 DIAGNOSIS — Z96.41 PRESENCE OF INSULIN PUMP: ICD-10-CM

## 2020-07-20 DIAGNOSIS — E10.69 DM TYPE 1 WITH DIABETIC MIXED HYPERLIPIDEMIA (HCC): ICD-10-CM

## 2020-07-20 DIAGNOSIS — E10.9 DIABETES MELLITUS TYPE 1, CONTROLLED, INSULIN DEPENDENT (HCC): ICD-10-CM

## 2020-08-07 ENCOUNTER — PATIENT OUTREACH (OUTPATIENT)
Dept: HEALTH INFORMATION MANAGEMENT | Facility: OTHER | Age: 65
End: 2020-08-07

## 2020-08-07 NOTE — PROGRESS NOTES
Called patient to schedule for AWV.    Outcome:   Spoke to patient. Scheduled appointment for 10/13/20.   Confirmed no respiratory symptoms and no exposure to COVID-19.    Attempt # 1    -aep

## 2020-09-16 ENCOUNTER — OFFICE VISIT (OUTPATIENT)
Dept: ENDOCRINOLOGY | Facility: MEDICAL CENTER | Age: 65
End: 2020-09-16
Attending: INTERNAL MEDICINE
Payer: MEDICARE

## 2020-09-16 VITALS
DIASTOLIC BLOOD PRESSURE: 74 MMHG | HEIGHT: 69 IN | WEIGHT: 179.6 LBS | OXYGEN SATURATION: 98 % | BODY MASS INDEX: 26.6 KG/M2 | SYSTOLIC BLOOD PRESSURE: 122 MMHG | HEART RATE: 68 BPM

## 2020-09-16 DIAGNOSIS — E03.9 ACQUIRED HYPOTHYROIDISM: ICD-10-CM

## 2020-09-16 DIAGNOSIS — Z96.41 PRESENCE OF INSULIN PUMP: ICD-10-CM

## 2020-09-16 DIAGNOSIS — E78.2 DM TYPE 1 WITH DIABETIC MIXED HYPERLIPIDEMIA (HCC): ICD-10-CM

## 2020-09-16 DIAGNOSIS — E78.5 DYSLIPIDEMIA: ICD-10-CM

## 2020-09-16 DIAGNOSIS — E10.69 DM TYPE 1 WITH DIABETIC MIXED HYPERLIPIDEMIA (HCC): ICD-10-CM

## 2020-09-16 DIAGNOSIS — E10.9 CONTROLLED DIABETES MELLITUS TYPE 1 WITHOUT COMPLICATIONS (HCC): ICD-10-CM

## 2020-09-16 DIAGNOSIS — E06.3 HASHIMOTO'S DISEASE: ICD-10-CM

## 2020-09-16 DIAGNOSIS — Z79.4 ENCOUNTER FOR LONG-TERM (CURRENT) USE OF INSULIN (HCC): ICD-10-CM

## 2020-09-16 LAB
HBA1C MFR BLD: 6.7 % (ref 0–5.6)
INT CON NEG: NEGATIVE
INT CON POS: POSITIVE

## 2020-09-16 PROCEDURE — 99214 OFFICE O/P EST MOD 30 MIN: CPT | Performed by: INTERNAL MEDICINE

## 2020-09-16 PROCEDURE — 83036 HEMOGLOBIN GLYCOSYLATED A1C: CPT | Performed by: INTERNAL MEDICINE

## 2020-09-16 PROCEDURE — 95251 CONT GLUC MNTR ANALYSIS I&R: CPT | Performed by: INTERNAL MEDICINE

## 2020-09-16 PROCEDURE — 99213 OFFICE O/P EST LOW 20 MIN: CPT | Performed by: INTERNAL MEDICINE

## 2020-09-16 ASSESSMENT — FIBROSIS 4 INDEX: FIB4 SCORE: 1.48

## 2020-09-16 NOTE — PROGRESS NOTES
CHIEF COMPLAINT: Patient is here for follow up of Type 1 Diabetes Mellitus    HPI:     Amaury Garner is a 64 y.o. male with Type 1 Diabetes Mellitus here for follow up.    Labs from September 15, 2020 showed an improved A1c of 6.7%  Labs from 6/15/2020 showed an a1c of 7.5%  Labs from March 13, 2019 showed a fair A1c of 7.2%.    He was previously followed by LOREN[facility] Salinas  He has a history of primary hypothyroidism, and coronary artery disease aside from type 1 diabetes.  He is on insulin pump therapy with a Medtronic 670 G pump.  His insurance does not cover the Guardian sensor thus he is using a Dexcom G6 CGM.      Current pump settings are as follows:    Midnight 0.800 units/h  5 AM 1.0 units/h  8 AM 1.0 units/h  12 noon 0.900 units/h  6 PM 0.950 units/h    Insulin to carbohydrate ratio midnight 6.9  5:30 AM 6.9 g  5:30 AM 5.7  11 AM 9.0  4 PM 6.0    Sensitivity 35,    Target blood sugar   midnight 100-130,  5 PM ,  8 -130      He reports that his A1c is better after I made some recommendations with regards to changing his insulin infusions insertion sites on his last visit.    Download of his CGM today shows a fair average blood sugar of 134 with time in range of 93% which is fair  Review shows few episodes of hypoglycemia overnight        He has hypothyroidism and is taking levothyroxine 75 MCG daily.  His last TSH was fair at 2.1 on April 2020.  He denies palpitations, tremors, cold intolerance and heat intolerance.    He has hyperlipidemia and is taking Lipitor 40 mg daily.  He is tolerating this well.   His last LDL cholesterol was well controlled at 58 with triglycerides of 40 and total cholesterol 141 on April 2020.      BG Diary:  Please see Dexcom G6 CGM download    Weight has been stable    Diabetes Complications   Retinopathy: Known retinopathy.  Last eye exam: February 2020 with [facility]  Neuropathy: Denies paresthesias or numbness in hands or feet. Denies any foot  wounds.  Exercise: Minimal.  Diet: Fair.  Patient's medications, allergies, and social histories were reviewed and updated as appropriate.    ROS:     CONS:     No fever, no chills   EYES:     No diplopia, no blurry vision   CV:           No chest pain, no palpitations   PULM:     No SOB, no cough, no hemoptysis.   GI:            No nausea, no vomiting, no diarrhea, no constipation   ENDO:     No polyuria, no polydipsia, no heat intolerance, no cold intolerance       Past Medical History:  Problem List:  2020-06: Hashimoto's disease  2019-09: Primary osteoarthritis of right knee  2019-09: Synovial cyst of right knee  2017-08: Non-seasonal allergic rhinitis due to pollen  2017-05: History of colonic polyps  2017-02: Coronary artery disease due to lipid rich plaque - s/p PCI   to LAD with recurrent PCI for in stent restenosis   2017-02: Encounter for long-term (current) use of insulin ([facility])  2017-02: Fitting and adjustment of insulin pump  2017-02: Presence of insulin pump  2017-02: DM type 1 with diabetic mixed hyperlipidemia ([facility])  2017-02: Hypothyroidism  2017-02: Diabetes mellitus type 1, controlled, insulin dependent ([facility])  2017-02: Prostatism  2017-02: Insulin pump in place  2017-02: Stent in LAD - 5/2003 with subsequent cutting balloon   angioplasty 2004 repeat angio 2008 in setting of abnormal stress   showed patent stent  Dyslipidemia      Past Surgical History:  Past Surgical History:   Procedure Laterality Date   • PB COLONOSCOPY,DIAGNOSTIC N/A 3/17/2020    Procedure: COLONOSCOPY;  Surgeon: Julius Batista M.D.;  Location: SURGERY [facility];  Service: Gen Robotic        Allergies:  Patient has no known allergies.     Social History:  Social History     Tobacco Use   • Smoking status: Never Smoker   • Smokeless tobacco: Never Used   Substance Use Topics   • Alcohol use: Yes     Drinks per session: 1 or 2   • Drug use: No        Family History:   family history includes Alzheimer's Disease in his  "mother; Cancer in his father.      PHYSICAL EXAM:   OBJECTIVE:  Vital signs: /74 (BP Location: Left arm, Patient Position: Sitting, BP Cuff Size: Adult)   Pulse 68   Ht 1.753 m (5' 9\")   Wt 81.5 kg (179 lb 9.6 oz)   SpO2 98%   BMI 26.52 kg/m²   GENERAL: Well-developed, well-nourished in no apparent distress.   EYE:  No ocular asymmetry, PERRLA  HENT: Pink, moist mucous membranes.    NECK: No thyromegaly.   CARDIOVASCULAR:  No murmurs  LUNGS: Clear breath sounds  ABDOMEN: Soft, nontender   EXTREMITIES: No clubbing, cyanosis, or edema.   NEUROLOGICAL: No gross focal motor abnormalities   LYMPH: No cervical adenopathy palpated.   SKIN: No rashes, lesions.   Monofilament testing with a 10 gram force: sensation: intact bilaterally  Visual Inspection: Feet without maceration, ulcers, or fissures.  Pedal pulses: intact bilaterally    Labs:  Lab Results   Component Value Date/Time    HBA1C 6.7 (A) 09/16/2020 09:14 AM        Lab Results   Component Value Date/Time    WBC 5.0 04/04/2020 08:20 AM    RBC 5.12 04/04/2020 08:20 AM    HEMOGLOBIN 15.4 04/04/2020 08:20 AM    MCV 89.5 04/04/2020 08:20 AM    MCH 30.1 04/04/2020 08:20 AM    MCHC 33.6 04/04/2020 08:20 AM    RDW 15.9 (H) 04/04/2020 08:20 AM    MPV 9.8 04/04/2020 08:20 AM       Lab Results   Component Value Date/Time    SODIUM 143 04/04/2020 08:20 AM    POTASSIUM 4.3 04/04/2020 08:20 AM    CHLORIDE 105 04/04/2020 08:20 AM    CO2 27 04/04/2020 08:20 AM    ANION 15 04/04/2020 08:20 AM    GLUCOSE 86 04/04/2020 08:20 AM    BUN 22 (H) 04/04/2020 08:20 AM    CREATININE 1.0 04/04/2020 08:20 AM    CALCIUM 8.3 (L) 04/04/2020 08:20 AM    ASTSGOT 30 04/04/2020 08:20 AM    ALTSGPT 39 04/04/2020 08:20 AM    TBILIRUBIN 0.5 04/04/2020 08:20 AM    ALBUMIN 3.8 04/04/2020 08:20 AM    TOTPROTEIN 7.0 04/04/2020 08:20 AM    AGRATIO 1.2 04/04/2020 08:20 AM       Lab Results   Component Value Date/Time    CHOLSTRLTOT 138 10/16/2019 0825    TRIGLYCERIDE 35 10/16/2019 0825    HDL 65.0 " (H) 10/16/2019 0825    LDL 66 10/16/2019 0825    CHOLHDLRAT 2.12 10/16/2019 0825    NONHDL 73 10/16/2019 0825       Lab Results   Component Value Date/Time    MALBCRT 3 04/04/2020 08:20 AM    MICROALBUR 2.6 04/04/2020 08:20 AM        Lab Results   Component Value Date/Time    TSHULTRASEN 2.10 04/17/2019 0757     No results found for: FREEDIR  No results found for: FREET3  No results found for: THYSTIMIG        ASSESSMENT/PLAN:     1. Controlled diabetes mellitus type 1 without complications (HCC)  Well-controlled  Therapeutic CGM was downloaded and reviewed  Continue current pump settings except I adjusted the midnight basal rate to 0.775 units/h because he was having a few episodes of hypoglycemia  Recommend that he watch his carb intake  Recommend regular exercise  I will see him again in 3 months with repeat of his A1c      2. Acquired hypothyroidism  Controlled  Continue current levothyroxine dose 75 MCG daily  Repeating another TSH in 3 months    3. Hashimoto's disease  This is the likely etiology of his hypothyroidism.  10% of individuals with Hashimoto's have negative antibodies    4. Dyslipidemia  Controlled  Continue atorvastatin  Repeat fasting lipids in 6 to 12 months      5. Presence of insulin pump  Insulin pump is in place    6. Encounter for long-term (current) use of insulin (HCC)  Patient is on long-term insulin therapy for type 1 diabetes      Return in about 3 months (around 12/16/2020).      Thank you kindly for allowing me to participate in the diabetes care plan for this patient.    Favio Bowens MD, FACE, [facility]  03/13/20    CC:   Calos Ingram M.D.

## 2020-12-21 ENCOUNTER — OFFICE VISIT (OUTPATIENT)
Dept: ENDOCRINOLOGY | Facility: MEDICAL CENTER | Age: 65
End: 2020-12-21
Attending: INTERNAL MEDICINE
Payer: MEDICARE

## 2020-12-21 VITALS
HEIGHT: 69 IN | HEART RATE: 74 BPM | DIASTOLIC BLOOD PRESSURE: 68 MMHG | WEIGHT: 180 LBS | BODY MASS INDEX: 26.66 KG/M2 | SYSTOLIC BLOOD PRESSURE: 120 MMHG | OXYGEN SATURATION: 96 %

## 2020-12-21 DIAGNOSIS — Z79.4 ENCOUNTER FOR LONG-TERM (CURRENT) USE OF INSULIN (HCC): ICD-10-CM

## 2020-12-21 DIAGNOSIS — E06.3 HASHIMOTO'S DISEASE: ICD-10-CM

## 2020-12-21 DIAGNOSIS — Z96.41 PRESENCE OF INSULIN PUMP: ICD-10-CM

## 2020-12-21 DIAGNOSIS — E03.9 ACQUIRED HYPOTHYROIDISM: ICD-10-CM

## 2020-12-21 DIAGNOSIS — E10.9 CONTROLLED DIABETES MELLITUS TYPE 1 WITHOUT COMPLICATIONS (HCC): ICD-10-CM

## 2020-12-21 DIAGNOSIS — E78.5 DYSLIPIDEMIA: ICD-10-CM

## 2020-12-21 PROCEDURE — 99213 OFFICE O/P EST LOW 20 MIN: CPT | Performed by: INTERNAL MEDICINE

## 2020-12-21 PROCEDURE — 99214 OFFICE O/P EST MOD 30 MIN: CPT | Performed by: INTERNAL MEDICINE

## 2020-12-21 PROCEDURE — 95251 CONT GLUC MNTR ANALYSIS I&R: CPT | Performed by: INTERNAL MEDICINE

## 2020-12-21 ASSESSMENT — FIBROSIS 4 INDEX: FIB4 SCORE: 1.44

## 2020-12-21 NOTE — PROGRESS NOTES
CHIEF COMPLAINT: Patient is here for follow up of Type 1 Diabetes Mellitus    HPI:     Amaury Garner is a 64 y.o. male with Type 1 Diabetes Mellitus here for follow up.    Labs from 10/6/2020 show a1c mehul to 7.1%  Labs from September 15, 2020 showed an improved A1c of 6.7%  Labs from 6/15/2020 showed an a1c of 7.5%  Labs from March 13, 2019 showed a fair A1c of 7.2%.    He was previously followed by LOREN[facility] Salinas  He has a history of primary hypothyroidism, and coronary artery disease aside from type 1 diabetes.  He is on insulin pump therapy with a Medtronic 670 G pump.  His insurance does not cover the Guardian sensor thus he is using a Dexcom G6 CGM.      Current pump settings are as follows:    Midnight 0.800 units/h  5 AM 1.0 units/h  8 AM 1.0 units/h  12 noon 0.900 units/h  6 PM 0.950 units/h    Insulin to carbohydrate ratio midnight 6.9  5:30 AM 6.9 g  5:30 AM 5.7  11 AM 9.0  4 PM 6.0    Sensitivity 35,    Target blood sugar   midnight 100-130,  5 PM ,  8 -130      He admits that he doesn't bolus for lunch which explains hyperglycemia seen on his CGM     Download of his CGM today shows a fair average blood sugar of 156 (134)  with time in range of 66% (93%) which is worse  Review shows few episodes of hyperglycemia after lunch (200-220)        He has hypothyroidism and is taking Levothyroxine 75 MCG daily.  His last TSH was fair at 1.5 on Oct 2020.  He denies palpitations, tremors, cold intolerance and heat intolerance.      He has hyperlipidemia and is taking Lipitor 40 mg daily plus Zetia 10mg daily    He is tolerating both very well.   His last LDL cholesterol was well controlled at 58 with triglycerides of 40 and total cholesterol 141 on April 2020.      BG Diary:  Please see Dexcom G6 CGM download    Weight has been stable    Diabetes Complications   Retinopathy: Known retinopathy.  Last eye exam: February 2020 with [facility]  Neuropathy: Denies paresthesias or numbness in  hands or feet. Denies any foot wounds.  Exercise: Minimal.  Diet: Fair.  Patient's medications, allergies, and social histories were reviewed and updated as appropriate.    ROS:     CONS:     No fever, no chills   EYES:     No diplopia, no blurry vision   CV:           No chest pain, no palpitations   PULM:     No SOB, no cough, no hemoptysis.   GI:            No nausea, no vomiting, no diarrhea, no constipation   ENDO:     No polyuria, no polydipsia, no heat intolerance, no cold intolerance       Past Medical History:  Problem List:  2020-06: Hashimoto's disease  2019-09: Primary osteoarthritis of right knee  2019-09: Synovial cyst of right knee  2017-08: Non-seasonal allergic rhinitis due to pollen  2017-05: History of colonic polyps  2017-02: Coronary artery disease due to lipid rich plaque - s/p PCI   to LAD with recurrent PCI for in stent restenosis   2017-02: Encounter for long-term (current) use of insulin ([facility])  2017-02: Fitting and adjustment of insulin pump  2017-02: Presence of insulin pump  2017-02: DM type 1 with diabetic mixed hyperlipidemia ([facility])  2017-02: Hypothyroidism  2017-02: Diabetes mellitus type 1, controlled, insulin dependent ([facility])  2017-02: Prostatism  2017-02: Insulin pump in place  2017-02: Stent in LAD - 5/2003 with subsequent cutting balloon   angioplasty 2004 repeat angio 2008 in setting of abnormal stress   showed patent stent  Dyslipidemia      Past Surgical History:  Past Surgical History:   Procedure Laterality Date   • PB COLONOSCOPY,DIAGNOSTIC N/A 3/17/2020    Procedure: COLONOSCOPY;  Surgeon: Julius Batista M.D.;  Location: SURGERY [facility];  Service: Gen Robotic        Allergies:  Patient has no known allergies.     Social History:  Social History     Tobacco Use   • Smoking status: Never Smoker   • Smokeless tobacco: Never Used   Substance Use Topics   • Alcohol use: Yes     Drinks per session: 1 or 2   • Drug use: No        Family History:   family history includes  Alzheimer's Disease in his mother; Cancer in his father.      PHYSICAL EXAM:   OBJECTIVE:  Vital signs: There were no vitals taken for this visit.  GENERAL: Well-developed, well-nourished in no apparent distress.   EYE:  No ocular asymmetry, PERRLA  HENT: Pink, moist mucous membranes.    NECK: No thyromegaly.   CARDIOVASCULAR:  No murmurs  LUNGS: Clear breath sounds  ABDOMEN: Soft, nontender   EXTREMITIES: No clubbing, cyanosis, or edema.   NEUROLOGICAL: No gross focal motor abnormalities   LYMPH: No cervical adenopathy palpated.   SKIN: No rashes, lesions.     Labs:  Lab Results   Component Value Date/Time    HBA1C 7.1 (H) 10/06/2020 01:33 PM        Lab Results   Component Value Date/Time    WBC 5.0 04/04/2020 08:20 AM    RBC 5.12 04/04/2020 08:20 AM    HEMOGLOBIN 15.4 04/04/2020 08:20 AM    MCV 89.5 04/04/2020 08:20 AM    MCH 30.1 04/04/2020 08:20 AM    MCHC 33.6 04/04/2020 08:20 AM    RDW 15.9 (H) 04/04/2020 08:20 AM    MPV 9.8 04/04/2020 08:20 AM       Lab Results   Component Value Date/Time    SODIUM 139 10/06/2020 01:10 PM    POTASSIUM 4.2 10/06/2020 01:10 PM    CHLORIDE 102 10/06/2020 01:10 PM    CO2 25 10/06/2020 01:10 PM    ANION 16 10/06/2020 01:10 PM    GLUCOSE 122 (H) 10/06/2020 01:10 PM    BUN 19 (H) 10/06/2020 01:10 PM    CREATININE 1.2 10/06/2020 01:10 PM    CALCIUM 8.6 10/06/2020 01:10 PM    ASTSGOT 26 10/06/2020 01:10 PM    ALTSGPT 31 10/06/2020 01:10 PM    TBILIRUBIN 0.4 10/06/2020 01:10 PM    ALBUMIN 3.6 10/06/2020 01:10 PM    TOTPROTEIN 6.7 10/06/2020 01:10 PM    AGRATIO 1.2 10/06/2020 01:10 PM       Lab Results   Component Value Date/Time    CHOLSTRLTOT 138 10/16/2019 0825    TRIGLYCERIDE 35 10/16/2019 0825    HDL 65.0 (H) 10/16/2019 0825    LDL 66 10/16/2019 0825    CHOLHDLRAT 2.12 10/16/2019 0825    NONHDL 73 10/16/2019 0825       Lab Results   Component Value Date/Time    MALBCRT 5 10/19/2020 11:30 AM    MICROALBUR 5.0 10/19/2020 11:30 AM        Lab Results   Component Value Date/Time     TSHULTRASEN 2.10 04/17/2019 0757     No results found for: FREEDIR  No results found for: FREET3  No results found for: THYSTIMIG        ASSESSMENT/PLAN:     1. Controlled diabetes mellitus type 1 without complications (HCC)  Well-controlled  Therapeutic CGM was downloaded and reviewed  Continue current pump settings except I adjusted the midnight basal rate to 0.775 units/h because he was having a few episodes of hypoglycemia  Recommend that he watch his carb intake  Recommend regular exercise  I will see him again in 3 months with repeat of his A1c      2. Acquired hypothyroidism  Controlled  Continue current levothyroxine dose 75 MCG daily  Repeating another TSH in 3 months    3. Hashimoto's disease  This is the likely etiology of his hypothyroidism.  10% of individuals with Hashimoto's have negative antibodies    4. Dyslipidemia  Controlled  Continue atorvastatin  Repeat fasting lipids in 6 to 12 months      5. Presence of insulin pump  Insulin pump is in place    6. Encounter for long-term (current) use of insulin (HCC)  Patient is on long-term insulin therapy for type 1 diabetes      Return in about 3 months (around 3/21/2021).      Thank you kindly for allowing me to participate in the diabetes care plan for this patient.    Favio Bowens MD, FACE, [facility]  03/13/20    CC:   Calos Ingram M.D.

## 2021-03-30 ENCOUNTER — OFFICE VISIT (OUTPATIENT)
Dept: ENDOCRINOLOGY | Facility: MEDICAL CENTER | Age: 66
End: 2021-03-30
Attending: INTERNAL MEDICINE
Payer: MEDICARE

## 2021-03-30 VITALS
BODY MASS INDEX: 27.15 KG/M2 | SYSTOLIC BLOOD PRESSURE: 118 MMHG | DIASTOLIC BLOOD PRESSURE: 64 MMHG | OXYGEN SATURATION: 100 % | HEART RATE: 75 BPM | WEIGHT: 183.3 LBS | HEIGHT: 69 IN

## 2021-03-30 DIAGNOSIS — Z96.41 PRESENCE OF INSULIN PUMP: ICD-10-CM

## 2021-03-30 DIAGNOSIS — Z79.4 ENCOUNTER FOR LONG-TERM (CURRENT) USE OF INSULIN (HCC): ICD-10-CM

## 2021-03-30 DIAGNOSIS — E10.9 CONTROLLED DIABETES MELLITUS TYPE 1 WITHOUT COMPLICATIONS (HCC): ICD-10-CM

## 2021-03-30 DIAGNOSIS — E03.9 ACQUIRED HYPOTHYROIDISM: ICD-10-CM

## 2021-03-30 DIAGNOSIS — E78.5 DYSLIPIDEMIA: ICD-10-CM

## 2021-03-30 DIAGNOSIS — E06.3 HASHIMOTO'S DISEASE: ICD-10-CM

## 2021-03-30 PROCEDURE — 99212 OFFICE O/P EST SF 10 MIN: CPT | Performed by: INTERNAL MEDICINE

## 2021-03-30 PROCEDURE — 99214 OFFICE O/P EST MOD 30 MIN: CPT | Performed by: INTERNAL MEDICINE

## 2021-03-30 PROCEDURE — 95251 CONT GLUC MNTR ANALYSIS I&R: CPT | Performed by: INTERNAL MEDICINE

## 2021-03-30 ASSESSMENT — FIBROSIS 4 INDEX: FIB4 SCORE: 1.87

## 2021-03-30 NOTE — PROGRESS NOTES
CHIEF COMPLAINT: Patient is here for follow up of Type 1 Diabetes Mellitus    HPI:     Amaury Garner is a 64 y.o. male with Type 1 Diabetes Mellitus here for follow up.    Labs from 3/15/2021 show A1c improved to 7%  Labs from 10/6/2020 show a1c was 7.1%  Labs from 9/15/2020 show a1c was 6.7%  Labs from 6/15/2020 show a1c was 7.5%      He was previously followed by LOREN[facility] Salinas  He has a history of primary hypothyroidism, and coronary artery disease aside from type 1 diabetes.  He is on insulin pump therapy with a Medtronic 670 G pump.  His insurance does not cover the Guardian sensor thus he is using a Dexcom G6 CGM.      Current pump settings are as follows:    Midnight 0.775 units/h  5 AM 0.825 units/h  8 AM 0.825 units/h  12 noon 0.900 units/h  6 PM 0.950 units/h    Insulin to carbohydrate ratio midnight 6.9  5:30 AM 6.9 g  5:30 AM 5.7  11 AM 9.0  4 PM 6.0    Sensitivity 35,    Target blood sugar   midnight 100-130,  5 PM ,  8 -130          Download of his CGM today shows a fair average blood sugar of 154 (156)  with time in range of 70% (69%) which is better    Since last visit he started bolusing for lunch and now the only problem is there is a pattern of hyperglycemia after dinner      He has hypothyroidism and is taking Levothyroxine 75 MCG daily.  His last TSH was normal at 2.28 on March 2021 he denies palpitations, tremors, cold intolerance and heat intolerance.      He has hyperlipidemia and is taking Lipitor 40 mg daily plus Zetia 10mg daily    He is tolerating both very well.   His last LDL cholesterol was well controlled at 50 with triglycerides of 37 on March 15, 2021    He does not have diabetic kidney disease or microalbuminuria        BG Diary:  Please see Dexcom G6 CGM download    Weight has been stable    Diabetes Complications   Retinopathy: Known retinopathy.  Last eye exam: February 9, 2021 with [facility]  Neuropathy: Denies paresthesias or numbness in hands or feet.  Denies any foot wounds.  Exercise: Minimal.  Diet: Fair.  Patient's medications, allergies, and social histories were reviewed and updated as appropriate.    ROS:     CONS:     No fever, no chills   EYES:     No diplopia, no blurry vision   CV:           No chest pain, no palpitations   PULM:     No SOB, no cough, no hemoptysis.   GI:            No nausea, no vomiting, no diarrhea, no constipation   ENDO:     No polyuria, no polydipsia, no heat intolerance, no cold intolerance       Past Medical History:  Problem List:  2020-06: Hashimoto's disease  2019-09: Primary osteoarthritis of right knee  2019-09: Synovial cyst of right knee  2017-08: Non-seasonal allergic rhinitis due to pollen  2017-05: History of colonic polyps  2017-02: Coronary artery disease due to lipid rich plaque - s/p PCI   to LAD with recurrent PCI for in stent restenosis   2017-02: Encounter for long-term (current) use of insulin ([facility])  2017-02: Fitting and adjustment of insulin pump  2017-02: Presence of insulin pump  2017-02: DM type 1 with diabetic mixed hyperlipidemia ([facility])  2017-02: Hypothyroidism  2017-02: Diabetes mellitus type 1, controlled, insulin dependent ([facility])  2017-02: Prostatism  2017-02: Insulin pump in place  2017-02: Stent in LAD - 5/2003 with subsequent cutting balloon   angioplasty 2004 repeat angio 2008 in setting of abnormal stress   showed patent stent  Dyslipidemia      Past Surgical History:  Past Surgical History:   Procedure Laterality Date   • PB COLONOSCOPY,DIAGNOSTIC N/A 3/17/2020    Procedure: COLONOSCOPY;  Surgeon: Julius Batista M.D.;  Location: SURGERY [facility];  Service: Gen Robotic        Allergies:  Patient has no known allergies.     Social History:  Social History     Tobacco Use   • Smoking status: Never Smoker   • Smokeless tobacco: Never Used   Substance Use Topics   • Alcohol use: Yes   • Drug use: No        Family History:   family history includes Alzheimer's Disease in his mother; Cancer in  "his father.      PHYSICAL EXAM:   OBJECTIVE:  Vital signs: /64 (BP Location: Left arm, Patient Position: Sitting, BP Cuff Size: Adult)   Pulse 75   Ht 1.753 m (5' 9\")   Wt 83.1 kg (183 lb 4.8 oz)   SpO2 100%   BMI 27.07 kg/m²   GENERAL: Well-developed, well-nourished in no apparent distress.   EYE:  No ocular asymmetry, PERRLA  HENT: Pink, moist mucous membranes.    NECK: No thyromegaly.   CARDIOVASCULAR:  No murmurs  LUNGS: Clear breath sounds  ABDOMEN: Soft, nontender   EXTREMITIES: No clubbing, cyanosis, or edema.   NEUROLOGICAL: No gross focal motor abnormalities   LYMPH: No cervical adenopathy palpated.   SKIN: No rashes, lesions.   Monofilament testing with a 10 gram force: sensation: intact bilaterally  Visual Inspection: Feet without maceration, ulcers, or fissures.  Pedal pulses: intact bilaterally    Labs:  Lab Results   Component Value Date/Time    HBA1C 7.0 (H) 03/15/2021 08:44 AM        Lab Results   Component Value Date/Time    WBC 5.0 04/04/2020 08:20 AM    RBC 5.12 04/04/2020 08:20 AM    HEMOGLOBIN 15.4 04/04/2020 08:20 AM    MCV 89.5 04/04/2020 08:20 AM    MCH 30.1 04/04/2020 08:20 AM    MCHC 33.6 04/04/2020 08:20 AM    RDW 15.9 (H) 04/04/2020 08:20 AM    MPV 9.8 04/04/2020 08:20 AM       Lab Results   Component Value Date/Time    SODIUM 140 03/15/2021 08:15 AM    POTASSIUM 4.4 03/15/2021 08:15 AM    CHLORIDE 104 03/15/2021 08:15 AM    CO2 27 03/15/2021 08:15 AM    ANION 13 03/15/2021 08:15 AM    GLUCOSE 123 (H) 03/15/2021 08:15 AM    BUN 19 (H) 03/15/2021 08:15 AM    CREATININE 1.1 03/15/2021 08:15 AM    CALCIUM 8.5 03/15/2021 08:15 AM    ASTSGOT 37 03/15/2021 08:15 AM    ALTSGPT 37 03/15/2021 08:15 AM    TBILIRUBIN 0.6 03/15/2021 08:15 AM    ALBUMIN 3.6 03/15/2021 08:15 AM    TOTPROTEIN 6.7 03/15/2021 08:15 AM    AGRATIO 1.2 03/15/2021 08:15 AM       Lab Results   Component Value Date/Time    CHOLSTRLTOT 138 10/16/2019 0825    TRIGLYCERIDE 35 10/16/2019 0825    HDL 65.0 (H) 10/16/2019 " 0825    LDL 66 10/16/2019 0825    CHOLHDLRAT 2.12 10/16/2019 0825    NONHDL 73 10/16/2019 0825       Lab Results   Component Value Date/Time    MALBCRT 8 03/15/2021 08:15 AM    MICROALBUR 8.0 03/15/2021 08:15 AM        Lab Results   Component Value Date/Time    TSHULTRASEN 2.10 04/17/2019 0757     No results found for: FREEDIR  No results found for: FREET3  No results found for: THYSTIMIG        ASSESSMENT/PLAN:     1. Controlled diabetes mellitus type 1 without complications (HCC)  Improved control  Therapeutic CGM was downloaded and reviewed  Adjusted basal rate settings at 6 PM to 1.0 units/h  Recommend that he watch his carb intake  Recommend regular exercise  He is up-to-date with his eye exam  I will see him again in 3 months with repeat of his A1c      2. Acquired hypothyroidism  Controlled  Continue current levothyroxine dose 75 MCG daily  Repeating another TSH in 6 months    3. Hashimoto's disease  This is the likely etiology of his hypothyroidism.  10% of individuals with Hashimoto's have negative antibodies    4. Dyslipidemia  Controlled  Continue atorvastatin  Repeat fasting lipids in 12 months      5. Presence of insulin pump  Insulin pump is in place    6. Encounter for long-term (current) use of insulin (HCC)  Patient is on long-term insulin therapy for type 1 diabetes      Return in about 3 months (around 6/30/2021).      Thank you kindly for allowing me to participate in the diabetes care plan for this patient.    Favio Bowens MD, FACE, [facility]  03/13/20    CC:   Calos Ingram M.D.

## 2021-03-31 ENCOUNTER — APPOINTMENT (RX ONLY)
Dept: URBAN - METROPOLITAN AREA CLINIC 31 | Facility: CLINIC | Age: 66
Setting detail: DERMATOLOGY
End: 2021-03-31

## 2021-03-31 DIAGNOSIS — L85.3 XEROSIS CUTIS: ICD-10-CM

## 2021-03-31 DIAGNOSIS — L11.1 TRANSIENT ACANTHOLYTIC DERMATOSIS [GROVER]: ICD-10-CM

## 2021-03-31 PROCEDURE — ? ADDITIONAL NOTES

## 2021-03-31 PROCEDURE — ? COUNSELING

## 2021-03-31 PROCEDURE — 99202 OFFICE O/P NEW SF 15 MIN: CPT

## 2021-03-31 ASSESSMENT — LOCATION DETAILED DESCRIPTION DERM
LOCATION DETAILED: LEFT PROXIMAL PRETIBIAL REGION
LOCATION DETAILED: LEFT ANTERIOR PROXIMAL THIGH
LOCATION DETAILED: RIGHT DISTAL PRETIBIAL REGION
LOCATION DETAILED: INFERIOR THORACIC SPINE
LOCATION DETAILED: STERNUM
LOCATION DETAILED: RIGHT ANTERIOR PROXIMAL THIGH

## 2021-03-31 ASSESSMENT — LOCATION ZONE DERM
LOCATION ZONE: TRUNK
LOCATION ZONE: LEG

## 2021-03-31 ASSESSMENT — LOCATION SIMPLE DESCRIPTION DERM
LOCATION SIMPLE: UPPER BACK
LOCATION SIMPLE: RIGHT PRETIBIAL REGION
LOCATION SIMPLE: RIGHT THIGH
LOCATION SIMPLE: CHEST
LOCATION SIMPLE: LEFT THIGH
LOCATION SIMPLE: LEFT PRETIBIAL REGION

## 2021-03-31 NOTE — PROCEDURE: COUNSELING
Patient Specific Counseling (Will Not Stick From Patient To Patient): continue tac cream prn as per Dr Lawler.
Detail Level: Simple
Detail Level: Zone

## 2021-03-31 NOTE — PROCEDURE: ADDITIONAL NOTES
Render Risk Assessment In Note?: no
Additional Notes: Test Cerave cream on the forearm for five days to be sure he is not allergic to it. If no rash with using Cerave cream, he can apply it to the entire body daily. \\n\\nAdvised to continue Betamethasone cream prn as per Dr Lawler.
Detail Level: Simple

## 2021-04-02 PROBLEM — Z85.828 HISTORY OF BASAL CELL CANCER: Status: ACTIVE | Noted: 2021-04-02

## 2021-04-02 PROBLEM — L30.9 ECZEMA: Status: ACTIVE | Noted: 2021-04-02

## 2021-04-02 PROBLEM — L85.3 XEROSIS OF SKIN: Status: ACTIVE | Noted: 2021-04-02

## 2021-07-07 ENCOUNTER — OFFICE VISIT (OUTPATIENT)
Dept: ENDOCRINOLOGY | Facility: MEDICAL CENTER | Age: 66
End: 2021-07-07
Attending: INTERNAL MEDICINE
Payer: MEDICARE

## 2021-07-07 VITALS
WEIGHT: 184.9 LBS | HEIGHT: 69 IN | SYSTOLIC BLOOD PRESSURE: 116 MMHG | OXYGEN SATURATION: 96 % | HEART RATE: 84 BPM | DIASTOLIC BLOOD PRESSURE: 56 MMHG | BODY MASS INDEX: 27.39 KG/M2

## 2021-07-07 DIAGNOSIS — E78.5 DYSLIPIDEMIA: ICD-10-CM

## 2021-07-07 DIAGNOSIS — E06.3 HASHIMOTO'S DISEASE: ICD-10-CM

## 2021-07-07 DIAGNOSIS — E10.9 CONTROLLED DIABETES MELLITUS TYPE 1 WITHOUT COMPLICATIONS (HCC): ICD-10-CM

## 2021-07-07 DIAGNOSIS — E03.9 ACQUIRED HYPOTHYROIDISM: ICD-10-CM

## 2021-07-07 DIAGNOSIS — Z96.41 PRESENCE OF INSULIN PUMP: ICD-10-CM

## 2021-07-07 DIAGNOSIS — Z79.4 ENCOUNTER FOR LONG-TERM (CURRENT) USE OF INSULIN (HCC): ICD-10-CM

## 2021-07-07 LAB
HBA1C MFR BLD: 7.5 % (ref 0–5.6)
INT CON NEG: NEGATIVE
INT CON POS: POSITIVE

## 2021-07-07 PROCEDURE — 83036 HEMOGLOBIN GLYCOSYLATED A1C: CPT | Performed by: INTERNAL MEDICINE

## 2021-07-07 PROCEDURE — 95251 CONT GLUC MNTR ANALYSIS I&R: CPT | Performed by: INTERNAL MEDICINE

## 2021-07-07 PROCEDURE — 99214 OFFICE O/P EST MOD 30 MIN: CPT | Performed by: INTERNAL MEDICINE

## 2021-07-07 PROCEDURE — 99213 OFFICE O/P EST LOW 20 MIN: CPT | Performed by: INTERNAL MEDICINE

## 2021-07-07 ASSESSMENT — FIBROSIS 4 INDEX: FIB4 SCORE: 2.09

## 2021-07-07 NOTE — PROGRESS NOTES
CHIEF COMPLAINT: Patient is here for follow up of Type 1 Diabetes Mellitus    HPI:     Amaury Garner is a 65 y.o. male with Type 1 Diabetes Mellitus here for follow up.    Labs from 3/15/2021 show A1c improved to 7%  Labs from 10/6/2020 show a1c was 7.1%  Labs from 9/15/2020 show a1c was 6.7%  Labs from 6/15/2020 show a1c was 7.5%      He was previously followed by LORENZO Salinas  He has a history of primary hypothyroidism, and coronary artery disease aside from type 1 diabetes.  He is on insulin pump therapy with a Medtronic 670 G pump.  His insurance does not cover the Guardian sensor thus he is using a Dexcom G6 CGM.      Current pump settings are as follows:    Midnight 0.800 units/h  5 AM 1.15 units/h  8 AM 0.925 units/h  12 noon 0.925 units/h  6 PM 0.900 units/h    Insulin to carbohydrate ratio midnight 6.9  5:30 AM 6.9 g  5:30 AM 5.7  11 AM 9.0  4 PM 6.0    Sensitivity 35,    Target blood sugar   midnight 100-130,  5 PM ,  8 -130          Download of his CGM today shows average sugar is higher at 172 (154) (156)  with time in range of 55% (70%) (69%) which is worse compared to previous    He admits that he has not been bolusing on time   There is a pattern of hyperglycemia after lunch and after dinner      He has hypothyroidism and is taking Levothyroxine 75 MCG daily.  His last TSH was normal at 2.28 on March 2021 he denies palpitations, tremors, cold intolerance and heat intolerance.      He has hyperlipidemia and is taking Lipitor 40 mg daily plus Zetia 10mg daily    He is tolerating both very well.   His last LDL cholesterol was well controlled at 50 with triglycerides of 37 on March 15, 2021    He does not have diabetic kidney disease or microalbuminuria        BG Diary:  Please see Dexcom G6 CGM download    Weight has been stable    Diabetes Complications   Retinopathy: Known retinopathy.  Last eye exam: February 9, 2021 with [facility] eye [facility]  Neuropathy: Denies paresthesias or  numbness in hands or feet. Denies any foot wounds.  Exercise: Minimal.  Diet: Fair.  Patient's medications, allergies, and social histories were reviewed and updated as appropriate.    ROS:     CONS:     No fever, no chills   EYES:     No diplopia, no blurry vision   CV:           No chest pain, no palpitations   PULM:     No SOB, no cough, no hemoptysis.   GI:            No nausea, no vomiting, no diarrhea, no constipation   ENDO:     No polyuria, no polydipsia, no heat intolerance, no cold intolerance       Past Medical History:  Problem List:  2021-04: Eczema  2021-04: History of basal cell cancer  2021-04: Xerosis of skin  2020-06: Hashimoto's disease  2019-09: Primary osteoarthritis of right knee  2019-09: Synovial cyst of right knee  2017-08: Non-seasonal allergic rhinitis due to pollen  2017-05: History of colonic polyps  2017-02: Coronary artery disease due to lipid rich plaque - s/p PCI   to LAD with recurrent PCI for in stent restenosis   2017-02: Encounter for long-term (current) use of insulin ([facility])  2017-02: Fitting and adjustment of insulin pump  2017-02: Presence of insulin pump  2017-02: DM type 1 with diabetic mixed hyperlipidemia (HCC)  2017-02: Hypothyroidism  2017-02: Controlled diabetes mellitus type 1 without complications   ([facility])  2017-02: Prostatism  2017-02: Insulin pump in place  2017-02: Stent in LAD - 5/2003 with subsequent cutting balloon   angioplasty 2004 repeat angio 2008 in setting of abnormal stress   showed patent stent  Dyslipidemia      Past Surgical History:  Past Surgical History:   Procedure Laterality Date   • PB COLONOSCOPY,DIAGNOSTIC N/A 3/17/2020    Procedure: COLONOSCOPY;  Surgeon: Julius Batista M.D.;  Location: SURGERY [facility];  Service: Gen Robotic        Allergies:  Patient has no known allergies.     Social History:  Social History     Tobacco Use   • Smoking status: Never Smoker   • Smokeless tobacco: Never Used   Vaping Use   • Vaping Use: Never used  "  Substance Use Topics   • Alcohol use: Yes     Comment: 1 glass of scotch nightly   • Drug use: No        Family History:   family history includes Alzheimer's Disease in his mother; Cancer in his father.      PHYSICAL EXAM:   OBJECTIVE:  Vital signs: /56 (BP Location: Left arm, Patient Position: Sitting, BP Cuff Size: Adult)   Pulse 84   Ht 1.753 m (5' 9\")   Wt 83.9 kg (184 lb 14.4 oz)   SpO2 96%   BMI 27.30 kg/m²   GENERAL: Well-developed, well-nourished in no apparent distress.   EYE:  No ocular asymmetry, PERRLA  HENT: Pink, moist mucous membranes.    NECK: No thyromegaly.   CARDIOVASCULAR:  No murmurs  LUNGS: Clear breath sounds  ABDOMEN: Soft, nontender   EXTREMITIES: No clubbing, cyanosis, or edema.   NEUROLOGICAL: No gross focal motor abnormalities   LYMPH: No cervical adenopathy palpated.   SKIN: No rashes, lesions.       Labs:  Lab Results   Component Value Date/Time    HBA1C 7.5 (A) 07/07/2021 09:00 AM        Lab Results   Component Value Date/Time    WBC 5.9 06/08/2021 08:20 AM    RBC 4.53 (L) 06/08/2021 08:20 AM    HEMOGLOBIN 14.4 06/08/2021 08:20 AM    MCV 94.3 (H) 06/08/2021 08:20 AM    MCH 31.8 (H) 06/08/2021 08:20 AM    MCHC 33.7 06/08/2021 08:20 AM    RDW 13.1 06/08/2021 08:20 AM    MPV 10.0 06/08/2021 08:20 AM       Lab Results   Component Value Date/Time    SODIUM 140 03/15/2021 08:15 AM    POTASSIUM 4.4 03/15/2021 08:15 AM    CHLORIDE 104 03/15/2021 08:15 AM    CO2 27 03/15/2021 08:15 AM    ANION 13 03/15/2021 08:15 AM    GLUCOSE 123 (H) 03/15/2021 08:15 AM    BUN 19 (H) 03/15/2021 08:15 AM    CREATININE 1.1 03/15/2021 08:15 AM    CALCIUM 8.5 03/15/2021 08:15 AM    ASTSGOT 37 03/15/2021 08:15 AM    ALTSGPT 37 03/15/2021 08:15 AM    TBILIRUBIN 0.6 03/15/2021 08:15 AM    ALBUMIN 3.6 03/15/2021 08:15 AM    TOTPROTEIN 6.7 03/15/2021 08:15 AM    AGRATIO 1.2 03/15/2021 08:15 AM       Lab Results   Component Value Date/Time    CHOLSTRLTOT 138 10/16/2019 0825    TRIGLYCERIDE 35 10/16/2019 " 0825    HDL 65.0 (H) 10/16/2019 0825    LDL 66 10/16/2019 0825    CHOLHDLRAT 2.12 10/16/2019 0825    NONHDL 73 10/16/2019 0825       Lab Results   Component Value Date/Time    MALBCRT 8 03/15/2021 08:15 AM    MICROALBUR 8.0 03/15/2021 08:15 AM        Lab Results   Component Value Date/Time    TSHULTRASEN 2.10 04/17/2019 0757     No results found for: FREEDIR  No results found for: FREET3  No results found for: THYSTIMIG        ASSESSMENT/PLAN:     1. Controlled diabetes mellitus type 1 without complications (HCC)  Slight deterioration in control A1c 7.5%  Therapeutic CGM was downloaded and reviewed  No changes made on basal rates  Adjusted carb ratio for lunch to 7.0 from 9.0  Recommend that he bolus before the meals  Recommend regular exercise  He is up-to-date with his eye exam  Follow-up with diabetes nurse in 3-month see me in 6 months      2. Acquired hypothyroidism  Controlled  Continue current levothyroxine dose 75 MCG daily  Repeat TSH in 3 months    3. Hashimoto's disease  This is the likely etiology of his hypothyroidism.    10% of individuals with Hashimoto's have negative antibodies    4. Dyslipidemia  Controlled  Continue atorvastatin  Repeat fasting lipids next year      5. Presence of insulin pump  Insulin pump is in place    6. Encounter for long-term (current) use of insulin (HCC)  Patient is on long-term insulin therapy for type 1 diabetes      Return in about 3 months (around 10/7/2021).      Thank you kindly for allowing me to participate in the diabetes care plan for this patient.    Favio Bowens MD, FACE, [facility]  03/13/20    CC:   Calos Ingram M.D.

## 2021-07-16 DIAGNOSIS — Z96.41 PRESENCE OF INSULIN PUMP: ICD-10-CM

## 2021-07-16 NOTE — TELEPHONE ENCOUNTER
Received request via: Pharmacy    Was the patient seen in the last year in this department? Yes    Does the patient have an active prescription (recently filled or refills available) for medication(s) requested? No       insulin aspart (NOVOLOG) 100 UNIT/ML Solution    Sig: INJECT 40 TO 70 UNITS UNDER THE SKIN CONTINUOUS AS DIRECTED

## 2021-07-26 ENCOUNTER — TELEPHONE (OUTPATIENT)
Dept: ENDOCRINOLOGY | Facility: MEDICAL CENTER | Age: 66
End: 2021-07-26

## 2021-07-26 DIAGNOSIS — Z96.41 PRESENCE OF INSULIN PUMP: ICD-10-CM

## 2021-07-26 NOTE — TELEPHONE ENCOUNTER
VOICEMAIL  1. Caller Name: Amaury Garner                        Call Back Number: [phone]      2. Message: Patient called and left a voicemail pt needs a PA for Novolog. It needs to go under part B.     3. Patient approves office to leave a detailed voicemail/MyChart message: yes    Patient issue has been taken care of under mychart message today. Please patient message for 07/24/21.

## 2021-10-20 ENCOUNTER — NON-PROVIDER VISIT (OUTPATIENT)
Dept: ENDOCRINOLOGY | Facility: MEDICAL CENTER | Age: 66
End: 2021-10-20
Attending: INTERNAL MEDICINE
Payer: MEDICARE

## 2021-10-20 VITALS
HEART RATE: 80 BPM | OXYGEN SATURATION: 96 % | BODY MASS INDEX: 27.95 KG/M2 | WEIGHT: 188.7 LBS | HEIGHT: 69 IN | DIASTOLIC BLOOD PRESSURE: 62 MMHG | SYSTOLIC BLOOD PRESSURE: 124 MMHG

## 2021-10-20 DIAGNOSIS — E10.65 UNCONTROLLED TYPE 1 DIABETES MELLITUS WITH HYPERGLYCEMIA (HCC): ICD-10-CM

## 2021-10-20 DIAGNOSIS — E78.5 DYSLIPIDEMIA: ICD-10-CM

## 2021-10-20 DIAGNOSIS — E03.9 ACQUIRED HYPOTHYROIDISM: ICD-10-CM

## 2021-10-20 PROCEDURE — 99213 OFFICE O/P EST LOW 20 MIN: CPT | Performed by: INTERNAL MEDICINE

## 2021-10-20 ASSESSMENT — FIBROSIS 4 INDEX: FIB4 SCORE: 1.38

## 2021-10-20 NOTE — PROGRESS NOTES
"RN-CDE Note    Subjective:     HPI:  Amaury Garner is a 66 y.o. old patient who is seen by the Diabetes Nurse Secialist today for review of his type 1 diabetes on insulin pump   Changes in Health: none    Diabetes Medications:   Novolog via insulin pump  Basal rates  12 am 0.75  5 am 0.9  8 am 0.925  12m 0.875  6 m 0.975  Carbohydrate   12 am 6.9  5:30 am 5.7  11 am 7.0  4 pm 6.0  Sensitivity  35  Target  12 am 100-130  5 am   8 pm 100-130  Insulin action 4 hours  Taking above medications as prescribed: yes  Taking daily ASA: Yes    Exercise: active, walking every morning.   Diet: \"healthy\" diet  in general  Patient's body mass index is 27.87 kg/m². Exercise and nutrition counseling were performed at this visit.      Health Maintenance:   Health Maintenance Due   Topic Date Due   • HEPATITIS C SCREENING  Never done   • IMM PNEUMOCOCCAL VACCINE: 65+ Years (2 of 2 - PPSV23) 10/02/2019   • IMM DTaP/Tdap/Td Vaccine (2 - Td or Tdap) 01/03/2021         DM:   Last A1c:   Lab Results   Component Value Date/Time    HBA1C 6.7 (A) 10/19/2021 03:20 PM      Previous A1c was 7.5 on 7/7/21  A1C GOAL: < 7    Glucose monitoring frequency: using Dexcom CGM, reviewed with patient and downloaded to media    Hypoglycemic episodes: yes - on occasaion    Last Retinal Exam: on file and up-to-date      Lab Results   Component Value Date/Time    MALBCRT 2 10/19/2021 04:12 PM    MICROALBUR 2.3 10/19/2021 04:12 PM        ACR Albumin/Creatinine Ratio goal <30     HTN:   Blood pressure goal <140/<80 .   Currently Rx ACE/ARB: Yes     Dyslipidemia:    Lab Results   Component Value Date/Time    CHOLSTRLTOT 133 03/15/2021 08:15 AM    LDL 50 03/15/2021 08:15 AM    HDL 76.0 (H) 03/15/2021 08:15 AM    TRIGLYCERIDE 37 03/15/2021 08:15 AM         Currently Rx Statin: Yes     He  reports that he has never smoked. He has never used smokeless tobacco.    Objective:     Exam:  Monofilament: not done    Plan:     Discussed and educated on:   - All " medications, side effects and compliance (discussed carefully)  - HbA1C: target  - Home glucose monitoring emphasized  - Weight control and daily exercise    Recommended medication changes: none

## 2022-01-11 ENCOUNTER — OFFICE VISIT (OUTPATIENT)
Dept: ENDOCRINOLOGY | Facility: MEDICAL CENTER | Age: 67
End: 2022-01-11
Attending: INTERNAL MEDICINE
Payer: MEDICARE

## 2022-01-11 VITALS
RESPIRATION RATE: 16 BRPM | HEIGHT: 69 IN | BODY MASS INDEX: 27.98 KG/M2 | SYSTOLIC BLOOD PRESSURE: 104 MMHG | DIASTOLIC BLOOD PRESSURE: 58 MMHG | OXYGEN SATURATION: 96 % | WEIGHT: 188.9 LBS | HEART RATE: 78 BPM

## 2022-01-11 DIAGNOSIS — Z96.41 PRESENCE OF INSULIN PUMP: ICD-10-CM

## 2022-01-11 DIAGNOSIS — E03.9 ACQUIRED HYPOTHYROIDISM: ICD-10-CM

## 2022-01-11 DIAGNOSIS — E78.5 DYSLIPIDEMIA: ICD-10-CM

## 2022-01-11 DIAGNOSIS — E10.9 CONTROLLED DIABETES MELLITUS TYPE 1 WITHOUT COMPLICATIONS (HCC): ICD-10-CM

## 2022-01-11 DIAGNOSIS — Z79.4 LONG-TERM INSULIN USE (HCC): ICD-10-CM

## 2022-01-11 PROCEDURE — 99212 OFFICE O/P EST SF 10 MIN: CPT | Performed by: INTERNAL MEDICINE

## 2022-01-11 PROCEDURE — 99214 OFFICE O/P EST MOD 30 MIN: CPT | Performed by: INTERNAL MEDICINE

## 2022-01-11 PROCEDURE — 95251 CONT GLUC MNTR ANALYSIS I&R: CPT | Performed by: INTERNAL MEDICINE

## 2022-01-11 ASSESSMENT — FIBROSIS 4 INDEX: FIB4 SCORE: 1.38

## 2022-01-11 ASSESSMENT — PATIENT HEALTH QUESTIONNAIRE - PHQ9: CLINICAL INTERPRETATION OF PHQ2 SCORE: 0

## 2022-01-11 NOTE — PROGRESS NOTES
CHIEF COMPLAINT: Patient is here for follow up of Type 1 Diabetes Mellitus    HPI:     Amaury Garner is a 65 y.o. male with Type 1 Diabetes Mellitus here for follow up.    Labs from  10/19/2021 show a1c is 6.7%  Labs from 3/15/2021 show A1c was 7.0%  Labs from 10/6/2020 show a1c was 7.1%  Labs from 9/15/2020 show a1c was 6.7%  Labs from 6/15/2020 show a1c was 7.5%      He was previously followed by LORENZO Salinas  He has a history of primary hypothyroidism, and coronary artery disease aside from type 1 diabetes.  He is on insulin pump therapy with a Medtronic 670 G pump.  His insurance does not cover the Guardian sensor thus he is using a Dexcom G6 CGM.      Current pump settings are as follows:    Midnight 0.750 units/h  5 AM 0.900 units/h  8 AM 0.900 units/h  12 noon 0.875 units/h  6 PM 0.900 units/h    Insulin to carbohydrate ratio midnight 6.9  5:30 AM 6.9 g  5:30 AM 5.7  11 AM 7.0  4 PM 6.0    Sensitivity 35,    Target blood sugar   midnight 100-130,  5 PM ,  8 -130          Download of his CGM today shows average sugar is 139 (172) (154) (156)  with time in range of 78% (55%) (70%) (69%) which is better    He has hyperlipidemia and is taking Lipitor 40 mg daily plus Zetia 10mg daily    He is tolerating both very well.   His last LDL cholesterol was well controlled at 55 on Jan 2022      He does not have diabetic kidney disease or microalbuminuria  UACR was < 30 on Oct 2021      He has hypothyroidism and is taking Levothyroxine 75 MCG daily.  His last TSH was normal at 2.24 on Jan 2022  He denies  palpitations, tremors, cold intolerance and heat intolerance.          BG Diary:  Please see Dexcom G6 CGM download    Weight has been stable    Diabetes Complications   Retinopathy: Known retinopathy.  Last eye exam: February 9, 2021 with [facility] eye [facility]  Neuropathy: Denies paresthesias or numbness in hands or feet. Denies any foot wounds.  Exercise: Minimal.  Diet: Fair.  Patient's medications,  allergies, and social histories were reviewed and updated as appropriate.    ROS:     CONS:     No fever, no chills   EYES:     No diplopia, no blurry vision   CV:           No chest pain, no palpitations   PULM:     No SOB, no cough, no hemoptysis.   GI:            No nausea, no vomiting, no diarrhea, no constipation   ENDO:     No polyuria, no polydipsia, no heat intolerance, no cold intolerance       Past Medical History:  Problem List:  2021-04: Eczema  2021-04: History of basal cell cancer  2021-04: Xerosis of skin  2020-06: Hashimoto's disease  2019-09: Primary osteoarthritis of right knee  2019-09: Synovial cyst of right knee  2017-08: Non-seasonal allergic rhinitis due to pollen  2017-05: History of colonic polyps  2017-02: Coronary artery disease due to lipid rich plaque - s/p PCI   to LAD with recurrent PCI for in stent restenosis   2017-02: Encounter for long-term (current) use of insulin ([facility])  2017-02: Fitting and adjustment of insulin pump  2017-02: Presence of insulin pump  2017-02: DM type 1 with diabetic mixed hyperlipidemia ([facility])  2017-02: Hypothyroidism  2017-02: Controlled diabetes mellitus type 1 without complications   ([facility])  2017-02: Prostatism  2017-02: Insulin pump in place  2017-02: Stent in LAD - 5/2003 with subsequent cutting balloon   angioplasty 2004 repeat angio 2008 in setting of abnormal stress   showed patent stent  Dyslipidemia      Past Surgical History:  Past Surgical History:   Procedure Laterality Date   • DC COLONOSCOPY,DIAGNOSTIC N/A 3/17/2020    Procedure: COLONOSCOPY;  Surgeon: Julius Batista M.D.;  Location: SURGERY [facility];  Service: Gen Robotic        Allergies:  Patient has no known allergies.     Social History:  Social History     Tobacco Use   • Smoking status: Never Smoker   • Smokeless tobacco: Never Used   Vaping Use   • Vaping Use: Never used   Substance Use Topics   • Alcohol use: Yes     Comment: 1 glass of scotch nightly   • Drug use: No     "    Family History:   family history includes Alzheimer's Disease in his mother; Cancer in his father.      PHYSICAL EXAM:   OBJECTIVE:  Vital signs: /58 (BP Location: Left arm, Patient Position: Sitting, BP Cuff Size: Adult)   Pulse 78   Resp 16   Ht 1.753 m (5' 9\")   Wt 85.7 kg (188 lb 14.4 oz)   SpO2 96%   BMI 27.90 kg/m²   GENERAL: Well-developed, well-nourished in no apparent distress.   EYE:  No ocular asymmetry, PERRLA  HENT: Pink, moist mucous membranes.    NECK: No thyromegaly.   CARDIOVASCULAR:  No murmurs  LUNGS: Clear breath sounds  ABDOMEN: Soft, nontender   EXTREMITIES: No clubbing, cyanosis, or edema.   NEUROLOGICAL: No gross focal motor abnormalities   LYMPH: No cervical adenopathy palpated.   SKIN: No rashes, lesions.       Labs:  Lab Results   Component Value Date/Time    HBA1C 6.7 (A) 10/19/2021 03:20 PM        Lab Results   Component Value Date/Time    WBC 5.9 06/08/2021 08:20 AM    RBC 4.53 (L) 06/08/2021 08:20 AM    HEMOGLOBIN 14.4 06/08/2021 08:20 AM    MCV 94.3 (H) 06/08/2021 08:20 AM    MCH 31.8 (H) 06/08/2021 08:20 AM    MCHC 33.7 06/08/2021 08:20 AM    RDW 13.1 06/08/2021 08:20 AM    MPV 10.0 06/08/2021 08:20 AM       Lab Results   Component Value Date/Time    SODIUM 138 09/27/2021 12:51 PM    POTASSIUM 4.3 09/27/2021 12:51 PM    CHLORIDE 105 09/27/2021 12:51 PM    CO2 25 09/27/2021 12:51 PM    ANION 12 09/27/2021 12:51 PM    GLUCOSE 141 (H) 09/27/2021 12:51 PM    BUN 30 (H) 09/27/2021 12:51 PM    CREATININE 1.2 09/27/2021 12:51 PM    CALCIUM 8.5 09/27/2021 12:51 PM    ASTSGOT 22 09/27/2021 12:51 PM    ALTSGPT 31 09/27/2021 12:51 PM    TBILIRUBIN 0.5 09/27/2021 12:51 PM    ALBUMIN 3.7 09/27/2021 12:51 PM    TOTPROTEIN 6.8 09/27/2021 12:51 PM    AGRATIO 1.2 09/27/2021 12:51 PM       Lab Results   Component Value Date/Time    CHOLSTRLTOT 138 10/16/2019 0825    TRIGLYCERIDE 35 10/16/2019 0825    HDL 65.0 (H) 10/16/2019 0825    LDL 66 10/16/2019 0825    CHOLHDLRAT 2.12 10/16/2019 " 0825    NONHDL 73 10/16/2019 0825       Lab Results   Component Value Date/Time    MALBCRT 2 10/19/2021 04:12 PM    MICROALBUR 2.3 10/19/2021 04:12 PM        Lab Results   Component Value Date/Time    TSHULTRASEN 2.10 04/17/2019 0757     No results found for: FREEDIR  No results found for: FREET3  No results found for: THYSTIMIG        ASSESSMENT/PLAN:     1. Controlled diabetes mellitus type 1 without complications (HCC)  A1c improved to 6.7%  Therapeutic CGM was downloaded and reviewed  No changes made on basal rates  Foot exam today   He is up-to-date with his eye exam  In 3 months with repeat of CMP and urine microalbumin  Follow-up with diabetes nurse in 3-month see me in 6 months      2. Acquired hypothyroidism  Controlled  Continue current levothyroxine dose 75 MCG daily  Repeat TSH in 6 months    3. Hashimoto's disease  This is the likely etiology of his hypothyroidism.    10% of individuals with Hashimoto's have negative antibodies    4. Dyslipidemia  Controlled  Continue atorvastatin  Repeat fasting lipids in 12 mos      5. Presence of insulin pump  Insulin pump is in place    6. Encounter for long-term (current) use of insulin (HCC)  Patient is on long-term insulin therapy for type 1 diabetes      Return in about 3 months (around 4/11/2022).      Thank you kindly for allowing me to participate in the diabetes care plan for this patient.    Favio Bowens MD, JAKI, [facility]  03/13/20    CC:   Calos Ingram M.D.

## 2022-04-20 ENCOUNTER — NON-PROVIDER VISIT (OUTPATIENT)
Dept: ENDOCRINOLOGY | Facility: MEDICAL CENTER | Age: 67
End: 2022-04-20
Attending: INTERNAL MEDICINE
Payer: MEDICARE

## 2022-04-20 VITALS
HEIGHT: 69 IN | DIASTOLIC BLOOD PRESSURE: 62 MMHG | BODY MASS INDEX: 28.08 KG/M2 | WEIGHT: 189.6 LBS | SYSTOLIC BLOOD PRESSURE: 98 MMHG | OXYGEN SATURATION: 95 % | HEART RATE: 74 BPM

## 2022-04-20 DIAGNOSIS — Z96.41 PRESENCE OF INSULIN PUMP: ICD-10-CM

## 2022-04-20 DIAGNOSIS — Z79.4 LONG-TERM INSULIN USE (HCC): ICD-10-CM

## 2022-04-20 DIAGNOSIS — E10.9 CONTROLLED DIABETES MELLITUS TYPE 1 WITHOUT COMPLICATIONS (HCC): ICD-10-CM

## 2022-04-20 LAB
HBA1C MFR BLD: 6.6 % (ref 0–5.6)
INT CON NEG: ABNORMAL
INT CON POS: ABNORMAL

## 2022-04-20 PROCEDURE — 99212 OFFICE O/P EST SF 10 MIN: CPT | Performed by: INTERNAL MEDICINE

## 2022-04-20 PROCEDURE — 83036 HEMOGLOBIN GLYCOSYLATED A1C: CPT

## 2022-04-20 RX ORDER — MONTELUKAST SODIUM 10 MG/1
10 TABLET ORAL
COMMUNITY
Start: 2022-01-31 | End: 2022-07-15

## 2022-04-20 ASSESSMENT — FIBROSIS 4 INDEX: FIB4 SCORE: 1.58

## 2022-04-20 NOTE — PROGRESS NOTES
"RN-CDE Note    Subjective:     HPI:  Amaury Garner is a 66 y.o. old patient who is seen by the Diabetes Nurse Specialist today for review of his type 1 diabetes on insulin pump   Changes in Health: denies any changes.     Diabetes Medications:   Novolog via pump  Basal rate  12 am 0.725  5 am 0.9  8 am 0.850  12 pm 0.8  6 pm 0.9  Carbohydrate ratio  12 am 6.9  5:30 am 5.7  11 am 9.5  4 pm 6.0  Sensitivity  35*  Target  12 am 100-130  5 am   8 pm 100-130  Insulin action time 4 hours  Taking above medications as prescribed: yes  Taking daily ASA: Yes    Exercise: states walking about  2 miles every morning.  3 days a week does an extra 60 minutes of exercise.   Diet: \"healthy\" diet  in general  Patient's body mass index is 28 kg/m². Exercise and nutrition counseling were performed at this visit.      Health Maintenance:   Health Maintenance Due   Topic Date Due   • HEPATITIS C SCREENING  Never done   • IMM PNEUMOCOCCAL VACCINE: 65+ Years (3 - PPSV23 or PCV20) 08/17/2018   • IMM DTaP/Tdap/Td Vaccine (2 - Td or Tdap) 01/03/2021   • COVID-19 Vaccine (3 - Booster for Moderna series) 09/01/2021   • A1C SCREENING  01/19/2022         DM:   Last A1c:   Lab Results   Component Value Date/Time    HBA1C 6.6 (A) 04/20/2022 09:52 AM      Previous A1c was 6.7 on 10/19/21  A1C GOAL: < 7    Glucose monitoring frequency: using Dexcom CGM, download available in media.   In target range of  80% of the time      Hypoglycemic episodes: yes - occasional    Last Retinal Exam: on file and up-to-date  Daily Foot Exam: Yes  Denies problems.     Exam:  Monofilament: current due 1/11/23    Lab Results   Component Value Date/Time    MALBCRT 7 04/11/2022 10:46 AM    MICROALBUR 4.9 04/11/2022 10:46 AM        ACR Albumin/Creatinine Ratio goal <30     HTN:   Blood pressure goal <140/<80 at goal.   Currently Rx ACE/ARB: Yes     Dyslipidemia:    Lab Results   Component Value Date/Time    CHOLSTRLTOT 122 01/03/2022 08:26 AM    LDL 55 " 01/03/2022 08:26 AM    HDL 60.0 01/03/2022 08:26 AM    TRIGLYCERIDE 34 01/03/2022 08:26 AM         Currently Rx Statin: Yes     He  reports that he has never smoked. He has never used smokeless tobacco.      Plan:     Discussed and educated on:   - All medications, side effects and compliance (discussed carefully)  - Annual eye examinations at Ophthalmology  - Foot Care: what to look for when checking feet every day  - HbA1C: target  - Weight control and daily exercise    Recommended medication changes: none.

## 2022-07-15 ENCOUNTER — OFFICE VISIT (OUTPATIENT)
Dept: ENDOCRINOLOGY | Facility: MEDICAL CENTER | Age: 67
End: 2022-07-15
Attending: INTERNAL MEDICINE
Payer: MEDICARE

## 2022-07-15 VITALS
OXYGEN SATURATION: 96 % | WEIGHT: 180 LBS | HEART RATE: 84 BPM | DIASTOLIC BLOOD PRESSURE: 60 MMHG | SYSTOLIC BLOOD PRESSURE: 104 MMHG | BODY MASS INDEX: 26.66 KG/M2 | HEIGHT: 69 IN

## 2022-07-15 DIAGNOSIS — E78.5 DYSLIPIDEMIA: ICD-10-CM

## 2022-07-15 DIAGNOSIS — Z96.41 PRESENCE OF INSULIN PUMP: ICD-10-CM

## 2022-07-15 DIAGNOSIS — Z79.4 LONG-TERM INSULIN USE (HCC): ICD-10-CM

## 2022-07-15 DIAGNOSIS — E10.9 CONTROLLED DIABETES MELLITUS TYPE 1 WITHOUT COMPLICATIONS (HCC): ICD-10-CM

## 2022-07-15 DIAGNOSIS — E03.9 ACQUIRED HYPOTHYROIDISM: ICD-10-CM

## 2022-07-15 LAB — HBA1C MFR BLD: 6.4 % (ref 0–5.6)

## 2022-07-15 PROCEDURE — 95251 CONT GLUC MNTR ANALYSIS I&R: CPT | Performed by: INTERNAL MEDICINE

## 2022-07-15 PROCEDURE — 99214 OFFICE O/P EST MOD 30 MIN: CPT | Performed by: INTERNAL MEDICINE

## 2022-07-15 PROCEDURE — 99213 OFFICE O/P EST LOW 20 MIN: CPT | Performed by: INTERNAL MEDICINE

## 2022-07-15 RX ORDER — MONTELUKAST SODIUM 10 MG/1
10 TABLET ORAL
COMMUNITY
Start: 2022-01-31 | End: 2023-02-03

## 2022-07-15 ASSESSMENT — FIBROSIS 4 INDEX: FIB4 SCORE: 1.58

## 2022-07-15 NOTE — PROGRESS NOTES
RN-CDE Note    Subjective:   Endocrinology Clinic Progress Note  PCP: Calos Ingram M.D.    HPI:  Amaury Garner is a 66 y.o. old patient who is seen today for review of Type 1 Diabetes and Hypothyroidism.   Recent changes in health: Health good.  DM:   Last A1c:   Lab Results   Component Value Date/Time    HBA1C 6.4 (A) 07/15/2022 12:00 AM      Previous A1c was 6.6 on 4/20/22  A1C GOAL: < 7    Diabetes Medications:   Novolog in pump      Exercise: Walking and exercising daily  Diet: Carbohydrate counting  Patient's body mass index is 26.58 kg/m². Exercise and nutrition counseling were performed at this visit.    Glucose monitoring frequency: See pump download    Hypoglycemic episodes: yes - with yard work  Last Retinal Exam: on file and up-to-date  Daily Foot Exam: Yes   Foot Exam:  Monofilament: done  Monofilament testing with a 10 gram force: sensation intact: intact bilaterally.  Feet feel puffy under his toes.  Left foot pain on top of foot first thing in the morning.  Visual Inspection: Feet without maceration, ulcers, fissures.  Pedal pulses: intact bilaterally   Lab Results   Component Value Date/Time    MALBCRT 7 04/11/2022 10:46 AM    MICROALBUR 4.9 04/11/2022 10:46 AM     He  reports that he has never smoked. He has never used smokeless tobacco.      Plan:     Discussed and educated on:   - All medications, side effects and compliance (discussed carefully)  - Annual eye examinations at Ophthalmology  - Home glucose monitoring emphasized  - Weight control and daily exercise    Recommended medication changes: No changes at this time.

## 2022-07-15 NOTE — PROGRESS NOTES
CHIEF COMPLAINT: Patient is here for follow up of Type 1 Diabetes Mellitus    HPI:     Amaury Garner is a 65 y.o. male with Type 1 Diabetes Mellitus here for follow up.    Labs from 7/15/2022 show a1c is 6.4%  Labs from 4/20/2021 show a1c was 6.6%  Labs from  10/19/2021 show a1c was 6.7%  Labs from 3/15/2021 show A1c was 7.0%  Labs from 10/6/2020 show a1c was 7.1%  Labs from 9/15/2020 show a1c was 6.7%  Labs from 6/15/2020 show a1c was 7.5%      He was previously followed by LORENZO Salinas  He has a history of primary hypothyroidism, and coronary artery disease aside from type 1 diabetes.  He is on insulin pump therapy with a [employer]estronic 670 G pump.  His insurance does not cover the Guardian sensor thus he is using a Dexcom G6 CGM.      Current pump settings are as follows:    Midnight 0.725 units/h  5 AM 0.900 units/h  8 AM 0.850 units/h  12 noon 0.800 units/h- --- > 0.750  6 PM 0.900 units/h    Insulin to carbohydrate ratio midnight 6.9  5:30 AM 6.9 g  5:30 AM 5.7  11 AM 9.0  4 PM 6.0    Sensitivity 35,    Target blood sugar   midnight 100-130,  5 PM ,  8 -130          Download of his CGM today shows average sugar is 126 (139 172 154 156)  with time in range of 88% (78% 55% 70% 69%) which is better    He has hyperlipidemia and is taking Lipitor 40 mg daily plus Zetia 10mg daily    He is tolerating both very well.   His last LDL cholesterol was well controlled at 55 on Jan 2022      He does not have diabetic kidney disease or microalbuminuria  UACR was < 30 on 4/11/2022      He has hypothyroidism and is taking Levothyroxine 75 MCG daily.  His last TSH was normal at 2.24 on Jan 2022  He denies  palpitations, tremors, cold intolerance and heat intolerance.          BG Diary:  Please see Dexcom G6 CGM download    Weight has been stable    Diabetes Complications   Retinopathy: Known retinopathy.  Last eye exam: February 9, 2021 with [facility]  Neuropathy: Denies paresthesias or numbness in hands  or feet. Denies any foot wounds.  Exercise: Minimal.  Diet: Fair.  Patient's medications, allergies, and social histories were reviewed and updated as appropriate.    ROS:     CONS:     No fever, no chills   EYES:     No diplopia, no blurry vision   CV:           No chest pain, no palpitations   PULM:     No SOB, no cough, no hemoptysis.   GI:            No nausea, no vomiting, no diarrhea, no constipation   ENDO:     No polyuria, no polydipsia, no heat intolerance, no cold intolerance       Past Medical History:  Problem List:  2021-04: Eczema  2021-04: History of basal cell cancer  2021-04: Xerosis of skin  2020-06: Hashimoto's disease  2019-09: Primary osteoarthritis of right knee  2019-09: Synovial cyst of right knee  2017-08: Non-seasonal allergic rhinitis due to pollen  2017-05: History of colonic polyps  2017-02: Coronary artery disease due to lipid rich plaque - s/p PCI   to LAD with recurrent PCI for in stent restenosis   2017-02: Long-term insulin use (HCC)  2017-02: Fitting and adjustment of insulin pump  2017-02: Presence of insulin pump  2017-02: DM type 1 with diabetic mixed hyperlipidemia (HCC)  2017-02: Hypothyroidism  2017-02: Controlled diabetes mellitus type 1 without complications   ([facility])  2017-02: Prostatism  2017-02: Insulin pump in place  2017-02: Stent in LAD - 5/2003 with subsequent cutting balloon   angioplasty 2004 repeat angio 2008 in setting of abnormal stress   showed patent stent  Dyslipidemia      Past Surgical History:  Past Surgical History:   Procedure Laterality Date   • [place] COLONOSCOPY,DIAGNOSTIC N/A 3/17/2020    Procedure: COLONOSCOPY;  Surgeon: Julius Batista M.D.;  Location: SURGERY [facility];  Service: Gen Robotic        Allergies:  Patient has no known allergies.     Social History:  Social History     Tobacco Use   • Smoking status: Never Smoker   • Smokeless tobacco: Never Used   Vaping Use   • Vaping Use: Never used   Substance Use Topics   • Alcohol use: Yes      "Comment: 1 glass of scotch nightly   • Drug use: No        Family History:   family history includes Alzheimer's Disease in his mother; Cancer in his father.      PHYSICAL EXAM:   OBJECTIVE:  Vital signs: Ht 1.753 m (5' 9\")   Wt 81.6 kg (180 lb)   SpO2 96%   BMI 26.58 kg/m²   GENERAL: Well-developed, well-nourished in no apparent distress.   EYE:  No ocular asymmetry, PERRLA  HENT: Pink, moist mucous membranes.    NECK: No thyromegaly.   CARDIOVASCULAR:  No murmurs  LUNGS: Clear breath sounds  ABDOMEN: Soft, nontender   EXTREMITIES: No clubbing, cyanosis, or edema.   NEUROLOGICAL: No gross focal motor abnormalities   LYMPH: No cervical adenopathy palpated.   SKIN: No rashes, lesions.       Labs:  Lab Results   Component Value Date/Time    HBA1C 6.6 (A) 04/20/2022 09:52 AM        Lab Results   Component Value Date/Time    WBC 5.9 06/08/2021 08:20 AM    RBC 4.53 (L) 06/08/2021 08:20 AM    HEMOGLOBIN 14.4 06/08/2021 08:20 AM    MCV 94.3 (H) 06/08/2021 08:20 AM    MCH 31.8 (H) 06/08/2021 08:20 AM    MCHC 33.7 06/08/2021 08:20 AM    RDW 13.1 06/08/2021 08:20 AM    MPV 10.0 06/08/2021 08:20 AM       Lab Results   Component Value Date/Time    SODIUM 140 04/11/2022 10:46 AM    POTASSIUM 4.4 04/11/2022 10:46 AM    CHLORIDE 107 04/11/2022 10:46 AM    CO2 28 04/11/2022 10:46 AM    ANION 9 (L) 04/11/2022 10:46 AM    GLUCOSE 165 (H) 04/11/2022 10:46 AM    BUN 27 (H) 04/11/2022 10:46 AM    CREATININE 1.2 04/11/2022 10:46 AM    CALCIUM 8.4 (L) 04/11/2022 10:46 AM    ASTSGOT 28 04/11/2022 10:46 AM    ALTSGPT 38 04/11/2022 10:46 AM    TBILIRUBIN 0.5 04/11/2022 10:46 AM    ALBUMIN 3.4 04/11/2022 10:46 AM    TOTPROTEIN 6.3 (L) 04/11/2022 10:46 AM    AGRATIO 1.2 04/11/2022 10:46 AM       Lab Results   Component Value Date/Time    CHOLSTRLTOT 138 10/16/2019 0825    TRIGLYCERIDE 35 10/16/2019 0825    HDL 65.0 (H) 10/16/2019 0825    LDL 66 10/16/2019 0825    CHOLHDLRAT 2.12 10/16/2019 0825    NONHDL 73 10/16/2019 0825       Lab Results "   Component Value Date/Time    MALBCRT 7 04/11/2022 10:46 AM    MICROALBUR 4.9 04/11/2022 10:46 AM        Lab Results   Component Value Date/Time    TSHULTRASEN 2.10 04/17/2019 0757     No results found for: FREEDIR  No results found for: FREET3  No results found for: THYSTIMIG        ASSESSMENT/PLAN:     1. Controlled diabetes mellitus type 1 without complications (HCC)  A1c improved to 6.7%  Therapeutic CGM was downloaded and reviewed  Adjusted basal rate at 12 months 0.75 units/h  He is up-to-date with his labs  He is up-to-date with his eye exam  Follow-up with diabetes nurse in 3-month see me in 6 months      2. Acquired hypothyroidism  Controlled  Continue current levothyroxine dose 75 MCG daily  Repeat TSH in 3 months    3. Hashimoto's disease  This is the likely etiology of his hypothyroidism.    10% of individuals with Hashimoto's have negative antibodies    4. Dyslipidemia  Controlled  Continue atorvastatin  Repeat fasting lipids in 12 mos      5. Presence of insulin pump  Insulin pump is in place    6. Encounter for long-term (current) use of insulin (HCC)  Patient is on long-term insulin therapy for type 1 diabetes      Return in about 3 months (around 10/15/2022).      Thank you kindly for allowing me to participate in the diabetes care plan for this patient.    Favio Bowens MD, FACE, [facility]  03/13/20    CC:   Calos Ingram M.D.

## 2022-09-06 ENCOUNTER — TELEPHONE (OUTPATIENT)
Dept: ENDOCRINOLOGY | Facility: MEDICAL CENTER | Age: 67
End: 2022-09-06
Payer: MEDICARE

## 2022-09-06 NOTE — TELEPHONE ENCOUNTER
VOICEMAIL  1. Caller Name: Amaury Garner                        Call Back Number: [phone] (home)      2. Message: Patient called and left a message stating he needed help with his [facility] medical. I have faxed over the paperwork last week for [facility] and again today to make sure patient gets his supplies.     3. Patient approves office to leave a detailed voicemail/MyChart message: yes

## 2022-10-18 ENCOUNTER — NON-PROVIDER VISIT (OUTPATIENT)
Dept: ENDOCRINOLOGY | Facility: MEDICAL CENTER | Age: 67
End: 2022-10-18
Attending: INTERNAL MEDICINE
Payer: MEDICARE

## 2022-10-18 VITALS
WEIGHT: 187 LBS | OXYGEN SATURATION: 97 % | BODY MASS INDEX: 27.7 KG/M2 | SYSTOLIC BLOOD PRESSURE: 112 MMHG | HEART RATE: 64 BPM | DIASTOLIC BLOOD PRESSURE: 70 MMHG | HEIGHT: 69 IN

## 2022-10-18 DIAGNOSIS — E10.65 UNCONTROLLED TYPE 1 DIABETES MELLITUS WITH HYPERGLYCEMIA (HCC): ICD-10-CM

## 2022-10-18 LAB
HBA1C MFR BLD: 6.3 % (ref 0–5.6)
INT CON NEG: ABNORMAL
INT CON POS: ABNORMAL

## 2022-10-18 PROCEDURE — 83036 HEMOGLOBIN GLYCOSYLATED A1C: CPT

## 2022-10-18 PROCEDURE — 99213 OFFICE O/P EST LOW 20 MIN: CPT | Performed by: INTERNAL MEDICINE

## 2022-10-18 ASSESSMENT — FIBROSIS 4 INDEX: FIB4 SCORE: 1.69

## 2022-10-18 NOTE — PROGRESS NOTES
RN-CDE Note    Subjective:   Endocrinology Clinic Progress Note  PCP: Calos Ingram M.D.    HPI:  Amaury Garner is a 67 y.o. old patient who is seen today by the Diabetes Nurse Specialist for review of Type 1 Diabetes and Hypothyroidism.  Recent changes in health: General  health good.  DM:   Last A1c:   Lab Results   Component Value Date/Time    HBA1C 6.3 (A) 10/18/2022 12:52 PM      Previous A1c was 6.4 on 7/15/22  A1C GOAL: < 7    Diabetes Medications:   Novolog in pump  Midnight 0.725 units/h  5 AM 0.900 units/h  8 AM 0.850 units/h  12 noon 0.750 units/h  6 PM 0.900 units/h     Insulin to carbohydrate ratio midnight 6.9  5:30 AM 6.9 g  5:30 AM 5.7  11 AM 9.0  4 PM 6.0     Sensitivity 35,     Target blood sugar   midnight 100-130,  5 PM ,  8 -130    Exercise: Walking 2 miles daily and a hour of strengthening exercises.  Diet: Carbohydrate counting.  He states while he was fishing he didn't eat as well and had a lot of fast food.  Patient's body mass index is 27.62 kg/m². Exercise and nutrition counseling were performed at this visit.    Glucose monitoring frequency: See Dexcom download    Hypoglycemic episodes: yes - with exercise  Last Retinal Exam: on file and up-to-date  Daily Foot Exam: Yes   Foot Exam:  Monofilament: done  Monofilament testing with a 10 gram force: sensation intact: intact bilaterally  Visual Inspection: Feet without maceration, ulcers, fissures.  Pedal pulses: intact bilaterally   Lab Results   Component Value Date/Time    MALBCRT 7 04/11/2022 10:46 AM    MICROALBUR 4.9 04/11/2022 10:46 AM        ACR Albumin/Creatinine Ratio goal <30     HTN:   Blood pressure goal <140/<80 .   Currently Rx ACE/ARB: Yes     Dyslipidemia:    Lab Results   Component Value Date/Time    CHOLSTRLTOT 122 01/03/2022 08:26 AM    LDL 55 01/03/2022 08:26 AM    HDL 60.0 01/03/2022 08:26 AM    TRIGLYCERIDE 34 01/03/2022 08:26 AM         Currently Rx Statin: No     He  reports that he has never smoked. He  has never used smokeless tobacco.      Plan:     Discussed and educated on:   - All medications, side effects and compliance (discussed carefully)  - Annual eye examinations at Ophthalmology  - Home glucose monitoring emphasized  - Weight control and daily exercise    Recommended medication changes: No changes at this time.  Follow up in 3 months for his Medicare requirement for his pump.  He is interested in the [employer] pump when his warranty is up.    This chart and visit were discussed at length with Dr. Favio Bowens and any changed to treatment were authorized by Dr. Bowens.

## 2022-10-20 PROBLEM — N40.0 PROSTATISM: Status: RESOLVED | Noted: 2017-02-08 | Resolved: 2022-10-20

## 2023-02-03 ENCOUNTER — OFFICE VISIT (OUTPATIENT)
Dept: ENDOCRINOLOGY | Facility: MEDICAL CENTER | Age: 68
End: 2023-02-03
Attending: INTERNAL MEDICINE
Payer: MEDICARE

## 2023-02-03 VITALS
DIASTOLIC BLOOD PRESSURE: 60 MMHG | OXYGEN SATURATION: 94 % | HEART RATE: 68 BPM | SYSTOLIC BLOOD PRESSURE: 106 MMHG | WEIGHT: 188 LBS | BODY MASS INDEX: 27.85 KG/M2 | HEIGHT: 69 IN

## 2023-02-03 DIAGNOSIS — Z79.4 LONG-TERM INSULIN USE (HCC): ICD-10-CM

## 2023-02-03 DIAGNOSIS — E10.9 CONTROLLED DIABETES MELLITUS TYPE 1 WITHOUT COMPLICATIONS (HCC): ICD-10-CM

## 2023-02-03 DIAGNOSIS — E55.9 VITAMIN D DEFICIENCY: ICD-10-CM

## 2023-02-03 DIAGNOSIS — E53.8 VITAMIN B12 DEFICIENCY: ICD-10-CM

## 2023-02-03 DIAGNOSIS — Z96.41 PRESENCE OF INSULIN PUMP: ICD-10-CM

## 2023-02-03 DIAGNOSIS — E03.9 ACQUIRED HYPOTHYROIDISM: ICD-10-CM

## 2023-02-03 DIAGNOSIS — E78.5 DYSLIPIDEMIA: ICD-10-CM

## 2023-02-03 LAB
HBA1C MFR BLD: 6.3 % (ref ?–5.8)
POCT INT CON NEG: NEGATIVE
POCT INT CON POS: POSITIVE

## 2023-02-03 PROCEDURE — 83036 HEMOGLOBIN GLYCOSYLATED A1C: CPT | Performed by: INTERNAL MEDICINE

## 2023-02-03 PROCEDURE — 99214 OFFICE O/P EST MOD 30 MIN: CPT | Performed by: INTERNAL MEDICINE

## 2023-02-03 PROCEDURE — 95251 CONT GLUC MNTR ANALYSIS I&R: CPT | Performed by: INTERNAL MEDICINE

## 2023-02-03 PROCEDURE — 99213 OFFICE O/P EST LOW 20 MIN: CPT | Performed by: INTERNAL MEDICINE

## 2023-02-03 ASSESSMENT — PATIENT HEALTH QUESTIONNAIRE - PHQ9: CLINICAL INTERPRETATION OF PHQ2 SCORE: 0

## 2023-02-03 ASSESSMENT — FIBROSIS 4 INDEX: FIB4 SCORE: 1.69

## 2023-02-03 NOTE — PROGRESS NOTES
CHIEF COMPLAINT: Patient is here for follow up of Type 1 Diabetes Mellitus    HPI:     Amaury Garner is a 67 y.o. male with Type 1 Diabetes Mellitus here for follow up.      Labs from 2/3/2023 show a1c  is 6.3%  Labs from 7/15/2022 show a1c was 6.4%  Labs from 4/20/2021 show a1c was 6.6%  Labs from  10/19/2021 show a1c was 6.7%  Labs from 3/15/2021 show A1c was 7.0%  Labs from 10/6/2020 show a1c was 7.1%  Labs from 9/15/2020 show a1c was 6.7%  Labs from 6/15/2020 show a1c was 7.5%      He was previously followed by LORENZO Salinas  He has a history of primary hypothyroidism, and coronary artery disease aside from type 1 diabetes.  He is on insulin pump therapy with a [employer]itronic 670 G pump.  His insurance does not cover the Guardian sensor thus he is using a Dexcom G6 CGM.      Current pump settings are as follows:    Midnight 0.725 units/h  5 AM 0.900 units/h  8 AM 0.850 units/h  12 noon 0.750  6 PM 0.900 units/h    Insulin to carbohydrate ratio midnight 6.9  5:30 AM 6.9 g  5:30 AM 5.7  11 AM 9.5  4 PM 6.0    Sensitivity 35,    Target blood sugar   midnight 100-130,  5 PM ,  8 -130          Download of his CGM today shows average sugar is 130 (139 172 154 156)  with time in range of 82% (78% 55% 70% 69%) which is stable    He has hyperlipidemia and is taking Lipitor 40 mg daily plus Zetia 10mg daily    He is tolerating both very well.   His last LDL cholesterol was well controlled at 55 on Jan 2022      He does not have diabetic kidney disease or microalbuminuria  UACR was < 30 on 4/11/2022      He had an eye exam on January 2023  And we have records he does not have severe diabetic retinopathy      He has hypothyroidism and is taking Levothyroxine 75 MCG daily.  His last TSH was normal at 2.24 on Jan 2022  He denies  palpitations, tremors, cold intolerance and heat intolerance.          BG Diary:  Please see Dexcom G6 CGM download    Weight has been stable    Diabetes Complications   Retinopathy: Known  retinopathy.  Last eye exam: 1/13/2023 ([facility])  Neuropathy: Denies paresthesias or numbness in hands or feet. Denies any foot wounds.  Exercise: Minimal.  Diet: Fair.  Patient's medications, allergies, and social histories were reviewed and updated as appropriate.    ROS:     CONS:     No fever, no chills   EYES:     No diplopia, no blurry vision   CV:           No chest pain, no palpitations   PULM:     No SOB, no cough, no hemoptysis.   GI:            No nausea, no vomiting, no diarrhea, no constipation   ENDO:     No polyuria, no polydipsia, no heat intolerance, no cold intolerance       Past Medical History:  Problem List:  2021-04: Eczema  2021-04: History of basal cell cancer  2021-04: Xerosis of skin  2020-06: Hashimoto's disease  2019-09: Primary osteoarthritis of right knee  2019-09: Synovial cyst of right knee  2017-08: Non-seasonal allergic rhinitis due to pollen  2017-05: History of colonic polyps  2017-02: Coronary artery disease due to lipid rich plaque - s/p PCI   to LAD with recurrent PCI for in stent restenosis   2017-02: Long-term insulin use (HCC)  2017-02: Fitting and adjustment of insulin pump  2017-02: Presence of insulin pump  2017-02: DM type 1 with diabetic mixed hyperlipidemia ([facility])  2017-02: Hypothyroidism  2017-02: Controlled diabetes mellitus type 1 without complications   ([facility])  2017-02: Prostatism  2017-02: Insulin pump in place  2017-02: Stent in LAD - 5/2003 with subsequent cutting balloon   angioplasty 2004 repeat angio 2008 in setting of abnormal stress   showed patent stent  Dyslipidemia      Past Surgical History:  Past Surgical History:   Procedure Laterality Date    [place] COLONOSCOPY,DIAGNOSTIC N/A 3/17/2020    Procedure: COLONOSCOPY;  Surgeon: Julius Batista M.D.;  [facility];  Service: Gen Robotic        Allergies:  Patient has no known allergies.     Social History:  Social History     Tobacco Use    Smoking status: Never    Smokeless tobacco:  "Never   Vaping Use    Vaping Use: Never used   Substance Use Topics    Alcohol use: Yes     Comment: 1 glass of scotch nightly    Drug use: No        Family History:   family history includes Alzheimer's Disease in his mother; Cancer in his father.      PHYSICAL EXAM:   OBJECTIVE:  Vital signs: /60   Pulse 68   Ht 1.753 m (5' 9\")   Wt 85.3 kg (188 lb)   SpO2 94%   BMI 27.76 kg/m²   GENERAL: Well-developed, well-nourished in no apparent distress.   EYE:  No ocular asymmetry, PERRLA  HENT: Pink, moist mucous membranes.    NECK: No thyromegaly.   CARDIOVASCULAR:  No murmurs  LUNGS: Clear breath sounds  ABDOMEN: Soft, nontender   EXTREMITIES: No clubbing, cyanosis, or edema.   NEUROLOGICAL: No gross focal motor abnormalities   LYMPH: No cervical adenopathy palpated.   SKIN: No rashes, lesions.       Labs:  Lab Results   Component Value Date/Time    HBA1C 6.3 (A) 02/03/2023 08:46 AM        Lab Results   Component Value Date/Time    WBC 5.9 06/08/2021 08:20 AM    RBC 4.53 (L) 06/08/2021 08:20 AM    HEMOGLOBIN 14.4 06/08/2021 08:20 AM    MCV 94.3 (H) 06/08/2021 08:20 AM    MCH 31.8 (H) 06/08/2021 08:20 AM    MCHC 33.7 06/08/2021 08:20 AM    RDW 13.1 06/08/2021 08:20 AM    MPV 10.0 06/08/2021 08:20 AM       Lab Results   Component Value Date/Time    SODIUM 141 10/12/2022 11:28 AM    POTASSIUM 4.3 10/12/2022 11:28 AM    CHLORIDE 107 10/12/2022 11:28 AM    CO2 25 10/12/2022 11:28 AM    ANION 13 10/12/2022 11:28 AM    GLUCOSE 126 (H) 10/12/2022 11:28 AM    BUN 28 (H) 10/12/2022 11:28 AM    CREATININE 1.1 10/12/2022 11:28 AM    CALCIUM 8.6 10/12/2022 11:28 AM    ASTSGOT 27 10/12/2022 11:28 AM    ALTSGPT 32 10/12/2022 11:28 AM    TBILIRUBIN 0.4 10/12/2022 11:28 AM    ALBUMIN 3.4 10/12/2022 11:28 AM    TOTPROTEIN 6.4 10/12/2022 11:28 AM    AGRATIO 1.1 10/12/2022 11:28 AM       Lab Results   Component Value Date/Time    CHOLSTRLTOT 138 10/16/2019 0825    TRIGLYCERIDE 35 10/16/2019 0825    HDL 65.0 (H) 10/16/2019 0825    " LDL 66 10/16/2019 0825    CHOLHDLRAT 2.12 10/16/2019 0825    NONHDL 73 10/16/2019 0825       Lab Results   Component Value Date/Time    MALBCRT 7 04/11/2022 10:46 AM    MICROALBUR 4.9 04/11/2022 10:46 AM        Lab Results   Component Value Date/Time    TSHULTRASEN 2.10 04/17/2019 0757     No results found for: FREEDIR  No results found for: FREET3  No results found for: THYSTIMIG        ASSESSMENT/PLAN:     1. Controlled diabetes mellitus type 1 without complications (HCC)  A1c improved to 6.3%  Therapeutic CGM was downloaded and reviewed  I did not make any changes to his pump settings  He is up-to-date with his labs  He is up-to-date with his eye exam  Follow-up with the office in 3 months with labs      2. Acquired hypothyroidism  Controlled  Continue current levothyroxine dose 75 MCG daily  Repeat TSH in 3 months    3. Hashimoto's disease  This is the likely etiology of his hypothyroidism.    10% of individuals with Hashimoto's have negative antibodies    4. Dyslipidemia  Controlled  Continue atorvastatin  Repeat fasting lipids in 3 months      5. Presence of insulin pump  Insulin pump is in place    6. Encounter for long-term (current) use of insulin (HCC)  Patient is on long-term insulin therapy for type 1 diabetes      Return in about 3 months (around 5/3/2023).      Thank you kindly for allowing me to participate in the diabetes care plan for this patient.    Favio Bowens MD, FACE, [facility]    CC:   Calos Ingram M.D.

## 2023-02-03 NOTE — PROGRESS NOTES
RN-CDE Note    Subjective:   Endocrinology Clinic Progress Note  PCP: Calos Ingram M.D.    HPI:  Amaury Garner is a 67 y.o. old patient who is seen today by the Diabetes Nurse Specialist for review of type 1 diabetes on insulin pump.  Recent changes in health: Health good.  DM:   Last A1c:   Lab Results   Component Value Date/Time    HBA1C 6.3 (A) 02/03/2023 08:46 AM      Previous A1c was 6.3 on 10/18/22  A1C GOAL: < 7    Diabetes Medications:   Novolog in pump      Exercise: Walking and exercises daily  Diet: carbohydrate counting  Patient's body mass index is unknown because there is no height or weight on file. Exercise and nutrition counseling were performed at this visit.    Glucose monitoring frequency: see dexcom download    Hypoglycemic episodes: yes - with exercise  Last Retinal Exam: on file and up-to-date  Daily Foot Exam: Yes   Foot Exam:  Monofilament: done  Monofilament testing with a 10 gram force: sensation intact: intact bilaterally  Visual Inspection: Feet without maceration, ulcers, fissures.  Pedal pulses: intact bilaterally   Lab Results   Component Value Date/Time    MALBCRT 7 04/11/2022 10:46 AM    MICROALBUR 4.9 04/11/2022 10:46 AM        ACR Albumin/Creatinine Ratio goal <30     HTN:   Blood pressure goal <130/<80 .   Currently Rx ACE/ARB: Yes     Dyslipidemia:    Lab Results   Component Value Date/Time    CHOLSTRLTOT 122 01/03/2022 08:26 AM    LDL 55 01/03/2022 08:26 AM    HDL 60.0 01/03/2022 08:26 AM    TRIGLYCERIDE 34 01/03/2022 08:26 AM         Currently Rx Statin: Yes     He  reports that he has never smoked. He has never used smokeless tobacco.      Plan:     Discussed and educated on:   - All medications, side effects and compliance (discussed carefully)  - Annual eye examinations at Ophthalmology  - Home glucose monitoring emphasized  - Weight control and daily exercise    Recommended medication changes: Adjust pump as needed.     [Well Developed] : well developed [Well Nourished] : well nourished [No Acute Distress] : no acute distress [Normal Conjunctiva] : normal conjunctiva [Normal Venous Pressure] : normal venous pressure [Carotid Bruit] : carotid bruit [Normal S1, S2] : normal S1, S2 [No Rub] : no rub [No Gallop] : no gallop [Murmur] : murmur [Clear Lung Fields] : clear lung fields [Good Air Entry] : good air entry [No Respiratory Distress] : no respiratory distress  [Soft] : abdomen soft [Non Tender] : non-tender [No Masses/organomegaly] : no masses/organomegaly [Normal Bowel Sounds] : normal bowel sounds [No Edema] : no edema [No Cyanosis] : no cyanosis [No Clubbing] : no clubbing [No Varicosities] : no varicosities [No Rash] : no rash [No Skin Lesions] : no skin lesions [Moves all extremities] : moves all extremities [No Focal Deficits] : no focal deficits [Normal Speech] : normal speech [Alert and Oriented] : alert and oriented [Normal memory] : normal memory [de-identified] : L [de-identified] : 2/6 SUNNY --> Axilla [de-identified] : Wheelchair bound

## 2023-05-08 ENCOUNTER — OFFICE VISIT (OUTPATIENT)
Dept: ENDOCRINOLOGY | Facility: MEDICAL CENTER | Age: 68
End: 2023-05-08
Attending: INTERNAL MEDICINE
Payer: MEDICARE

## 2023-05-08 VITALS
OXYGEN SATURATION: 100 % | BODY MASS INDEX: 27.95 KG/M2 | HEART RATE: 83 BPM | WEIGHT: 188.7 LBS | SYSTOLIC BLOOD PRESSURE: 120 MMHG | DIASTOLIC BLOOD PRESSURE: 70 MMHG | HEIGHT: 69 IN

## 2023-05-08 DIAGNOSIS — E55.9 VITAMIN D DEFICIENCY: ICD-10-CM

## 2023-05-08 DIAGNOSIS — E10.9 CONTROLLED DIABETES MELLITUS TYPE 1 WITHOUT COMPLICATIONS (HCC): ICD-10-CM

## 2023-05-08 DIAGNOSIS — E03.9 ACQUIRED HYPOTHYROIDISM: ICD-10-CM

## 2023-05-08 DIAGNOSIS — Z96.41 PRESENCE OF INSULIN PUMP: ICD-10-CM

## 2023-05-08 DIAGNOSIS — Z79.4 LONG-TERM INSULIN USE (HCC): ICD-10-CM

## 2023-05-08 DIAGNOSIS — E78.5 DYSLIPIDEMIA: ICD-10-CM

## 2023-05-08 LAB
HBA1C MFR BLD: 6.1 % (ref ?–5.8)
POCT INT CON NEG: NEGATIVE
POCT INT CON POS: POSITIVE

## 2023-05-08 PROCEDURE — 95251 CONT GLUC MNTR ANALYSIS I&R: CPT | Performed by: INTERNAL MEDICINE

## 2023-05-08 PROCEDURE — 99214 OFFICE O/P EST MOD 30 MIN: CPT | Performed by: INTERNAL MEDICINE

## 2023-05-08 PROCEDURE — 99213 OFFICE O/P EST LOW 20 MIN: CPT | Performed by: INTERNAL MEDICINE

## 2023-05-08 PROCEDURE — 83036 HEMOGLOBIN GLYCOSYLATED A1C: CPT | Performed by: INTERNAL MEDICINE

## 2023-05-08 RX ORDER — INSULIN ASPART 100 [IU]/ML
INJECTION, SOLUTION INTRAVENOUS; SUBCUTANEOUS
COMMUNITY
Start: 2023-03-14 | End: 2023-05-08

## 2023-05-08 ASSESSMENT — FIBROSIS 4 INDEX: FIB4 SCORE: 1.45

## 2023-05-08 NOTE — PROGRESS NOTES
CHIEF COMPLAINT: Patient is here for follow up of Type 1 Diabetes Mellitus    HPI:     Amaury Garner is a 67 y.o. male with Type 1 Diabetes Mellitus here for follow up.      Labs from 5/`/2023 show a1c  is 6.1%  Labs from 2/3/2023 show a1c  is 6.3%  Labs from 7/15/2022 show a1c was 6.4%  Labs from 4/20/2021 show a1c was 6.6%  Labs from 10/19/2021 show a1c was 6.7%  Labs from 3/15/2021 show A1c was 7.0%  Labs from 10/6/2020 show a1c was 7.1%  Labs from 9/15/2020 show a1c was 6.7%  Labs from 6/15/2020 show a1c was 7.5%      He was previously followed by LORENZO Salinas  He has a history of primary hypothyroidism, and coronary artery disease aside from type 1 diabetes.  He is on insulin pump therapy with a Medtronic 670 G pump.  His insurance does not cover the Guardian sensor thus he is using a Dexcom G6 CGM.      His pump is infusing Novolog  (2 vials per month)  Current pump settings are as follows:    Midnight 0.725 units/h  5 AM 0.900 units/h  8 AM 0.850 units/h  12 noon 0.750  6 PM 0.900 units/h    Insulin to carbohydrate ratio midnight 6.9  5:30 AM 6.9 g  5:30 AM 5.7  11 AM 9.5  4 PM 6.0    Sensitivity 35,    Target blood sugar   midnight 100-130,  5 PM ,  8 -130      His pump is out of warranty on June 2019        Download of his CGM today shows average sugar is 129 (130 139 172 154 156)  with time in range of 83% (82% 78% 55% 70% 69%)   AGP review shows minimal highs      He has hyperlipidemia and is taking Lipitor 40 mg daily plus Zetia 10mg daily    He is tolerating both very well.   His last LDL cholesterol was well controlled at 97 on 5/2023      He does not have diabetic kidney disease or microalbuminuria  UACR was < 30 on  5/2023      He had an eye exam on January 2023  And we have records he does not have severe diabetic retinopathy      He has hypothyroidism and is taking Levothyroxine 75 MCG daily.    He denies  palpitations, tremors, cold intolerance and heat intolerance.  His TSH is 1.9  on 5/2023  His TSH was 2.24 on Jan 2022        BG Diary:  Please see Dexcom G6 CGM download    Weight has been stable    Diabetes Complications   Retinopathy: Known retinopathy.  Last eye exam: 1/13/2023 ([facility])  Neuropathy: Denies paresthesias or numbness in hands or feet. Denies any foot wounds.  Exercise: Minimal.  Diet: Fair.  Patient's medications, allergies, and social histories were reviewed and updated as appropriate.    ROS:     CONS:     No fever, no chills   EYES:     No diplopia, no blurry vision   CV:           No chest pain, no palpitations   PULM:     No SOB, no cough, no hemoptysis.   GI:            No nausea, no vomiting, no diarrhea, no constipation   ENDO:     No polyuria, no polydipsia, no heat intolerance, no cold intolerance       Past Medical History:  Problem List:  2021-04: Eczema  2021-04: History of basal cell cancer  2021-04: Xerosis of skin  2020-06: Hashimoto's disease  2019-09: Primary osteoarthritis of right knee  2019-09: Synovial cyst of right knee  2017-08: Non-seasonal allergic rhinitis due to pollen  2017-05: History of colonic polyps  2017-02: Coronary artery disease due to lipid rich plaque - s/p PCI   to LAD with recurrent PCI for in stent restenosis   2017-02: Long-term insulin use (HCC)  2017-02: Fitting and adjustment of insulin pump  2017-02: Presence of insulin pump  2017-02: DM type 1 with diabetic mixed hyperlipidemia (HCC)  2017-02: Hypothyroidism  2017-02: Controlled diabetes mellitus type 1 without complications   (HCC)  2017-02: Prostatism  2017-02: Insulin pump in place  2017-02: Stent in LAD - 5/2003 with subsequent cutting balloon   angioplasty 2004 repeat angio 2008 in setting of abnormal stress   showed patent stent  Dyslipidemia      Past Surgical History:  Past Surgical History:   Procedure Laterality Date    [place] COLONOSCOPY,DIAGNOSTIC N/A 3/17/2020    Procedure: COLONOSCOPY;  Surgeon: Julius Batista M.D.;  Location: SURGERY [facility];  Service:  "Gen Robotic        Allergies:  Patient has no known allergies.     Social History:  Social History     Tobacco Use    Smoking status: Never    Smokeless tobacco: Never   Vaping Use    Vaping Use: Never used   Substance Use Topics    Alcohol use: Yes     Comment: 1 glass of scotch nightly    Drug use: No        Family History:   family history includes Alzheimer's Disease in his mother; Cancer in his father.      PHYSICAL EXAM:   OBJECTIVE:  Vital signs: /70   Pulse 83   Ht 1.753 m (5' 9\")   Wt 85.6 kg (188 lb 11.2 oz)   SpO2 100%   BMI 27.87 kg/m²   GENERAL: Well-developed, well-nourished in no apparent distress.   EYE:  No ocular asymmetry, PERRLA  HENT: Pink, moist mucous membranes.    NECK: No thyromegaly.   CARDIOVASCULAR:  No murmurs  LUNGS: Clear breath sounds  ABDOMEN: Soft, nontender   EXTREMITIES: No clubbing, cyanosis, or edema.   NEUROLOGICAL: No gross focal motor abnormalities   LYMPH: No cervical adenopathy palpated.   SKIN: No rashes, lesions.       Labs:  Lab Results   Component Value Date/Time    HBA1C 6.1 (A) 05/08/2023 11:31 AM        Lab Results   Component Value Date/Time    WBC 5.9 06/08/2021 08:20 AM    RBC 4.53 (L) 06/08/2021 08:20 AM    HEMOGLOBIN 14.4 06/08/2021 08:20 AM    MCV 94.3 (H) 06/08/2021 08:20 AM    MCH 31.8 (H) 06/08/2021 08:20 AM    MCHC 33.7 06/08/2021 08:20 AM    RDW 13.1 06/08/2021 08:20 AM    MPV 10.0 06/08/2021 08:20 AM       Lab Results   Component Value Date/Time    SODIUM 137 05/01/2023 07:25 AM    POTASSIUM 4.5 05/01/2023 07:25 AM    CHLORIDE 102 05/01/2023 07:25 AM    CO2 27 05/01/2023 07:25 AM    ANION 13 05/01/2023 07:25 AM    GLUCOSE 120 (H) 05/01/2023 07:25 AM    BUN 26 (H) 05/01/2023 07:25 AM    CREATININE 1.2 05/01/2023 07:25 AM    CALCIUM 8.8 05/01/2023 07:25 AM    ASTSGOT 22 05/01/2023 07:25 AM    ALTSGPT 29 05/01/2023 07:25 AM    TBILIRUBIN 0.6 05/01/2023 07:25 AM    ALBUMIN 3.6 05/01/2023 07:25 AM    TOTPROTEIN 7.2 05/01/2023 07:25 AM    AGRATIO 1.0 " 05/01/2023 07:25 AM       Lab Results   Component Value Date/Time    CHOLSTRLTOT 138 10/16/2019 0825    TRIGLYCERIDE 35 10/16/2019 0825    HDL 65.0 (H) 10/16/2019 0825    LDL 66 10/16/2019 0825    CHOLHDLRAT 2.12 10/16/2019 0825    NONHDL 73 10/16/2019 0825       Lab Results   Component Value Date/Time    MALBCRT 5 05/01/2023 07:25 AM    MICROALBUR 5.0 05/01/2023 07:25 AM        Lab Results   Component Value Date/Time    TSHULTRASEN 2.10 04/17/2019 0757     No results found for: FREEDIR  No results found for: FREET3  No results found for: THYSTIMIG        ASSESSMENT/PLAN:     1. Controlled diabetes mellitus type 1 without complications (HCC)  A1c improved to 6.1%  Therapeutic CGM was downloaded and reviewed  I did not make any changes to his pump settings  He is up-to-date with his labs  He is up-to-date with his eye exam  He is going to call me on 6/19/2023 so we can order the tandem pump to replace medtronic  Follow-up with the office in 3 months with labs      2. Acquired hypothyroidism  Controlled  Continue current levothyroxine dose 75 MCG daily  Repeat TSH in 6 months    3. Hashimoto's disease  This is the likely etiology of his hypothyroidism.    10% of individuals with Hashimoto's have negative antibodies    4. Dyslipidemia  Controlled  Continue atorvastatin  Repeat fasting lipids in 12 months      5. Presence of insulin pump  Insulin pump is in place    6. Encounter for long-term (current) use of insulin (HCC)  Patient is on long-term insulin therapy for type 1 diabetes      Return in about 3 months (around 8/8/2023).      Thank you kindly for allowing me to participate in the diabetes care plan for this patient.    Favio Bowens MD, [facility], [facility]    CC:   Calos Ingram M.D.

## 2023-06-19 NOTE — TELEPHONE ENCOUNTER
Phone Number Called: [phone] (home)       Message: lm for patient to clarify which [facility] like us to send medication to.    Left Message for patient to call back: yes       4 = No assist / stand by assistance

## 2023-07-07 DIAGNOSIS — Z96.41 PRESENCE OF INSULIN PUMP: ICD-10-CM

## 2023-07-10 DIAGNOSIS — Z96.41 PRESENCE OF INSULIN PUMP: ICD-10-CM

## 2023-07-10 RX ORDER — INSULIN ASPART 100 [IU]/ML
INJECTION, SOLUTION INTRAVENOUS; SUBCUTANEOUS
Qty: 50 ML | Refills: 2 | Status: SHIPPED | OUTPATIENT
Start: 2023-07-10 | End: 2023-11-21 | Stop reason: SDUPTHER

## 2023-07-10 RX ORDER — INSULIN ASPART 100 [IU]/ML
INJECTION, SOLUTION INTRAVENOUS; SUBCUTANEOUS
Qty: 50 ML | Refills: 1 | Status: SHIPPED | OUTPATIENT
Start: 2023-07-10 | End: 2023-11-21 | Stop reason: SDUPTHER

## 2023-08-24 ENCOUNTER — OFFICE VISIT (OUTPATIENT)
Dept: ENDOCRINOLOGY | Facility: MEDICAL CENTER | Age: 68
End: 2023-08-24
Attending: INTERNAL MEDICINE
Payer: MEDICARE

## 2023-08-24 VITALS
HEIGHT: 69 IN | WEIGHT: 185 LBS | OXYGEN SATURATION: 95 % | BODY MASS INDEX: 27.4 KG/M2 | SYSTOLIC BLOOD PRESSURE: 118 MMHG | HEART RATE: 83 BPM | DIASTOLIC BLOOD PRESSURE: 60 MMHG

## 2023-08-24 DIAGNOSIS — Z79.4 LONG-TERM INSULIN USE (HCC): ICD-10-CM

## 2023-08-24 DIAGNOSIS — E03.9 ACQUIRED HYPOTHYROIDISM: ICD-10-CM

## 2023-08-24 DIAGNOSIS — E55.9 VITAMIN D DEFICIENCY: ICD-10-CM

## 2023-08-24 DIAGNOSIS — E10.9 CONTROLLED DIABETES MELLITUS TYPE 1 WITHOUT COMPLICATIONS (HCC): ICD-10-CM

## 2023-08-24 DIAGNOSIS — E06.3 HASHIMOTO'S THYROIDITIS: ICD-10-CM

## 2023-08-24 DIAGNOSIS — Z96.41 PRESENCE OF INSULIN PUMP: ICD-10-CM

## 2023-08-24 DIAGNOSIS — E78.5 DYSLIPIDEMIA: ICD-10-CM

## 2023-08-24 LAB
HBA1C MFR BLD: 6 % (ref ?–5.8)
POCT INT CON NEG: NEGATIVE
POCT INT CON POS: POSITIVE

## 2023-08-24 PROCEDURE — 95251 CONT GLUC MNTR ANALYSIS I&R: CPT | Performed by: INTERNAL MEDICINE

## 2023-08-24 PROCEDURE — 3074F SYST BP LT 130 MM HG: CPT | Performed by: INTERNAL MEDICINE

## 2023-08-24 PROCEDURE — 3078F DIAST BP <80 MM HG: CPT | Performed by: INTERNAL MEDICINE

## 2023-08-24 PROCEDURE — 83036 HEMOGLOBIN GLYCOSYLATED A1C: CPT | Performed by: INTERNAL MEDICINE

## 2023-08-24 PROCEDURE — 99213 OFFICE O/P EST LOW 20 MIN: CPT | Performed by: INTERNAL MEDICINE

## 2023-08-24 PROCEDURE — 99214 OFFICE O/P EST MOD 30 MIN: CPT | Performed by: INTERNAL MEDICINE

## 2023-08-24 NOTE — PROGRESS NOTES
CHIEF COMPLAINT: Patient is here for follow up of Type 1 Diabetes Mellitus    HPI:     Amaury Garner is a 68 y.o. male with Type 1 Diabetes Mellitus here for follow up.      Labs from 8/24/2023 show a1c is 6.0%  Labs from 5/8/2023 show a1c was 6.1%  Labs from 2/3/2023 show a1c  was 6.3%  Labs from 7/15/2022 show a1c was 6.4%  Labs from 4/20/2021 show a1c was 6.6%  Labs from 10/19/2021 show a1c was 6.7%  Labs from 3/15/2021 show A1c was 7.0%  Labs from 10/6/2020 show a1c was 7.1%  Labs from 9/15/2020 show a1c was 6.7%  Labs from 6/15/2020 show a1c was 7.5%      He was previously followed by LORENZO Salinas  He has a history of primary hypothyroidism, and coronary artery disease aside from type 1 diabetes.  He is on insulin pump therapy with a [employer]str[employer] 670 G pump.  His insurance does not cover the Guardian sensor thus he is using a Dexcom G6 CGM.      His pump is infusing Novolog  (2 vials per month)  Current pump settings are as follows:    Midnight 0.725 units/h  5 AM 0.900 units/h  8 AM 0.850 units/h  12 noon 0.750  6 PM 0.900 units/h    Insulin to carbohydrate ratio midnight 6.9  MN 6.9 g  5:30 AM 5.7  11 AM 9.5  4 PM 6.0    Sensitivity 35,    Target blood sugar   midnight 100-130,  5 AM ,  8 -130      His pump is out of warranty on June 2024 ( per medicare)   Today we talked about the Mobi     He an ingrown toenail on the right big toe but is working on podiatry there are no signs of injection   He changed sites for infusion sets resulting in better absorption       Download of his CGM today shows average sugar is 137 (129 130 139 172 154 156)  with time in range of  77% (83% b82% 78% 55% 70% 69%)   AGP review shows minimal highs and lows      He has hyperlipidemia and  He  is taking Lipitor 40 mg daily plus Zetia 10mg daily    He does have CAD  He is tolerating both very well.   His last LDL cholesterol was well controlled at 97 on 5/2023      He does not have diabetic kidney disease or  microalbuminuria  UACR was < 30 on  5/2023      He had an eye exam on January 2023  And we have records he does not have severe diabetic retinopathy      He has hypothyroidism and is taking Levothyroxine 75 MCG daily.    He denies  palpitations, tremors, cold intolerance and heat intolerance.  His TSH is 1.9 on 5/2023  His TSH was 2.24 on Jan 2022        BG Diary:  Please see Dexcom G6 CGM download    Weight has been stable    Diabetes Complications   Retinopathy: Known retinopathy.  Last eye exam: 1/13/2023 ([facility])  Neuropathy: Denies paresthesias or numbness in hands or feet. Denies any foot wounds.  Exercise: Minimal.  Diet: Fair.  Patient's medications, allergies, and social histories were reviewed and updated as appropriate.    ROS:     CONS:     No fever, no chills   EYES:     No diplopia, no blurry vision   CV:           No chest pain, no palpitations   PULM:     No SOB, no cough, no hemoptysis.   GI:            No nausea, no vomiting, no diarrhea, no constipation   ENDO:     No polyuria, no polydipsia, no heat intolerance, no cold intolerance       Past Medical History:  Problem List:  2021-04: Eczema  2021-04: History of basal cell cancer  2021-04: Xerosis of skin  2020-06: Hashimoto's disease  2019-09: Primary osteoarthritis of right knee  2019-09: Synovial cyst of right knee  2017-08: Non-seasonal allergic rhinitis due to pollen  2017-05: History of colonic polyps  2017-02: Coronary artery disease due to lipid rich plaque - s/p PCI   to LAD with recurrent PCI for in stent restenosis   2017-02: Long-term insulin use (HCC)  2017-02: Fitting and adjustment of insulin pump  2017-02: Presence of insulin pump  2017-02: DM type 1 with diabetic mixed hyperlipidemia (HCC)  2017-02: Hypothyroidism  2017-02: Controlled diabetes mellitus type 1 without complications   ([facility])  2017-02: Prostatism  2017-02: Insulin pump in place  2017-02: Stent in LAD - 5/2003 with subsequent cutting balloon   angioplasty 2004  "repeat angio 2008 in setting of abnormal stress   showed patent stent  Dyslipidemia      Past Surgical History:  Past Surgical History:   Procedure Laterality Date    [place] COLONOSCOPY,DIAGNOSTIC N/A 3/17/2020    Procedure: COLONOSCOPY;  Surgeon: Julius Batista M.D.;  Location: SURGERY [facility];  Service: Gen Robotic        Allergies:  Patient has no known allergies.     Social History:  Social History     Tobacco Use    Smoking status: Never    Smokeless tobacco: Never   Vaping Use    Vaping Use: Never used   Substance Use Topics    Alcohol use: Yes     Comment: 1 glass of scotch nightly    Drug use: No        Family History:   family history includes Alzheimer's Disease in his mother; Cancer in his father.      PHYSICAL EXAM:   OBJECTIVE:  Vital signs: /60 (BP Location: Left arm, Patient Position: Sitting, BP Cuff Size: Adult)   Pulse 83   Ht 1.753 m (5' 9\")   Wt 83.9 kg (185 lb)   SpO2 95%   BMI 27.32 kg/m²   GENERAL: Well-developed, well-nourished in no apparent distress.   EYE:  No ocular asymmetry, PERRLA  HENT: Pink, moist mucous membranes.    NECK: No thyromegaly.   CARDIOVASCULAR:  No murmurs  LUNGS: Clear breath sounds  ABDOMEN: Soft, nontender   EXTREMITIES: No clubbing, cyanosis, or edema.   NEUROLOGICAL: No gross focal motor abnormalities   LYMPH: No cervical adenopathy palpated.   SKIN: No rashes, lesions.       Labs:  Lab Results   Component Value Date/Time    HBA1C 6.1 (A) 05/08/2023 11:31 AM        Lab Results   Component Value Date/Time    WBC 5.9 06/08/2021 08:20 AM    RBC 4.53 (L) 06/08/2021 08:20 AM    HEMOGLOBIN 14.4 06/08/2021 08:20 AM    MCV 94.3 (H) 06/08/2021 08:20 AM    MCH 31.8 (H) 06/08/2021 08:20 AM    MCHC 33.7 06/08/2021 08:20 AM    RDW 13.1 06/08/2021 08:20 AM    MPV 10.0 06/08/2021 08:20 AM       Lab Results   Component Value Date/Time    SODIUM 137 05/01/2023 07:25 AM    POTASSIUM 4.5 05/01/2023 07:25 AM    CHLORIDE 102 05/01/2023 07:25 AM    CO2 27 05/01/2023 " 07:25 AM    ANION 13 05/01/2023 07:25 AM    GLUCOSE 120 (H) 05/01/2023 07:25 AM    BUN 26 (H) 05/01/2023 07:25 AM    CREATININE 1.2 05/01/2023 07:25 AM    CALCIUM 8.8 05/01/2023 07:25 AM    ASTSGOT 22 05/01/2023 07:25 AM    ALTSGPT 29 05/01/2023 07:25 AM    TBILIRUBIN 0.6 05/01/2023 07:25 AM    ALBUMIN 3.6 05/01/2023 07:25 AM    TOTPROTEIN 7.2 05/01/2023 07:25 AM    AGRATIO 1.0 05/01/2023 07:25 AM       Lab Results   Component Value Date/Time    CHOLSTRLTOT 138 10/16/2019 0825    TRIGLYCERIDE 35 10/16/2019 0825    HDL 65.0 (H) 10/16/2019 0825    LDL 66 10/16/2019 0825    CHOLHDLRAT 2.12 10/16/2019 0825    NONHDL 73 10/16/2019 0825       Lab Results   Component Value Date/Time    MALBCRT 5 05/01/2023 07:25 AM    MICROALBUR 5.0 05/01/2023 07:25 AM        Lab Results   Component Value Date/Time    TSHULTRASEN 2.10 04/17/2019 0757     No results found for: FREEDIR  No results found for: FREET3  No results found for: THYSTIMIG        ASSESSMENT/PLAN:     1. Controlled diabetes mellitus type 1 without complications ([facility])  A1c improved to 6.0%  Therapeutic CGM was downloaded and reviewed  I did not make any changes to his pump settings  Discussed Tandem [place] in the near future   He is up-to-date with his labs  He is up-to-date with his eye exam  Follow-up with the office in 3 months with labs for TSH  SEE Disposition       2. Acquired hypothyroidism  Controlled  Continue current levothyroxine dose 75 MCG daily  Repeat TSH in 3 months    3. Hashimoto's disease  This is the likely etiology of his hypothyroidism.    10% of individuals with Hashimoto's have negative antibodies    4. Dyslipidemia  Controlled  Continue atorvastatin  Repeat fasting lipids in 12 months      5. Presence of insulin pump  Insulin pump is in place    6. Encounter for long-term (current) use of insulin (HCC)  Patient is on long-term insulin therapy for type 1 diabetes      Disposition: see NP for 3 month follow up due to medicare patient needing closer  follow up to approve supplies       Return in about 3 months (around 11/24/2023).      Thank you kindly for allowing me to participate in the diabetes care plan for this patient.    Favio Bowens MD, [facility], [facility]    CC:   Calos Ingram M.D.

## 2023-11-21 ENCOUNTER — OFFICE VISIT (OUTPATIENT)
Dept: ENDOCRINOLOGY | Facility: MEDICAL CENTER | Age: 68
End: 2023-11-21
Payer: MEDICARE

## 2023-11-21 VITALS
BODY MASS INDEX: 27.56 KG/M2 | SYSTOLIC BLOOD PRESSURE: 114 MMHG | DIASTOLIC BLOOD PRESSURE: 60 MMHG | OXYGEN SATURATION: 96 % | HEART RATE: 78 BPM | HEIGHT: 69 IN | WEIGHT: 186.1 LBS

## 2023-11-21 DIAGNOSIS — Z96.41 PRESENCE OF INSULIN PUMP: ICD-10-CM

## 2023-11-21 DIAGNOSIS — E55.9 VITAMIN D DEFICIENCY: ICD-10-CM

## 2023-11-21 DIAGNOSIS — E78.5 DYSLIPIDEMIA: ICD-10-CM

## 2023-11-21 DIAGNOSIS — E10.9 CONTROLLED DIABETES MELLITUS TYPE 1 WITHOUT COMPLICATIONS (HCC): ICD-10-CM

## 2023-11-21 DIAGNOSIS — E03.9 ACQUIRED HYPOTHYROIDISM: ICD-10-CM

## 2023-11-21 PROCEDURE — 99214 OFFICE O/P EST MOD 30 MIN: CPT

## 2023-11-21 PROCEDURE — 3074F SYST BP LT 130 MM HG: CPT

## 2023-11-21 PROCEDURE — 3078F DIAST BP <80 MM HG: CPT

## 2023-11-21 RX ORDER — INSULIN ASPART 100 [IU]/ML
INJECTION, SOLUTION INTRAVENOUS; SUBCUTANEOUS
Qty: 50 ML | Refills: 3 | Status: SHIPPED | OUTPATIENT
Start: 2023-11-21

## 2023-11-21 ASSESSMENT — FIBROSIS 4 INDEX: FIB4 SCORE: 1.32

## 2023-11-21 NOTE — PROGRESS NOTES
CHIEF COMPLAINT: Patient is here for follow up of Type 1 Diabetes Mellitus    HPI:     Amaury Garner is a 68 y.o. male with Type 1 Diabetes Mellitus here for follow up.  He is feeling good today    Diabetes Type 1  Labs from 8/24/2023 show A1c was 6.0%  Labs from 5/8/2023 show a1c was 6.1%  Labs from 2/3/2023 show a1c  was 6.3%  Labs from 7/15/2022 show a1c was 6.4%    He was previously followed by LORENZO Salinas  He has a history of primary hypothyroidism, and coronary artery disease aside from type 1 diabetes.  He is on insulin pump therapy with a [email or web address].[email or web address] 670 G pump.  His insurance does not cover the Guardian sensor thus he is using a Dexcom G6 CGM.      His pump is infusing Novolog  (2 vials per month)  Current pump settings are as follows:    Midnight 0.725 units/h  5 AM 0.900 units/h  8 AM 0.850 units/h  12 noon 0.750  6 PM 0.900 units/h    Insulin to carbohydrate ratio midnight 6.9  MN 6.9 g  5:30 AM 5.7  11 AM 9.5  4 PM 6.0    Sensitivity 35,    Target blood sugar   midnight 100-130,  5 AM ,  8 -130      His pump is out of warranty on June 2024 ( per medicare)   Today we talked about the Mobi     He an ingrown toenail on the right big toe but is working on podiatry there are no signs of injection   He changed sites for infusion sets resulting in better absorption       AGP review shows minimal highs and lows      Hyperlipidemia  He  is taking Lipitor 40 mg daily   Stopped taking zetia a year ago  He does have CAD  He is tolerating both very well.    Latest Reference Range & Units 05/01/23 07:25   Cholesterol,Tot 120 - 200 mg/dL 176   Triglycerides 0 - 150 mg/dL 21   HDL 40.0 - 60.0 mg/dL 75.0 (H)   Non HDL Cholesterol 30 - 160  101   LDL <100 mg/dL 97   Chol-Hdl Ratio  2.35     He does not have diabetic kidney disease or microalbuminuria  UACR was < 30 on  5/2023   Latest Reference Range & Units 05/01/23 07:25   Micro Alb Creat Ratio 0 - 30 ug/mg 5   Creatinine, Urine mg/dL 91   Microalbumin,  Urine Random 0.0 - 30.0 mg/L 5.0       He had an eye exam on January 2023  And we have records he does not have severe diabetic retinopathy    Hypothyroidism  He has hypothyroidism and is taking Levothyroxine 75 MCG daily.    He denies  palpitations, tremors, cold intolerance and heat intolerance.   Latest Reference Range & Units 05/01/23 07:25   TSH 0.36 - 3.74 uIU/mL 1.95   Free T-4 0.76 - 1.46 ng/dL 0.88     BG Diary:  Please see Dexcom G6 CGM download    Weight has been stable    Diabetes Complications   Retinopathy: Known retinopathy.  Last eye exam: 1/13/2023 (Claudia)  Neuropathy: Denies paresthesias or numbness in hands or feet. Denies any foot wounds.  Exercise: walking and workout   Diet: Fair.  Patient's medications, allergies, and social histories were reviewed and updated as appropriate.    ROS:     CONS:     No fever, no chills   EYES:     No diplopia, no blurry vision   CV:           No chest pain, no palpitations   PULM:     No SOB, no cough, no hemoptysis.   GI:            No nausea, no vomiting, no diarrhea, no constipation   ENDO:     No polyuria, no polydipsia, no heat intolerance, no cold intolerance       Past Medical History:  Problem List:  2021-04: Eczema  2021-04: History of basal cell cancer  2021-04: Xerosis of skin  2020-06: Hashimoto's thyroiditis  2019-09: Primary osteoarthritis of right knee  2019-09: Synovial cyst of right knee  2017-08: Non-seasonal allergic rhinitis due to pollen  2017-05: History of colonic polyps  2017-02: Coronary artery disease due to lipid rich plaque - s/p PCI   to LAD with recurrent PCI for in stent restenosis   2017-02: Long-term insulin use (HCC)  2017-02: Fitting and adjustment of insulin pump  2017-02: Presence of insulin pump  2017-02: DM type 1 with diabetic mixed hyperlipidemia (HCC)  2017-02: Hypothyroidism  2017-02: Controlled diabetes mellitus type 1 without complications   (HCC)  2017-02: Prostatism  2017-02: Insulin pump in place  2017-02:  "Stent in LAD - 5/2003 with subsequent cutting balloon   angioplasty 2004 repeat angio 2008 in setting of abnormal stress   showed patent stent  Dyslipidemia      Past Surgical History:  Past Surgical History:   Procedure Laterality Date    [place] COLONOSCOPY,DIAGNOSTIC N/A 3/17/2020    Procedure: COLONOSCOPY;  Surgeon: Julius Batista M.D.;  Location: SURGERY [facility];  Service: Gen Robotic        Allergies:  Patient has no known allergies.     Social History:  Social History     Tobacco Use    Smoking status: Never    Smokeless tobacco: Never   Vaping Use    Vaping Use: Never used   Substance Use Topics    Alcohol use: Yes     Comment: 1 glass of scotch nightly    Drug use: No        Family History:   family history includes Alzheimer's Disease in his mother; Cancer in his father.      PHYSICAL EXAM:   OBJECTIVE:  Vital signs: /60 (BP Location: Right arm, Patient Position: Sitting)   Pulse 78   Ht 1.753 m (5' 9\")   Wt 84.4 kg (186 lb 1.6 oz)   SpO2 96%   BMI 27.48 kg/m²   GENERAL: Well-developed, well-nourished in no apparent distress.   EYE:  No ocular asymmetry, PERRLA  HENT: Pink, moist mucous membranes.    NECK: No thyromegaly.   CARDIOVASCULAR:  No murmurs  LUNGS: Clear breath sounds  ABDOMEN: Soft, nontender   EXTREMITIES: No clubbing, cyanosis, or edema.   NEUROLOGICAL: No gross focal motor abnormalities   LYMPH: No cervical adenopathy palpated.   SKIN: No rashes, lesions.       Labs:  Lab Results   Component Value Date/Time    HBA1C 6.0 (A) 08/24/2023 09:25 AM        Lab Results   Component Value Date/Time    WBC 4.6 (L) 10/24/2023 10:26 AM    RBC 4.64 (L) 10/24/2023 10:26 AM    HEMOGLOBIN 14.6 10/24/2023 10:26 AM    MCV 94.4 (H) 10/24/2023 10:26 AM    MCH 31.5 (H) 10/24/2023 10:26 AM    MCHC 33.3 10/24/2023 10:26 AM    RDW 14.0 10/24/2023 10:26 AM    MPV 10.1 10/24/2023 10:26 AM       Lab Results   Component Value Date/Time    SODIUM 137 05/01/2023 07:25 AM    POTASSIUM 4.5 05/01/2023 07:25 " AM    CHLORIDE 102 05/01/2023 07:25 AM    CO2 27 05/01/2023 07:25 AM    ANION 13 05/01/2023 07:25 AM    GLUCOSE 120 (H) 05/01/2023 07:25 AM    BUN 26 (H) 05/01/2023 07:25 AM    CREATININE 1.2 05/01/2023 07:25 AM    CALCIUM 8.8 05/01/2023 07:25 AM    ASTSGOT 22 05/01/2023 07:25 AM    ALTSGPT 29 05/01/2023 07:25 AM    TBILIRUBIN 0.6 05/01/2023 07:25 AM    ALBUMIN 3.6 05/01/2023 07:25 AM    TOTPROTEIN 7.2 05/01/2023 07:25 AM    AGRATIO 1.0 05/01/2023 07:25 AM       Lab Results   Component Value Date/Time    CHOLSTRLTOT 138 10/16/2019 0825    TRIGLYCERIDE 35 10/16/2019 0825    HDL 65.0 (H) 10/16/2019 0825    LDL 66 10/16/2019 0825    CHOLHDLRAT 2.12 10/16/2019 0825    NONHDL 73 10/16/2019 0825       Lab Results   Component Value Date/Time    MALBCRT 5 05/01/2023 07:25 AM    MICROALBUR 5.0 05/01/2023 07:25 AM        Lab Results   Component Value Date/Time    TSHULTRASEN 2.10 04/17/2019 0757     No results found for: FREEDIR  No results found for: FREET3  No results found for: THYSTIMIG        ASSESSMENT/PLAN:   1. Controlled diabetes mellitus type 1 without complications (HCC)  Stable  Medication:  His pump is infusing Novolog  (2 vials per month) - continue  Current pump settings are as follows:    Midnight 0.725 units/h  5 AM 0.900 units/h  8 AM 0.850 units/h  12 noon 0.750  6 PM 0.900 units/h    Insulin to carbohydrate ratio midnight 6.9  MN 6.9 g  5:30 AM 5.7  11 AM 9.5  4 PM 6.0    Sensitivity 35,    Target blood sugar   midnight 100-130,  5 AM ,  8 -130  Therapeutic CGM was downloaded and reviewed  Discussed [employer] Mobi in the near future   He is up-to-date with his labs  He is up-to-date with his eye exam  - NOVOLOG 100 UNIT/ML Solution; INJECT 50 TO 60 UNITS UNDER THE SKIN CONTINUOUS AS DIRECTED  Dispense: 50 mL; Refill: 3  - Comp Metabolic Panel; Future    2. Presence of insulin pump  Insulin pump is in place  - NOVOLOG 100 UNIT/ML Solution; INJECT 50 TO 60 UNITS UNDER THE SKIN CONTINUOUS AS DIRECTED   Dispense: 50 mL; Refill: 3    3. Acquired hypothyroidism  Stable  Medication:  Levothyroxine 75 mcg daily - continue  Patient understands to take the medication on empty stomach prior to breakfast  - TSH; Future  - FREE THYROXINE; Future    4. Dyslipidemia  stable  Continue atorvastatin    5. Vitamin D deficiency  unstable  Continue current regimen- see HPI  - VITAMIN D,25 HYDROXY (DEFICIENCY); Future     Disposition: follow up in 3 months with blood work.       Return in about 3 months (around 2/21/2024).      Thank you kindly for allowing me to participate in the diabetes care plan for this patient.    Daniel Corley, APRN   11/21/23    CC:   Calos Ingram M.D.

## 2023-11-21 NOTE — PROGRESS NOTES
"RN-CDE Note    Subjective:   Endocrinology Clinic Progress Note  PCP: Calos Ingram M.D.    HPI:  Amaury Garner is a 68 y.o. old patient who is seen today by the Diabetes Nurse Specialist for review of controlled type 1 diabetes on insulin pump.    Recent changes in health: denies changes.   DM:   Last A1c:   Lab Results   Component Value Date/Time    HBA1C 6.0 (A) 08/24/2023 09:25 AM     Too soon for A1c test.   Previous A1c was 6.0 on 8/24/23  A1C GOAL: < 7    Diabetes Medications:   Novolog via pump      Exercise: walking 2 miles daily.  Doing 90 minutes workout several times a week.   Diet: \"healthy\" diet  in general  Patient's body mass index is unknown because there is no height or weight on file. Exercise and nutrition counseling were performed at this visit.    Glucose monitoring frequency: using Mela    Hypoglycemic episodes: yes -  on occasion  Keeps apple juice and Ensure with him all of the time.   Last Retinal Exam: on file and up-to-date  Daily Foot Exam: Yes   Foot Exam:  Monofilament: current.   Lab Results   Component Value Date/Time    MALBCRT 5 05/01/2023 07:25 AM    MICROALBUR 5.0 05/01/2023 07:25 AM        ACR Albumin/Creatinine Ratio goal <30     HTN:   Blood pressure goal <130/<80 .   Currently Rx ACE/ARB: Not Indicated     Dyslipidemia:    Lab Results   Component Value Date/Time    CHOLSTRLTOT 176 05/01/2023 07:25 AM    LDL 97 05/01/2023 07:25 AM    HDL 75.0 (H) 05/01/2023 07:25 AM    TRIGLYCERIDE 21 05/01/2023 07:25 AM         Currently Rx Statin: Yes     He  reports that he has never smoked. He has never used smokeless tobacco.      Plan:     Discussed and educated on:   - All medications, side effects and compliance (discussed carefully)  - HbA1C: target  - Home glucose monitoring emphasized  - Weight control and daily exercise    Recommended medication changes: no changes    "

## 2024-02-27 ENCOUNTER — OFFICE VISIT (OUTPATIENT)
Dept: ENDOCRINOLOGY | Facility: MEDICAL CENTER | Age: 69
End: 2024-02-27
Payer: MEDICARE

## 2024-02-27 VITALS
HEIGHT: 69 IN | BODY MASS INDEX: 28.27 KG/M2 | RESPIRATION RATE: 17 BRPM | WEIGHT: 190.9 LBS | SYSTOLIC BLOOD PRESSURE: 98 MMHG | DIASTOLIC BLOOD PRESSURE: 60 MMHG | HEART RATE: 77 BPM | OXYGEN SATURATION: 96 %

## 2024-02-27 DIAGNOSIS — E78.5 DYSLIPIDEMIA: ICD-10-CM

## 2024-02-27 DIAGNOSIS — E55.9 VITAMIN D DEFICIENCY: ICD-10-CM

## 2024-02-27 DIAGNOSIS — Z96.41 PRESENCE OF INSULIN PUMP: ICD-10-CM

## 2024-02-27 DIAGNOSIS — E10.9 CONTROLLED DIABETES MELLITUS TYPE 1 WITHOUT COMPLICATIONS (HCC): ICD-10-CM

## 2024-02-27 DIAGNOSIS — E03.9 ACQUIRED HYPOTHYROIDISM: ICD-10-CM

## 2024-02-27 LAB
HBA1C MFR BLD: 5.9 % (ref ?–5.8)
POCT INT CON NEG: NEGATIVE
POCT INT CON POS: POSITIVE

## 2024-02-27 PROCEDURE — 83036 HEMOGLOBIN GLYCOSYLATED A1C: CPT

## 2024-02-27 PROCEDURE — 3078F DIAST BP <80 MM HG: CPT

## 2024-02-27 PROCEDURE — 99214 OFFICE O/P EST MOD 30 MIN: CPT

## 2024-02-27 PROCEDURE — 3074F SYST BP LT 130 MM HG: CPT

## 2024-02-27 ASSESSMENT — FIBROSIS 4 INDEX: FIB4 SCORE: 1.5

## 2024-02-27 NOTE — PROGRESS NOTES
RN-CDE Note    Subjective:   Endocrinology Clinic Progress Note  PCP: Calos Ingram M.D.    HPI:  Amaury Garner is a 68 y.o. old patient who is seen today by the Diabetes Nurse Specialist for review of his controlled type 1 diabetes on insulin pump.    Recent changes in health:  denies changes.   DM:   Last A1c:   Lab Results   Component Value Date/Time    HBA1C 5.9 (A) 02/27/2024 10:29 AM      Previous A1c was 6 on 8/24/23  A1C GOAL: < 7    Diabetes Medications:   Novolog via pump    Patient's body mass index is 28.19 kg/m². Exercise and nutrition counseling were performed at this visit.    Glucose monitoring frequency:         Hypoglycemic episodes: yes -  on occasion  Last Retinal Exam: on file and up-to-date  Foot Exam:  Monofilament: current.   Lab Results   Component Value Date/Time    MALBCRT 5 05/01/2023 07:25 AM    MICROALBUR 5.0 05/01/2023 07:25 AM        ACR Albumin/Creatinine Ratio goal <30     HTN:   Blood pressure goal <130/<80 .   Currently Rx ACE/ARB:  Lisinopril 10 mg     Dyslipidemia:    Lab Results   Component Value Date/Time    CHOLSTRLTOT 176 05/01/2023 07:25 AM    LDL 97 05/01/2023 07:25 AM    HDL 75.0 (H) 05/01/2023 07:25 AM    TRIGLYCERIDE 21 05/01/2023 07:25 AM         Currently Rx Statin:  Atorvastatin 40 mg     He  reports that he has never smoked. He has never used smokeless tobacco.      Plan:     Discussed and educated on:   - All medications, side effects and compliance (discussed carefully)  - HbA1C: target  - Home glucose monitoring emphasized  - Weight control and daily exercise    Recommended medication changes: none

## 2024-02-27 NOTE — PROGRESS NOTES
CHIEF COMPLAINT: Patient is here for follow up of Type 1 Diabetes Mellitus    HPI:     Amaury Garner is a 68 y.o. male with Type 1 Diabetes Mellitus here for follow up. He was previously followed by LORENZO Salinas  He has a history of primary hypothyroidism, and coronary artery disease aside from type 1 diabetes.  He is on insulin pump therapy with a Medtronic 670 G pump.  His insurance does not cover the Guardian sensor thus he is using a Dexcom G6 CGM.  He is feeling good today    Diabetes Type 1  POC A1c on 2/27/24 is 5.9  Labs from 8/24/2023 show A1c was 6.0%  Labs from 5/8/2023 show a1c was 6.1%  Labs from 2/3/2023 show a1c  was 6.3%  Labs from 7/15/2022 show a1c was 6.4%        AGP review shows minimal highs and lows  Current medication:  His pump is infusing Novolog  (2 vials per month)  Current pump settings are as follows:    Midnight 0.725 units/h  5 AM 0.900 units/h  8 AM 0.850 units/h  12 noon 0.750  6 PM 0.900 units/h    Insulin to carbohydrate ratio midnight 6.9  MN 6.9 g  5:30 AM 5.7  11 AM 9.5  4 PM 6.0    Sensitivity 35,    Target blood sugar   midnight 100-130,  5 AM ,  8 -130    He reports having lows 1 times a day  His pump is out of warranty on June 2024 ( per medicare)   Today we talked about the Mobi     He an ingrown toenail on the right big toe but is working on podiatry there are no signs of infection and has resolved.   He changed sites for infusion sets resulting in better absorption     BG Diary:  Please see Dexcom G6 CGM download    Weight has been stable    Diabetes Complications   Retinopathy: No Known retinopathy.  Last eye exam: 1/12/2024 (Claudia) And we have records he does not have severe diabetic retinopathy  Neuropathy: Denies paresthesias or numbness in hands or feet. Denies any foot wounds.  Exercise: walking and workout   Diet: Fair.    2. Hyperlipidemia  He  is taking Lipitor 40 mg daily   Stopped taking zetia a year ago  He does have CAD  He is tolerating  both very well.    Latest Reference Range & Units 05/01/23 07:25   Cholesterol,Tot 120 - 200 mg/dL 176   Triglycerides 0 - 150 mg/dL 21   HDL 40.0 - 60.0 mg/dL 75.0 (H)   Non HDL Cholesterol 30 - 160  101   LDL <100 mg/dL 97   Chol-Hdl Ratio  2.35     He does not have diabetic kidney disease or microalbuminuria  UACR was < 30 on  5/2023  Currently lisinopril 10 mg   BP: 98/60   Latest Reference Range & Units 05/01/23 07:25   Micro Alb Creat Ratio 0 - 30 ug/mg 5   Creatinine, Urine mg/dL 91   Microalbumin, Urine Random 0.0 - 30.0 mg/L 5.0      Latest Reference Range & Units 02/19/24 08:59   GFR (CKD-EPI) >60 mL/min/1.73 m 2 65     3.Hypothyroidism  He has hypothyroidism and is taking Levothyroxine 75 MCG daily.    He denies  palpitations, tremors, cold intolerance and heat intolerance.   Latest Reference Range & Units 02/19/24 08:59   TSH 0.36 - 3.74 uIU/mL 2.12   Free T-4 0.76 - 1.46 ng/dL 0.88     4. Vitamin D deficiency  Currently taking vitamin D3 2000 daily    Latest Reference Range & Units 02/19/24 08:59   25-Hydroxy   Vitamin D 25 30 - 100 ng/mL 84     Patient's medications, allergies, and social histories were reviewed and updated as appropriate.    ROS:     CONS:     No fever, no chills   EYES:     No diplopia, no blurry vision   CV:           No chest pain, no palpitations   PULM:     No SOB, no cough, no hemoptysis.   GI:            No nausea, no vomiting, no diarrhea, no constipation   ENDO:     No polyuria, no polydipsia, no heat intolerance, no cold intolerance       Past Medical History:  Problem List:  2021-04: Eczema  2021-04: History of basal cell cancer  2021-04: Xerosis of skin  2020-06: Hashimoto's thyroiditis  2019-09: Primary osteoarthritis of right knee  2019-09: Synovial cyst of right knee  2017-08: Non-seasonal allergic rhinitis due to pollen  2017-05: History of colonic polyps  2017-02: Coronary artery disease due to lipid rich plaque - s/p PCI   to LAD with recurrent PCI for in stent  "restenosis   2017-02: Long-term insulin use (HCC)  2017-02: Fitting and adjustment of insulin pump  2017-02: Presence of insulin pump  2017-02: DM type 1 with diabetic mixed hyperlipidemia (HCC)  2017-02: Hypothyroidism  2017-02: Controlled diabetes mellitus type 1 without complications   ([facility])  2017-02: Prostatism  2017-02: Insulin pump in place  2017-02: Stent in LAD - 5/2003 with subsequent cutting balloon   angioplasty 2004 repeat angio 2008 in setting of abnormal stress   showed patent stent  Dyslipidemia      Past Surgical History:  Past Surgical History:   Procedure Laterality Date    [place] COLONOSCOPY,DIAGNOSTIC N/A 3/17/2020    Procedure: COLONOSCOPY;  Surgeon: Julius Batista M.D.;  Location: SURGERY [facility];  Service: Gen Robotic        Allergies:  Patient has no known allergies.     Social History:  Social History     Tobacco Use    Smoking status: Never    Smokeless tobacco: Never   Vaping Use    Vaping Use: Never used   Substance Use Topics    Alcohol use: Yes     Comment: 1 glass of scotch nightly    Drug use: No        Family History:   family history includes Alzheimer's Disease in his mother; Cancer in his father.      PHYSICAL EXAM:   OBJECTIVE:  Vital signs: BP 98/60 (BP Location: Right arm, Patient Position: Sitting)   Pulse 77   Resp 17   Ht 1.753 m (5' 9\")   Wt 86.6 kg (190 lb 14.4 oz)   SpO2 96%   BMI 28.19 kg/m²   GENERAL: Well-developed, well-nourished in no apparent distress.   EYE:  No ocular asymmetry, PERRLA  HENT: Pink, moist mucous membranes.    NECK: No thyromegaly.   CARDIOVASCULAR:  No murmurs  LUNGS: Clear breath sounds  ABDOMEN: Soft, nontender   EXTREMITIES: No clubbing, cyanosis, or edema.   NEUROLOGICAL: No gross focal motor abnormalities   LYMPH: No cervical adenopathy palpated.   SKIN: No rashes, lesions.       Labs:  Lab Results   Component Value Date/Time    HBA1C 5.9 (A) 02/27/2024 10:29 AM        Lab Results   Component Value Date/Time    WBC 4.6 (L) " 10/24/2023 10:26 AM    RBC 4.64 (L) 10/24/2023 10:26 AM    HEMOGLOBIN 14.6 10/24/2023 10:26 AM    MCV 94.4 (H) 10/24/2023 10:26 AM    MCH 31.5 (H) 10/24/2023 10:26 AM    MCHC 33.3 10/24/2023 10:26 AM    RDW 14.0 10/24/2023 10:26 AM    MPV 10.1 10/24/2023 10:26 AM       Lab Results   Component Value Date/Time    SODIUM 139 02/19/2024 08:59 AM    POTASSIUM 4.5 02/19/2024 08:59 AM    CHLORIDE 103 02/19/2024 08:59 AM    CO2 29 02/19/2024 08:59 AM    ANION 12 02/19/2024 08:59 AM    GLUCOSE 70 (L) 02/19/2024 08:59 AM    BUN 32 (H) 02/19/2024 08:59 AM    CREATININE 1.2 02/19/2024 08:59 AM    CALCIUM 8.7 02/19/2024 08:59 AM    ASTSGOT 25 02/19/2024 08:59 AM    ALTSGPT 29 02/19/2024 08:59 AM    TBILIRUBIN 0.5 02/19/2024 08:59 AM    ALBUMIN 3.7 02/19/2024 08:59 AM    TOTPROTEIN 7.1 02/19/2024 08:59 AM    AGRATIO 1.1 02/19/2024 08:59 AM       Lab Results   Component Value Date/Time    CHOLSTRLTOT 138 10/16/2019 0825    TRIGLYCERIDE 35 10/16/2019 0825    HDL 65.0 (H) 10/16/2019 0825    LDL 66 10/16/2019 0825    CHOLHDLRAT 2.12 10/16/2019 0825    NONHDL 73 10/16/2019 0825       Lab Results   Component Value Date/Time    MALBCRT 5 05/01/2023 07:25 AM    MICROALBUR 5.0 05/01/2023 07:25 AM        Lab Results   Component Value Date/Time    TSHULTRASEN 2.10 04/17/2019 0757     No results found for: FREEDIR  No results found for: FREET3  No results found for: THYSTIMIG        ASSESSMENT/PLAN:   1. Controlled diabetes mellitus type 1 without complications (HCC)  Stable  Medication:  His pump is infusing Novolog  (2 vials per month) - continue  Current pump settings are as follows:    Midnight 0.725 units/h  5 AM 0.900 units/h  8 AM 0.850 units/h  12 noon 0.750  6 PM 0.900 units/h    Insulin to carbohydrate ratio midnight 6.9  MN 6.9 g  5:30 AM 5.7  11 AM 9.5  4 PM 6.0    Sensitivity 35,    Target blood sugar   midnight 100-130,  5 AM ,  8 -130  Lisinopril 10 mg daily - change to lisinopril 5 mg daily   Therapeutic CGM was  downloaded and reviewed  Discussed [employer] Mobi in the near future   He is up-to-date with his labs  He is up-to-date with his eye exam  - POCT Hemoglobin A1C  - Comp Metabolic Panel; Future  - MICROALBUMIN CREAT RATIO URINE; Future    2. Acquired hypothyroidism  Stable  Medication:  Levothyroxine 75 mcg daily - continue  Patient understands to take the medication on empty stomach prior to breakfast  - TSH; Future  - FREE THYROXINE; Future    3. Dyslipidemia  stable  atorvastatin 40 mg every evening - continue  - Lipid Profile; Future    4. Vitamin D deficiency  Stable  Recommend vitamin D 3 2000 IU every other day  - VITAMIN D,25 HYDROXY (DEFICIENCY); Future    5. Presence of insulin pump  Insulin pump is in place     Disposition: follow up in 3 months with blood work.       Return in about 3 months (around 5/27/2024). Patient will have blood work done 1 week prior to follow up in 3 months.       Thank you kindly for allowing me to participate in the diabetes care plan for this patient.    Daniel Corley, APRN   2/27/24    CC:   Calos Ingram M.D.

## 2024-04-08 DIAGNOSIS — E10.9 CONTROLLED DIABETES MELLITUS TYPE 1 WITHOUT COMPLICATIONS (HCC): ICD-10-CM

## 2024-04-08 DIAGNOSIS — Z96.41 PRESENCE OF INSULIN PUMP: ICD-10-CM

## 2024-04-08 RX ORDER — INSULIN ASPART 100 [IU]/ML
INJECTION, SOLUTION INTRAVENOUS; SUBCUTANEOUS
Qty: 50 ML | Refills: 3 | Status: SHIPPED | OUTPATIENT
Start: 2024-04-08

## 2024-05-29 NOTE — PROGRESS NOTES
"RN-CDE Note    Subjective:   Endocrinology Clinic Progress Note  PCP: Calos Ingram M.D.    HPI:  Amaury Garner is a 68 y.o. old patient who is seen today by the Diabetes Nurse Specialist for review of his controlled type 1 diabetes on insulin pump.    Recent changes in health:  denies changes  DM:   Last A1c:   Lab Results   Component Value Date/Time    HBA1C 6.0 (A) 05/30/2024 10:17 AM      Previous A1c was 5.9 on 2/27/24  A1C GOAL: < 7    Diabetes Medications:   Novolog via pump          Exercise: walking 1.5 miles every morning and plus additional exercise daily  Diet: \"healthy\" diet  in general  Patient's body mass index is unknown because there is no height or weight on file. Exercise and nutrition counseling were performed at this visit.    Glucose monitoring frequency: using Dexcom CGm          Hypoglycemic episodes: cgm data shows blood sugars less than 70 3% of the time in the past 2 weeks.   Last Retinal Exam: on file and up-to-date    Foot Exam:  Monofilament: current.   Lab Results   Component Value Date/Time    MALBCRT 5 05/01/2023 07:25 AM    MICROALBUR 5.0 05/01/2023 07:25 AM        ACR Albumin/Creatinine Ratio goal <30     HTN:   Blood pressure goal <130/<80 .   Currently Rx ACE/ARB: Yes     Dyslipidemia:    Lab Results   Component Value Date/Time    CHOLSTRLTOT 176 05/01/2023 07:25 AM    LDL 97 05/01/2023 07:25 AM    HDL 75.0 (H) 05/01/2023 07:25 AM    TRIGLYCERIDE 21 05/01/2023 07:25 AM         Currently Rx Statin: Yes     He  reports that he has never smoked. He has never used smokeless tobacco.      Plan:     Discussed and educated on:   - All medications, side effects and compliance (discussed carefully)  - HbA1C: target  - Home glucose monitoring emphasized  - Weight control and daily exercise    Recommended medication changes: none    "

## 2024-05-30 ENCOUNTER — OFFICE VISIT (OUTPATIENT)
Dept: ENDOCRINOLOGY | Facility: MEDICAL CENTER | Age: 69
End: 2024-05-30
Attending: INTERNAL MEDICINE
Payer: MEDICARE

## 2024-05-30 VITALS
HEIGHT: 69 IN | RESPIRATION RATE: 17 BRPM | SYSTOLIC BLOOD PRESSURE: 114 MMHG | HEART RATE: 78 BPM | BODY MASS INDEX: 27.5 KG/M2 | WEIGHT: 185.7 LBS | DIASTOLIC BLOOD PRESSURE: 66 MMHG | OXYGEN SATURATION: 97 %

## 2024-05-30 DIAGNOSIS — E10.9 CONTROLLED DIABETES MELLITUS TYPE 1 WITHOUT COMPLICATIONS (HCC): ICD-10-CM

## 2024-05-30 DIAGNOSIS — E78.5 DYSLIPIDEMIA: ICD-10-CM

## 2024-05-30 DIAGNOSIS — Z96.41 PRESENCE OF INSULIN PUMP: ICD-10-CM

## 2024-05-30 DIAGNOSIS — E06.3 HASHIMOTO'S THYROIDITIS: ICD-10-CM

## 2024-05-30 DIAGNOSIS — E03.9 ACQUIRED HYPOTHYROIDISM: ICD-10-CM

## 2024-05-30 DIAGNOSIS — E55.9 VITAMIN D DEFICIENCY: ICD-10-CM

## 2024-05-30 DIAGNOSIS — Z79.4 LONG-TERM INSULIN USE (HCC): ICD-10-CM

## 2024-05-30 LAB
HBA1C MFR BLD: 6 % (ref ?–5.8)
POCT INT CON NEG: NEGATIVE
POCT INT CON POS: POSITIVE

## 2024-05-30 ASSESSMENT — FIBROSIS 4 INDEX: FIB4 SCORE: 1.5

## 2024-05-30 NOTE — PROGRESS NOTES
CHIEF COMPLAINT: Patient is here for follow up of Type 1 Diabetes Mellitus    HPI:     Amaury Garner is a 68 y.o. male with Type 1 Diabetes Mellitus here for follow up.      Labs from 8/24/2023 show a1c is 6.0%  Labs from 5/8/2023 show a1c was 6.1%  Labs from 2/3/2023 show a1c  was 6.3%  Labs from 7/15/2022 show a1c was 6.4%  Labs from 4/20/2021 show a1c was 6.6%  Labs from 10/19/2021 show a1c was 6.7%  Labs from 3/15/2021 show A1c was 7.0%  Labs from 10/6/2020 show a1c was 7.1%  Labs from 9/15/2020 show a1c was 6.7%  Labs from 6/15/2020 show a1c was 7.5%      He was previously followed by LORENZO Salinas  He has a history of primary hypothyroidism, and coronary artery disease aside from type 1 diabetes.  He is on insulin pump therapy with a Verdeeco 670 G pump.  His insurance does not cover the Guardian sensor thus he is using a Dexcom G6 CGM.      His pump is infusing Novolog  (2 vials per month)  Current pump settings are as follows:    Midnight 0.725 units/h  5 AM 0.900 units/h  8 AM 0.825 units/h  12 noon 0800  6 PM 0.900 units/h  Total basal 10.95    Insulin to carbohydrate ratio midnight 6.9  MN 6.9 g  5:30 AM 5.7  11 AM 9.5  4 PM 6.0    Sensitivity 35,    Target blood sugar   midnight 100-130,  5 AM ,  8 -130          Download of his CGM today shows average sugar is  125 (137 129 130 139 172 154 156)  with time in range of  90%  (77% 83% b82% 78% 55% 70% 69%)   AGP review shows minimal highs and lows      He has hyperlipidemia and  He  is taking Lipitor 40 mg daily  He does have CAD  He is tolerating both very well.   His last LDL cholesterol was well controlled at 97 on 5/2023      He does not have diabetic kidney disease or microalbuminuria  UACR was < 30 on  5/2023      He had an eye exam on January 2024  And we have records he does not have severe diabetic retinopathy      He has hypothyroidism and is taking Levothyroxine 75 MCG daily.    He denies  palpitations, tremors, cold intolerance  and heat intolerance.  His TSH is 2.1 on 2/2024  His TSH is 1.9 on 5/2023  His TSH was 2.24 on Jan 2022        BG Diary:  Please see Dexcom G6 CGM download    Weight has been stable    Diabetes Complications   Retinopathy: Known retinopathy.  Last eye exam: 1/2024 (Claudia)  Neuropathy: Denies paresthesias or numbness in hands or feet. Denies any foot wounds.  Exercise: Minimal.  Diet: Fair.  Patient's medications, allergies, and social histories were reviewed and updated as appropriate.    ROS:     CONS:     No fever, no chills   EYES:     No diplopia, no blurry vision   CV:           No chest pain, no palpitations   PULM:     No SOB, no cough, no hemoptysis.   GI:            No nausea, no vomiting, no diarrhea, no constipation   ENDO:     No polyuria, no polydipsia, no heat intolerance, no cold intolerance       Past Medical History:  Problem List:  2021-04: Eczema  2021-04: History of basal cell cancer  2021-04: Xerosis of skin  2020-06: Hashimoto's thyroiditis  2019-09: Primary osteoarthritis of right knee  2019-09: Synovial cyst of right knee  2017-08: Non-seasonal allergic rhinitis due to pollen  2017-05: History of colonic polyps  2017-02: Coronary artery disease due to lipid rich plaque - s/p PCI   to LAD with recurrent PCI for in stent restenosis   2017-02: Long-term insulin use (HCC)  2017-02: Fitting and adjustment of insulin pump  2017-02: Presence of insulin pump  2017-02: DM type 1 with diabetic mixed hyperlipidemia (HCC)  2017-02: Hypothyroidism  2017-02: Controlled diabetes mellitus type 1 without complications   (HCC)  2017-02: Prostatism  2017-02: Insulin pump in place  2017-02: Stent in LAD - 5/2003 with subsequent cutting balloon   angioplasty 2004 repeat angio 2008 in setting of abnormal stress   showed patent stent  Dyslipidemia      Past Surgical History:  Past Surgical History:   Procedure Laterality Date    ID COLONOSCOPY,DIAGNOSTIC N/A 3/17/2020    Procedure: COLONOSCOPY;  Surgeon: Julius  "SOTO Batista M.D.;  Location: SURGERY [facility];  Service: Gen Robotic        Allergies:  Patient has no known allergies.     Social History:  Social History     Tobacco Use    Smoking status: Never    Smokeless tobacco: Never   Vaping Use    Vaping status: Never Used   Substance Use Topics    Alcohol use: Yes     Comment: 1 glass of scotch nightly    Drug use: No        Family History:   family history includes Alzheimer's Disease in his mother; Cancer in his father.      PHYSICAL EXAM:   OBJECTIVE:  Vital signs: /66 (BP Location: Right arm, Patient Position: Sitting)   Pulse 78   Resp 17   Ht 1.753 m (5' 9\")   Wt 84.2 kg (185 lb 11.2 oz)   SpO2 97%   BMI 27.42 kg/m²   GENERAL: Well-developed, well-nourished in no apparent distress.   EYE:  No ocular asymmetry, PERRLA  HENT: Pink, moist mucous membranes.    NECK: No thyromegaly.   CARDIOVASCULAR:  No murmurs  LUNGS: Clear breath sounds  ABDOMEN: Soft, nontender   EXTREMITIES: No clubbing, cyanosis, or edema.   NEUROLOGICAL: No gross focal motor abnormalities   LYMPH: No cervical adenopathy palpated.   SKIN: No rashes, lesions.       Labs:  Lab Results   Component Value Date/Time    HBA1C 6.0 (A) 05/30/2024 10:17 AM        Lab Results   Component Value Date/Time    WBC 4.6 (L) 10/24/2023 10:26 AM    RBC 4.64 (L) 10/24/2023 10:26 AM    HEMOGLOBIN 14.6 10/24/2023 10:26 AM    MCV 94.4 (H) 10/24/2023 10:26 AM    MCH 31.5 (H) 10/24/2023 10:26 AM    MCHC 33.3 10/24/2023 10:26 AM    RDW 14.0 10/24/2023 10:26 AM    MPV 10.1 10/24/2023 10:26 AM       Lab Results   Component Value Date/Time    SODIUM 139 02/19/2024 08:59 AM    POTASSIUM 4.5 02/19/2024 08:59 AM    CHLORIDE 103 02/19/2024 08:59 AM    CO2 29 02/19/2024 08:59 AM    ANION 12 02/19/2024 08:59 AM    GLUCOSE 70 (L) 02/19/2024 08:59 AM    BUN 32 (H) 02/19/2024 08:59 AM    CREATININE 1.2 02/19/2024 08:59 AM    CALCIUM 8.7 02/19/2024 08:59 AM    ASTSGOT 25 02/19/2024 08:59 AM    ALTSGPT 29 02/19/2024 " 08:59 AM    TBILIRUBIN 0.5 02/19/2024 08:59 AM    ALBUMIN 3.7 02/19/2024 08:59 AM    TOTPROTEIN 7.1 02/19/2024 08:59 AM    AGRATIO 1.1 02/19/2024 08:59 AM       Lab Results   Component Value Date/Time    CHOLSTRLTOT 138 10/16/2019 0825    TRIGLYCERIDE 35 10/16/2019 0825    HDL 65.0 (H) 10/16/2019 0825    LDL 66 10/16/2019 0825    CHOLHDLRAT 2.12 10/16/2019 0825    NONHDL 73 10/16/2019 0825       Lab Results   Component Value Date/Time    MALBCRT 5 05/01/2023 07:25 AM    MICROALBUR 5.0 05/01/2023 07:25 AM        Lab Results   Component Value Date/Time    TSHULTRASEN 2.10 04/17/2019 0757     No results found for: FREEDIR  No results found for: FREET3  No results found for: THYSTIMIG        ASSESSMENT/PLAN:     1. Controlled diabetes mellitus type 1 without complications (HCC)  A1c is stable at 6.0%  Therapeutic CGM was downloaded and reviewed  I did not make any changes to his pump settings  He will switch to Tandem in June to July 2024 as he wants to keep the closed loop system   Follow-up with the office in 3 months with Daniel and see her permanently for long term care      2. Acquired hypothyroidism  Controlled  Continue current levothyroxine dose 75 MCG daily  Repeat TSH in 3-6 months    3. Hashimoto's disease  This is the likely etiology of his hypothyroidism.    10% of individuals with Hashimoto's have negative antibodies    4. Dyslipidemia  Controlled  Continue atorvastatin  Repeat fasting lipids in 3 months      5. Presence of insulin pump  Insulin pump is in place    6. Encounter for long-term (current) use of insulin (HCC)  Patient is on long-term insulin therapy for type 1 diabetes      Disposition: see NP for 3 month follow up due to medicare patient needing closer follow up to approve supplies       Return in about 3 months (around 8/30/2024).      Thank you kindly for allowing me to participate in the diabetes care plan for this patient.    Favio Bowens MD, FACE, [facility]    CC:   Calos Ingram M.D.

## 2024-06-18 ENCOUNTER — TELEPHONE (OUTPATIENT)
Dept: ENDOCRINOLOGY | Facility: MEDICAL CENTER | Age: 69
End: 2024-06-18
Payer: MEDICARE

## 2024-06-18 NOTE — TELEPHONE ENCOUNTER
----- Message from Favio Bowens M.D. sent at 6/14/2024 10:25 AM PDT -----  Labs done - c-peptide likely needed for application for tandem pump  - patient will apply for tandem June to jly 2024 - we have to send parachute order

## 2024-06-25 ENCOUNTER — TELEPHONE (OUTPATIENT)
Dept: ENDOCRINOLOGY | Facility: MEDICAL CENTER | Age: 69
End: 2024-06-25
Payer: MEDICARE

## 2024-09-03 ENCOUNTER — APPOINTMENT (OUTPATIENT)
Dept: ENDOCRINOLOGY | Facility: MEDICAL CENTER | Age: 69
End: 2024-09-03
Payer: MEDICARE

## 2024-09-03 VITALS
HEART RATE: 83 BPM | SYSTOLIC BLOOD PRESSURE: 116 MMHG | WEIGHT: 184.1 LBS | OXYGEN SATURATION: 96 % | HEIGHT: 69 IN | RESPIRATION RATE: 17 BRPM | DIASTOLIC BLOOD PRESSURE: 64 MMHG | BODY MASS INDEX: 27.27 KG/M2

## 2024-09-03 DIAGNOSIS — E55.9 VITAMIN D DEFICIENCY: ICD-10-CM

## 2024-09-03 DIAGNOSIS — Z96.41 PRESENCE OF INSULIN PUMP: ICD-10-CM

## 2024-09-03 DIAGNOSIS — E03.9 ACQUIRED HYPOTHYROIDISM: ICD-10-CM

## 2024-09-03 DIAGNOSIS — E10.9 CONTROLLED DIABETES MELLITUS TYPE 1 WITHOUT COMPLICATIONS (HCC): ICD-10-CM

## 2024-09-03 DIAGNOSIS — E78.5 DYSLIPIDEMIA: ICD-10-CM

## 2024-09-03 LAB
HBA1C MFR BLD: 6.2 % (ref ?–5.8)
POCT INT CON NEG: NEGATIVE
POCT INT CON POS: POSITIVE

## 2024-09-03 PROCEDURE — 3078F DIAST BP <80 MM HG: CPT

## 2024-09-03 PROCEDURE — 83036 HEMOGLOBIN GLYCOSYLATED A1C: CPT

## 2024-09-03 PROCEDURE — 95249 CONT GLUC MNTR PT PROV EQP: CPT

## 2024-09-03 PROCEDURE — 95251 CONT GLUC MNTR ANALYSIS I&R: CPT

## 2024-09-03 PROCEDURE — 99214 OFFICE O/P EST MOD 30 MIN: CPT

## 2024-09-03 PROCEDURE — 99214 OFFICE O/P EST MOD 30 MIN: CPT | Mod: 25

## 2024-09-03 PROCEDURE — 3074F SYST BP LT 130 MM HG: CPT

## 2024-09-03 ASSESSMENT — FIBROSIS 4 INDEX: FIB4 SCORE: 1.6

## 2024-09-03 NOTE — PROGRESS NOTES
"RN-CDE Note    Subjective:   Endocrinology Clinic Progress Note  PCP: Calos Ingram M.D.    HPI:  Amaury Garner is a 69 y.o. old patient who is seen today by the Diabetes Nurse Specialist for review of his type 1 diabetes on insulin pump.    Recent changes in health: none  DM:   Last A1c:   Lab Results   Component Value Date/Time    HBA1C 6.2 (A) 09/03/2024 08:35 AM      Previous A1c was 6 on 5/30/2024  A1C GOAL: < 7    Diabetes Medications:   Novolog via    Exercise: walking daily  Diet: \"healthy\" diet  in general  Patient's body mass index is 27.19 kg/m². Exercise and nutrition counseling were performed at this visit.    Glucose monitoring frequency:  using Dexcom               Hypoglycemic episodes: cgm data shows blood sugar less than 70 mg /dl 1% of the time.         Last Retinal Exam: on file and up-to-date  Daily Foot Exam: Yes   Foot Exam:  Monofilament: current.   Lab Results   Component Value Date/Time    MALBCRT 14 06/03/2024 07:45 AM    MICROALBUR 13.7 06/03/2024 07:45 AM        ACR Albumin/Creatinine Ratio goal <30     HTN:   Blood pressure goal <130/<80 .   Currently Rx ACE/ARB:  Lisinopril 10 mg daily    Dyslipidemia:    Lab Results   Component Value Date/Time    CHOLSTRLTOT 159 06/03/2024 07:45 AM    LDL 75 06/03/2024 07:45 AM    HDL 78.0 (H) 06/03/2024 07:45 AM    TRIGLYCERIDE 29 06/03/2024 07:45 AM         Currently Rx Statin:  Atorvastatin 40 mg daily    He  reports that he has never smoked. He has never used smokeless tobacco.      Plan:     Discussed and educated on:   - All medications, side effects and compliance (discussed carefully)  - Annual eye examinations at Ophthalmology  - Foot Care:   - HbA1C: target  - Home glucose monitoring emphasized  - Weight control and daily exercise    Recommended medication changes: none    "

## 2024-09-03 NOTE — PROGRESS NOTES
CHIEF COMPLAINT: Patient is here for follow up of Type 1 Diabetes Mellitus    HPI:     Amaury Garner is a 68 y.o. male with Type 1 Diabetes Mellitus here for follow up.  He was previously followed by LORENZO Salinas  He has a history of primary hypothyroidism, and coronary artery disease aside from type 1 diabetes.    Diabetes Type 1  POC A1c on 9/3/24 is 6.2  Labs from 8/24/2023 show a1c is 6.0%  Labs from 5/8/2023 show a1c was 6.1%  Labs from 2/3/2023 show a1c  was 6.3%  Labs from 7/15/2022 show a1c was 6.4%  Labs from 4/20/2021 show a1c was 6.6%  Labs from 10/19/2021 show a1c was 6.7%  Labs from 3/15/2021 show A1c was 7.0%  Labs from 10/6/2020 show a1c was 7.1%  Labs from 9/15/2020 show a1c was 6.7%  Labs from 6/15/2020 show a1c was 7.5%      He is on insulin pump therapy with Tandem pump.   His pump is infusing Novolog  (2 vials per month)   Current pump settings are as follows  Midnight 0.75  5am 0.9  8am 0.825  6pm 0.9    Carb ratio   MN1:6.9  5am 1:5.7  8am 1:6.5  6pm 1:6    Correction   1:35    Target 110      BG Diary:  Weight has been stable            He reports occasional low if taking too much insulin after dinner    Diabetes Complications   Retinopathy: Known retinopathy.  Last eye exam: 1/2024 (Claudia)  And we have records he does not have severe diabetic retinopathy  Neuropathy: Denies paresthesias or numbness in hands or feet. Denies any foot wounds.  Exercise: Minimal.  Diet: Fair.      Hyperlipidemia  He  is taking Lipitor 40 mg daily  He does have CAD  He is tolerating both very well.    Latest Reference Range & Units 06/03/24 07:45   Cholesterol,Tot 120 - 200 mg/dL 159   Triglycerides 0 - 150 mg/dL 29   HDL 40.0 - 60.0 mg/dL 78.0 (H)   Non HDL Cholesterol 30 - 160  81   LDL <100 mg/dL 75   Chol-Hdl Ratio  2.04       He does not have diabetic kidney disease or microalbuminuria   Latest Reference Range & Units 06/03/24 07:45   Micro Alb Creat Ratio 0 - 30 ug/mg 14   Creatinine, Urine mg/dL 97    Microalbumin, Urine Random 0.0 - 30.0 mg/L 13.7     Hypothyroidism  He  is taking Levothyroxine 75 MCG daily.    He denies  palpitations, tremors, cold intolerance and heat intolerance.   Latest Reference Range & Units 06/03/24 07:45   TSH 0.36 - 3.74 uIU/mL 1.90   Free T-4 0.76 - 1.46 ng/dL 0.87       Patient's medications, allergies, and social histories were reviewed and updated as appropriate.    ROS:     CONS:     No fever, no chills   EYES:     No diplopia, no blurry vision   CV:           No chest pain, no palpitations   PULM:     No SOB, no cough, no hemoptysis.   GI:            No nausea, no vomiting, no diarrhea, no constipation   ENDO:     No polyuria, no polydipsia, no heat intolerance, no cold intolerance       Past Medical History:  Problem List:  2021-04: Eczema  2021-04: History of basal cell cancer  2021-04: Xerosis of skin  2020-06: Hashimoto's thyroiditis  2019-09: Primary osteoarthritis of right knee  2019-09: Synovial cyst of right knee  2017-08: Non-seasonal allergic rhinitis due to pollen  2017-05: History of colonic polyps  2017-02: Coronary artery disease due to lipid rich plaque - s/p PCI   to LAD with recurrent PCI for in stent restenosis   2017-02: Long-term insulin use (HCC)  2017-02: Fitting and adjustment of insulin pump  2017-02: Presence of insulin pump  2017-02: DM type 1 with diabetic mixed hyperlipidemia (HCC)  2017-02: Hypothyroidism  2017-02: Controlled diabetes mellitus type 1 without complications   ([facility])  2017-02: Prostatism  2017-02: Insulin pump in place  2017-02: Stent in LAD - 5/2003 with subsequent cutting balloon   angioplasty 2004 repeat angio 2008 in setting of abnormal stress   showed patent stent  Dyslipidemia      Past Surgical History:  Past Surgical History:   Procedure Laterality Date    ME COLONOSCOPY,DIAGNOSTIC N/A 3/17/2020    Procedure: COLONOSCOPY;  Surgeon: Julius Batista M.D.;  Location: SURGERY [facility];  Service: Gen Robotic     "    Allergies:  Patient has no known allergies.     Social History:  Social History     Tobacco Use    Smoking status: Never    Smokeless tobacco: Never   Vaping Use    Vaping status: Never Used   Substance Use Topics    Alcohol use: Yes     Comment: 1 glass of scotch nightly    Drug use: No        Family History:   family history includes Alzheimer's Disease in his mother; Cancer in his father.      PHYSICAL EXAM:   OBJECTIVE:  Vital signs: /64 (BP Location: Right arm, Patient Position: Sitting)   Pulse 83   Resp 17   Ht 1.753 m (5' 9\")   Wt 83.5 kg (184 lb 1.6 oz)   SpO2 96%   BMI 27.19 kg/m²   GENERAL: Well-developed, well-nourished in no apparent distress.   EYE:  No ocular asymmetry, PERRLA  HENT: Pink, moist mucous membranes.    NECK: No thyromegaly.   CARDIOVASCULAR:  No murmurs  LUNGS: Clear breath sounds  ABDOMEN: Soft, nontender   EXTREMITIES: No clubbing, cyanosis, or edema.   NEUROLOGICAL: No gross focal motor abnormalities   LYMPH: No cervical adenopathy palpated.   SKIN: No rashes, lesions.       Labs:  Lab Results   Component Value Date/Time    HBA1C 6.2 (A) 09/03/2024 08:35 AM        Lab Results   Component Value Date/Time    WBC 4.6 (L) 10/24/2023 10:26 AM    RBC 4.64 (L) 10/24/2023 10:26 AM    HEMOGLOBIN 14.6 10/24/2023 10:26 AM    MCV 94.4 (H) 10/24/2023 10:26 AM    MCH 31.5 (H) 10/24/2023 10:26 AM    MCHC 33.3 10/24/2023 10:26 AM    RDW 14.0 10/24/2023 10:26 AM    MPV 10.1 10/24/2023 10:26 AM       Lab Results   Component Value Date/Time    SODIUM 140 06/03/2024 07:45 AM    POTASSIUM 4.6 06/03/2024 07:45 AM    CHLORIDE 106 06/03/2024 07:45 AM    CO2 28 06/03/2024 07:45 AM    ANION 11 06/03/2024 07:45 AM    GLUCOSE 82 06/03/2024 07:45 AM    BUN 26 (H) 06/03/2024 07:45 AM    CREATININE 1.1 06/03/2024 07:45 AM    CALCIUM 8.2 (L) 06/03/2024 07:45 AM    ASTSGOT 25 06/03/2024 07:45 AM    ALTSGPT 26 06/03/2024 07:45 AM    TBILIRUBIN 0.6 06/03/2024 07:45 AM    ALBUMIN 3.2 (L) 06/03/2024 07:45 " AM    TOTPROTEIN 6.7 06/03/2024 07:45 AM    AGRATIO 0.9 06/03/2024 07:45 AM       Lab Results   Component Value Date/Time    CHOLSTRLTOT 138 10/16/2019 0825    TRIGLYCERIDE 35 10/16/2019 0825    HDL 65.0 (H) 10/16/2019 0825    LDL 66 10/16/2019 0825    CHOLHDLRAT 2.12 10/16/2019 0825    NONHDL 73 10/16/2019 0825       Lab Results   Component Value Date/Time    MALBCRT 14 06/03/2024 07:45 AM    MICROALBUR 13.7 06/03/2024 07:45 AM        Lab Results   Component Value Date/Time    TSHULTRASEN 2.10 04/17/2019 0757     No results found for: FREEDIR  No results found for: FREET3  No results found for: THYSTIMIG        ASSESSMENT/PLAN:   1. Controlled diabetes mellitus type 1 without complications (HCC)  Stable  A1c 6.2  Medication:  Changes made to pump as follows  8am basal 0.825 - 0.827  CGM downloaded reviewed  - POCT Hemoglobin A1C  - Comp Metabolic Panel; Future    2. Acquired hypothyroidism  Controlled  Continue current levothyroxine dose 75 MCG daily  Repeat TSH in 3-6 months  - TSH; Future  - FREE THYROXINE; Future    3. Dyslipidemia  Controlled  Continue atorvastatin  Repeat fasting lipids in 3 months    4. Presence of insulin pump  Insulin pump is in place    5. Vitamin D deficiency  Stable  Recommend vitamin D supplement every other day  - VITAMIN D,25 HYDROXY (DEFICIENCY); Future     Disposition:   Return in about 6 months (around 3/3/2025). Patient will have blood work done prior to follow up.       Thank you kindly for allowing me to participate in the diabetes care plan for this patient.    Daniel Corley, APRN   9/3/24    CC:   Calos Ingram M.D.

## 2024-10-29 ENCOUNTER — TELEPHONE (OUTPATIENT)
Dept: ENDOCRINOLOGY | Facility: MEDICAL CENTER | Age: 69
End: 2024-10-29
Payer: MEDICARE

## 2024-12-03 NOTE — PROGRESS NOTES
"RN-CDE Note    Subjective:   Endocrinology Clinic Progress Note  PCP: Calos Ingram M.D.    HPI:  Amaury Garner is a 69 y.o. old patient who is seen today by the Diabetes Nurse Specialist for review of his controlled type 1 diabetes on insulin pump therapy.    Recent changes in health:  no changes in health.  Feels blood sugars running a little higher lately  DM:   Last A1c:   Lab Results   Component Value Date/Time    HBA1C 6.0 (A) 12/04/2024 01:17 PM      Previous A1c was 6.2 on 9/3/2024  A1C GOAL: < 7    Diabetes Medications:   Novolog via pump           Exercise: walking 1.5-2 miles per day.  Resistance exercises a couple times a week.   Diet: \"healthy\" diet  in general  Patient's body mass index is 27.62 kg/m². Exercise and nutrition counseling were performed at this visit.    Glucose monitoring frequency:  using Dexcom CGM    Hypoglycemic episodes: cgm data shows blood sugars less than 70 mg/dl <2% of the time.      Last Retinal Exam: on file and up-to-date  Daily Foot Exam: Yes   Foot Exam:  Monofilament: current.   Lab Results   Component Value Date/Time    MALBCRT 14 06/03/2024 07:45 AM    MICROALBUR 13.7 06/03/2024 07:45 AM        ACR Albumin/Creatinine Ratio goal <30     HTN:   Blood pressure goal <130/<80   Currently Rx ACE/ARB:  Lisinopril 10 mg daily    Dyslipidemia:    Lab Results   Component Value Date/Time    CHOLSTRLTOT 159 06/03/2024 07:45 AM    LDL 75 06/03/2024 07:45 AM    HDL 78.0 (H) 06/03/2024 07:45 AM    TRIGLYCERIDE 29 06/03/2024 07:45 AM         Currently Rx Statin:  Atorvastatin 40 mg daily    He  reports that he has never smoked. He has never used smokeless tobacco.    Plan:     Discussed and educated on:   - All medications, side effects and compliance (discussed carefully)  - HbA1C: target  - Home glucose monitoring emphasized  - Weight control and daily exercise    Recommended medication changes:  no changes.      "

## 2024-12-04 ENCOUNTER — OFFICE VISIT (OUTPATIENT)
Dept: ENDOCRINOLOGY | Facility: MEDICAL CENTER | Age: 69
End: 2024-12-04
Attending: INTERNAL MEDICINE
Payer: MEDICARE

## 2024-12-04 VITALS
RESPIRATION RATE: 17 BRPM | HEIGHT: 69 IN | SYSTOLIC BLOOD PRESSURE: 126 MMHG | HEART RATE: 58 BPM | BODY MASS INDEX: 27.7 KG/M2 | WEIGHT: 187 LBS | DIASTOLIC BLOOD PRESSURE: 68 MMHG | OXYGEN SATURATION: 97 %

## 2024-12-04 DIAGNOSIS — E06.3 HASHIMOTO'S THYROIDITIS: ICD-10-CM

## 2024-12-04 DIAGNOSIS — E78.5 DYSLIPIDEMIA: ICD-10-CM

## 2024-12-04 DIAGNOSIS — E55.9 VITAMIN D DEFICIENCY: ICD-10-CM

## 2024-12-04 DIAGNOSIS — Z79.4 LONG-TERM INSULIN USE (HCC): ICD-10-CM

## 2024-12-04 DIAGNOSIS — Z96.41 PRESENCE OF INSULIN PUMP: ICD-10-CM

## 2024-12-04 DIAGNOSIS — E10.9 CONTROLLED DIABETES MELLITUS TYPE 1 WITHOUT COMPLICATIONS (HCC): ICD-10-CM

## 2024-12-04 DIAGNOSIS — E03.9 ACQUIRED HYPOTHYROIDISM: ICD-10-CM

## 2024-12-04 LAB
HBA1C MFR BLD: 6 % (ref ?–5.8)
POCT INT CON NEG: NEGATIVE
POCT INT CON POS: POSITIVE

## 2024-12-04 PROCEDURE — 99214 OFFICE O/P EST MOD 30 MIN: CPT | Performed by: INTERNAL MEDICINE

## 2024-12-04 PROCEDURE — 3074F SYST BP LT 130 MM HG: CPT | Performed by: INTERNAL MEDICINE

## 2024-12-04 PROCEDURE — 95249 CONT GLUC MNTR PT PROV EQP: CPT | Performed by: INTERNAL MEDICINE

## 2024-12-04 PROCEDURE — 95251 CONT GLUC MNTR ANALYSIS I&R: CPT | Performed by: INTERNAL MEDICINE

## 2024-12-04 PROCEDURE — 83036 HEMOGLOBIN GLYCOSYLATED A1C: CPT | Performed by: INTERNAL MEDICINE

## 2024-12-04 PROCEDURE — 99212 OFFICE O/P EST SF 10 MIN: CPT | Mod: 25 | Performed by: INTERNAL MEDICINE

## 2024-12-04 PROCEDURE — 3078F DIAST BP <80 MM HG: CPT | Performed by: INTERNAL MEDICINE

## 2024-12-04 ASSESSMENT — FIBROSIS 4 INDEX: FIB4 SCORE: 1.44

## 2024-12-04 NOTE — PROGRESS NOTES
CHIEF COMPLAINT: Patient is here for follow up of Type 1 Diabetes Mellitus    HPI:     Amaury Garner is a 69 y.o. male with Type 1 Diabetes Mellitus here for follow up.    Labs from 12/4/2024 show A1c is 6.0%  Labs from 8/24/2023 show a1c is 6.0%  Labs from 5/8/2023 show a1c was 6.1%  Labs from 2/3/2023 show a1c  was 6.3%  Labs from 7/15/2022 show a1c was 6.4%  Labs from 4/20/2021 show a1c was 6.6%  Labs from 10/19/2021 show a1c was 6.7%  Labs from 3/15/2021 show A1c was 7.0%  Labs from 10/6/2020 show a1c was 7.1%  Labs from 9/15/2020 show a1c was 6.7%  Labs from 6/15/2020 show a1c was 7.5%      He was previously followed by LORENZO Salinas  He has a history of primary hypothyroidism, and coronary artery disease aside from type 1 diabetes.  He was on Medtronic 670 G pump.  His insurance does not cover the Guardian sensor thus he is using a Dexcom G7 CGM.      HE finally switched to Tandem x2 pump with Dexcom G7 CGM      His pump is infusing Novolog  (2 vials per month)  Current pump settings are as follows:    Midnight 0.500 units/h  5 AM 0.950 units/h  8 AM 0.900 units/h  12 noon 0800  6 PM 0.900 units/h  Total basal 19    Insulin to carbohydrate ratio   midnight 6.9  5:00 AM 5.7  11 AM 7.5  6 PM 6.0-------------> changed to 5pm 5.0    Sensitivity 35,    Target blood sugar   midnight 110      He likes the Tandem pump        Download of his CGM today shows  spikes post dinner          He has hyperlipidemia and  He  is taking Lipitor 40 mg daily  He does have CAD with prior PCI with stenting of LAD 5/25/2003  He is tolerating both very well.    Latest Reference Range & Units 06/03/24 07:45   Cholesterol,Tot 120 - 200 mg/dL 159   Triglycerides 0 - 150 mg/dL 29   HDL 40.0 - 60.0 mg/dL 78.0 (H)   Non HDL Cholesterol 30 - 160  81   LDL <100 mg/dL 75       He does not have diabetic kidney disease or microalbuminuria   Latest Reference Range & Units 06/03/24 07:45   Micro Alb Creat Ratio 0 - 30 ug/mg 14   Creatinine, Urine  mg/dL 97   Microalbumin, Urine Random 0.0 - 30.0 mg/L 13.7         He had an eye exam on January 2024  And we have records he does not have severe diabetic retinopathy      He has hypothyroidism and is taking Levothyroxine 75 MCG daily.    He denies  palpitations, tremors, cold intolerance and heat intolerance.  His TSH is 2.47 on 11/2024  His TSH is 2.1 on 2/2024  His TSH is 1.9 on 5/2023  His TSH was 2.24 on Jan 2022        BG Diary:  Please see Dexcom G6 CGM download    Weight has been stable    Diabetes Complications   Retinopathy: Known retinopathy.  Last eye exam: 1/2024 ([facility])  Neuropathy: Denies paresthesias or numbness in hands or feet. Denies any foot wounds.  Exercise: Minimal.  Diet: Fair.  Patient's medications, allergies, and social histories were reviewed and updated as appropriate.    ROS:     CONS:     No fever, no chills   EYES:     No diplopia, no blurry vision   CV:           No chest pain, no palpitations   PULM:     No SOB, no cough, no hemoptysis.   GI:            No nausea, no vomiting, no diarrhea, no constipation   ENDO:     No polyuria, no polydipsia, no heat intolerance, no cold intolerance       Past Medical History:  Problem List:  2021-04: Eczema  2021-04: History of basal cell cancer  2021-04: Xerosis of skin  2020-06: Hashimoto's thyroiditis  2019-09: Primary osteoarthritis of right knee  2019-09: Synovial cyst of right knee  2017-08: Non-seasonal allergic rhinitis due to pollen  2017-05: History of colonic polyps  2017-02: Coronary artery disease due to lipid rich plaque - s/p PCI   to LAD with recurrent PCI for in stent restenosis   2017-02: Long-term insulin use (HCC)  2017-02: Fitting and adjustment of insulin pump  2017-02: Presence of insulin pump  2017-02: DM type 1 with diabetic mixed hyperlipidemia (HCC)  2017-02: Hypothyroidism  2017-02: Controlled diabetes mellitus type 1 without complications   (HCC)  2017-02: Prostatism  2017-02: Insulin pump in place  2017-02:  "Stent in LAD - 5/2003 with subsequent cutting balloon   angioplasty 2004 repeat angio 2008 in setting of abnormal stress   showed patent stent  Dyslipidemia      Past Surgical History:  Past Surgical History:   Procedure Laterality Date    VA COLONOSCOPY,DIAGNOSTIC N/A 3/17/2020    Procedure: COLONOSCOPY;  Surgeon: Julius Batista M.D.;  Location: SURGERY [facility];  Service: Gen Robotic        Allergies:  Patient has no known allergies.     Social History:  Social History     Tobacco Use    Smoking status: Never    Smokeless tobacco: Never   Vaping Use    Vaping status: Never Used   Substance Use Topics    Alcohol use: Yes     Comment: 1 glass of scotch nightly    Drug use: No        Family History:   family history includes Alzheimer's Disease in his mother; Cancer in his father.      PHYSICAL EXAM:   OBJECTIVE:  Vital signs: /68 (BP Location: Left arm, Patient Position: Sitting)   Pulse (!) 58   Resp 17   Ht 1.753 m (5' 9\")   Wt 84.8 kg (187 lb)   SpO2 97%   BMI 27.62 kg/m²   GENERAL: Well-developed, well-nourished in no apparent distress.   EYE:  No ocular asymmetry, PERRLA  HENT: Pink, moist mucous membranes.    NECK: No thyromegaly.   CARDIOVASCULAR:  No murmurs  LUNGS: Clear breath sounds  ABDOMEN: Soft, nontender   EXTREMITIES: No clubbing, cyanosis, or edema.   NEUROLOGICAL: No gross focal motor abnormalities   LYMPH: No cervical adenopathy palpated.   SKIN: No rashes, lesions.       Labs:  Lab Results   Component Value Date/Time    HBA1C 6.0 (A) 12/04/2024 01:17 PM        Lab Results   Component Value Date/Time    WBC 4.6 (L) 10/24/2023 10:26 AM    RBC 4.64 (L) 10/24/2023 10:26 AM    HEMOGLOBIN 14.6 10/24/2023 10:26 AM    MCV 94.4 (H) 10/24/2023 10:26 AM    MCH 31.5 (H) 10/24/2023 10:26 AM    MCHC 33.3 10/24/2023 10:26 AM    RDW 14.0 10/24/2023 10:26 AM    MPV 10.1 10/24/2023 10:26 AM       Lab Results   Component Value Date/Time    SODIUM 139 11/25/2024 07:15 AM    POTASSIUM 4.2 " 11/25/2024 07:15 AM    CHLORIDE 107 11/25/2024 07:15 AM    CO2 27 11/25/2024 07:15 AM    ANION 9 (L) 11/25/2024 07:15 AM    GLUCOSE 111 (H) 11/25/2024 07:15 AM    BUN 31 (H) 11/25/2024 07:15 AM    CREATININE 1.1 11/25/2024 07:15 AM    CALCIUM 8.8 11/25/2024 07:15 AM    ASTSGOT 22 11/25/2024 07:15 AM    ALTSGPT 25 11/25/2024 07:15 AM    TBILIRUBIN 0.7 11/25/2024 07:15 AM    ALBUMIN 3.6 11/25/2024 07:15 AM    TOTPROTEIN 7.1 11/25/2024 07:15 AM    AGRATIO 1.0 11/25/2024 07:15 AM       Lab Results   Component Value Date/Time    CHOLSTRLTOT 138 10/16/2019 0825    TRIGLYCERIDE 35 10/16/2019 0825    HDL 65.0 (H) 10/16/2019 0825    LDL 66 10/16/2019 0825    CHOLHDLRAT 2.12 10/16/2019 0825    NONHDL 73 10/16/2019 0825       Lab Results   Component Value Date/Time    MALBCRT 14 06/03/2024 07:45 AM    MICROALBUR 13.7 06/03/2024 07:45 AM        Lab Results   Component Value Date/Time    TSHULTRASEN 2.10 04/17/2019 0757     No results found for: FREEDIR  No results found for: FREET3  No results found for: THYSTIMIG        ASSESSMENT/PLAN:     1. Controlled diabetes mellitus type 1 without complications (HCC)  A1c is stable at 6.0%  Therapeutic CGM was downloaded and reviewed  Changed his dinner carb ratio to 5.0 due to post dinner spikes  Follow-up with the office in 3 months with labs      2. Acquired hypothyroidism  Controlled  Continue current levothyroxine dose 75 MCG daily  Repeat TSH in 3-6 months    3. Hashimoto's disease  This is the likely etiology of his hypothyroidism.    10% of individuals with Hashimoto's have negative antibodies    4. Dyslipidemia  Controlled  Continue atorvastatin  Repeat fasting lipids in 3 months      5. Presence of insulin pump  Insulin pump is in place    6. Encounter for long-term (current) use of insulin (HCC)  Patient is on long-term insulin therapy for type 1 diabetes      Disposition: see NP for 3 month follow up due to medicare patient needing closer follow up to approve supplies per  Medicare rules      Return in about 3 months (around 3/4/2025).      Thank you kindly for allowing me to participate in the diabetes care plan for this patient.    Favio Bowens MD, [facility], [facility]    CC:   Calos Ingram M.D.

## 2025-01-31 DIAGNOSIS — E10.9 CONTROLLED DIABETES MELLITUS TYPE 1 WITHOUT COMPLICATIONS (HCC): ICD-10-CM

## 2025-01-31 RX ORDER — INSULIN ASPART 100 [IU]/ML
INJECTION, SOLUTION INTRAVENOUS; SUBCUTANEOUS
Qty: 50 ML | Refills: 3 | Status: SHIPPED | OUTPATIENT
Start: 2025-01-31

## 2025-03-03 NOTE — PROGRESS NOTES
"RN-CDE Note    Subjective:   Endocrinology Clinic Progress Note  PCP: Calos Ingram M.D.    HPI:  Amaury Garner is a 69 y.o. old patient who is seen today by the Diabetes Nurse Specialist for review of his controlled type 1 diabetes on insulin pump therapy.    Recent changes in health: denies any changes.   DM:   Last A1c:   Lab Results   Component Value Date/Time    HBA1C 6.0 (A) 03/04/2025 11:00 AM      Previous A1c was 6 on 12/4/2024  A1C GOAL: < 7    Diabetes Medications:   Novolog via Tandem Pump         Exercise: walking daily for 2 miles, and exercising daily for 30-45 minutes (push ups and weight training)   Diet: \"healthy\" diet  in general  Patient's body mass index is 27.78 kg/m². Exercise and nutrition counseling were performed at this visit.    Glucose monitoring frequency:  using Dexcom with pump    Hypoglycemic episodes: on occasion, less than 2% of the time.      Last Retinal Exam: on file and up-to-date     Foot Exam:  Monofilament: current.   Lab Results   Component Value Date/Time    MALBCRT 3 02/25/2025 06:59 AM    MICROALBUR 2.9 02/25/2025 06:59 AM        ACR Albumin/Creatinine Ratio goal <30     HTN:   Blood pressure goal <130/<80   Currently Rx ACE/ARB:  Lisinopril 10 mg daily    Dyslipidemia:    Lab Results   Component Value Date/Time    CHOLSTRLTOT 175 02/25/2025 06:59 AM    LDL 82 02/25/2025 06:59 AM    HDL 87.0 (H) 02/25/2025 06:59 AM    TRIGLYCERIDE 28 02/25/2025 06:59 AM         Currently Rx Statin:  Atorvastatin 40 mg daily    He  reports that he has never smoked. He has never used smokeless tobacco.    Plan:     Discussed and educated on:   - All medications, side effects and compliance (discussed carefully)  - HbA1C: target  - Home glucose monitoring emphasized  - Weight control and daily exercise    Recommended medication changes: none     "

## 2025-03-04 ENCOUNTER — OFFICE VISIT (OUTPATIENT)
Dept: ENDOCRINOLOGY | Facility: MEDICAL CENTER | Age: 70
End: 2025-03-04
Payer: MEDICARE

## 2025-03-04 VITALS
DIASTOLIC BLOOD PRESSURE: 58 MMHG | HEART RATE: 60 BPM | HEIGHT: 69 IN | BODY MASS INDEX: 27.86 KG/M2 | WEIGHT: 188.1 LBS | OXYGEN SATURATION: 97 % | SYSTOLIC BLOOD PRESSURE: 112 MMHG

## 2025-03-04 DIAGNOSIS — Z96.41 PRESENCE OF INSULIN PUMP: ICD-10-CM

## 2025-03-04 DIAGNOSIS — E78.5 DYSLIPIDEMIA: ICD-10-CM

## 2025-03-04 DIAGNOSIS — Z79.4 LONG-TERM INSULIN USE (HCC): ICD-10-CM

## 2025-03-04 DIAGNOSIS — E10.9 CONTROLLED DIABETES MELLITUS TYPE 1 WITHOUT COMPLICATIONS (HCC): ICD-10-CM

## 2025-03-04 DIAGNOSIS — E55.9 VITAMIN D DEFICIENCY: ICD-10-CM

## 2025-03-04 DIAGNOSIS — E03.9 ACQUIRED HYPOTHYROIDISM: ICD-10-CM

## 2025-03-04 LAB
HBA1C MFR BLD: 6 % (ref ?–5.8)
POCT INT CON NEG: NEGATIVE
POCT INT CON POS: POSITIVE

## 2025-03-04 PROCEDURE — 3078F DIAST BP <80 MM HG: CPT

## 2025-03-04 PROCEDURE — 95249 CONT GLUC MNTR PT PROV EQP: CPT

## 2025-03-04 PROCEDURE — 3074F SYST BP LT 130 MM HG: CPT

## 2025-03-04 PROCEDURE — 83036 HEMOGLOBIN GLYCOSYLATED A1C: CPT

## 2025-03-04 PROCEDURE — 99213 OFFICE O/P EST LOW 20 MIN: CPT | Mod: 25

## 2025-03-04 PROCEDURE — 95251 CONT GLUC MNTR ANALYSIS I&R: CPT

## 2025-03-04 PROCEDURE — 99214 OFFICE O/P EST MOD 30 MIN: CPT

## 2025-03-04 ASSESSMENT — FIBROSIS 4 INDEX: FIB4 SCORE: 1.52

## 2025-03-04 NOTE — PROGRESS NOTES
CHIEF COMPLAINT: Patient is here for follow up of Type 1 Diabetes Mellitus    HPI:     Amaury Garner is a 69 y.o. male with Type 1 Diabetes Mellitus here for follow up.    Labs from 3/4/25 is 6.0  Labs from 12/4/2024 show A1c is 6.0%  Labs from 8/24/2023 show a1c is 6.0%  Labs from 5/8/2023 show a1c was 6.1%  Labs from 2/3/2023 show a1c  was 6.3%  Labs from 7/15/2022 show a1c was 6.4%  Labs from 4/20/2021 show a1c was 6.6%  Labs from 10/19/2021 show a1c was 6.7%  Labs from 3/15/2021 show A1c was 7.0%  Labs from 10/6/2020 show a1c was 7.1%  Labs from 9/15/2020 show a1c was 6.7%  Labs from 6/15/2020 show a1c was 7.5%      He was previously followed by LORENZO Salinas  He has a history of primary hypothyroidism, and coronary artery disease aside from type 1 diabetes.  He was on Medtronic 670 G pump.  His insurance does not cover the Guardian sensor thus he is using a Dexcom G7 CGM.      HE finally switched to Tandem x2 pump with Dexcom G7 CGM      His pump is infusing Novolog  (2 vials per month)  Current pump settings are as follows:    He likes the Tandem pump    Download of his CGM today shows  spikes post dinner            Weight has been stable    Diabetes Complications   Retinopathy: Known retinopathy.  Last eye exam: 2/2025 ([facility]audia)  Neuropathy: Denies paresthesias or numbness in hands or feet.   Denies any foot wounds.  Exercise: Minimal.  Diet: Fair.    He has hyperlipidemia and  He  is taking Lipitor 40 mg daily  He does have CAD with prior PCI with stenting of LAD 5/25/2003  He is tolerating both very well.    Latest Reference Range & Units 02/25/25 06:59   Cholesterol,Tot 120 - 200 mg/dL 175   Triglycerides 0 - 150 mg/dL 28   HDL 40.0 - 60.0 mg/dL 87.0 (H)   Non HDL Cholesterol 30 - 160  88   LDL <100 mg/dL 82   Chol-Hdl Ratio  2.01       He does not have diabetic kidney disease or microalbuminuria   Latest Reference Range & Units 02/25/25 06:59   Micro Alb Creat Ratio 0 - 30 ug/mg 3   Creatinine, Urine  mg/dL 102   Microalbumin, Urine Random 0.0 - 30.0 mg/L 2.9             He has hypothyroidism and is taking Levothyroxine 75 MCG daily.    He denies  palpitations, tremors, cold intolerance and heat intolerance.   Latest Reference Range & Units 02/25/25 06:59   TSH 0.36 - 3.74 uIU/mL 3.10   Free T-4 0.76 - 1.46 ng/dL 0.82     His TSH is 2.47 on 11/2024  His TSH is 2.1 on 2/2024  His TSH is 1.9 on 5/2023  His TSH was 2.24 on Jan 2022    Currently taking vitamin D daily   Latest Reference Range & Units 02/25/25 06:59   25-Hydroxy   Vitamin D 25 30 - 100 ng/mL 93         Patient's medications, allergies, and social histories were reviewed and updated as appropriate.    ROS:     CONS:     No fever, no chills   EYES:     No diplopia, no blurry vision   CV:           No chest pain, no palpitations   PULM:     No SOB, no cough, no hemoptysis.   GI:            No nausea, no vomiting, no diarrhea, no constipation   ENDO:     No polyuria, no polydipsia, no heat intolerance, no cold intolerance       Past Medical History:  Problem List:  2021-04: Eczema  2021-04: History of basal cell cancer  2021-04: Xerosis of skin  2020-06: Hashimoto's thyroiditis  2019-09: Primary osteoarthritis of right knee  2019-09: Synovial cyst of right knee  2017-08: Non-seasonal allergic rhinitis due to pollen  2017-05: History of colonic polyps  2017-02: Coronary artery disease due to lipid rich plaque - s/p PCI   to LAD with recurrent PCI for in stent restenosis   2017-02: Long-term insulin use (HCC)  2017-02: Fitting and adjustment of insulin pump  2017-02: Presence of insulin pump  2017-02: DM type 1 with diabetic mixed hyperlipidemia (HCC)  2017-02: Hypothyroidism  2017-02: Controlled diabetes mellitus type 1 without complications   ([facility])  2017-02: Prostatism  2017-02: Insulin pump in place  2017-02: Stent in LAD - 5/2003 with subsequent cutting balloon   angioplasty 2004 repeat angio 2008 in setting of abnormal stress   showed patent  "stent  Dyslipidemia      Past Surgical History:  Past Surgical History:   Procedure Laterality Date    CT COLONOSCOPY-FLEXIBLE N/A 3/17/2020    Procedure: COLONOSCOPY;  Surgeon: Julius Batista M.D.;  Location: SURGERY [facility];  Service: Gen Robotic        Allergies:  Patient has no known allergies.     Social History:  Social History     Tobacco Use    Smoking status: Never    Smokeless tobacco: Never   Vaping Use    Vaping status: Never Used   Substance Use Topics    Alcohol use: Yes     Comment: 1 glass of scotch nightly    Drug use: No        Family History:   family history includes Alzheimer's Disease in his mother; Cancer in his father.      PHYSICAL EXAM:   OBJECTIVE:  Vital signs: /58   Pulse 60   Ht 1.753 m (5' 9\")   Wt 85.3 kg (188 lb 1.6 oz)   SpO2 97%   BMI 27.78 kg/m²   GENERAL: Well-developed, well-nourished in no apparent distress.   EYE:  No ocular asymmetry, PERRLA  HENT: Pink, moist mucous membranes.    NECK: No thyromegaly.   CARDIOVASCULAR:  No murmurs  LUNGS: Clear breath sounds  ABDOMEN: Soft, nontender   EXTREMITIES: No clubbing, cyanosis, or edema.   NEUROLOGICAL: No gross focal motor abnormalities   LYMPH: No cervical adenopathy palpated.   SKIN: No rashes, lesions.       Labs:  Lab Results   Component Value Date/Time    HBA1C 6.0 (A) 03/04/2025 11:00 AM        Lab Results   Component Value Date/Time    WBC 4.6 (L) 10/24/2023 10:26 AM    RBC 4.64 (L) 10/24/2023 10:26 AM    HEMOGLOBIN 14.6 10/24/2023 10:26 AM    MCV 94.4 (H) 10/24/2023 10:26 AM    MCH 31.5 (H) 10/24/2023 10:26 AM    MCHC 33.3 10/24/2023 10:26 AM    RDW 14.0 10/24/2023 10:26 AM    MPV 10.1 10/24/2023 10:26 AM       Lab Results   Component Value Date/Time    SODIUM 140 02/25/2025 06:59 AM    POTASSIUM 4.4 02/25/2025 06:59 AM    CHLORIDE 104 02/25/2025 06:59 AM    CO2 27 02/25/2025 06:59 AM    ANION 13 02/25/2025 06:59 AM    GLUCOSE 192 (H) 02/25/2025 06:59 AM    BUN 24 (H) 02/25/2025 06:59 AM    CREATININE " 1.1 02/25/2025 06:59 AM    CALCIUM 8.6 02/25/2025 06:59 AM    ASTSGOT 25 02/25/2025 06:59 AM    ALTSGPT 29 02/25/2025 06:59 AM    TBILIRUBIN 0.7 02/25/2025 06:59 AM    ALBUMIN 3.5 02/25/2025 06:59 AM    TOTPROTEIN 7.1 02/25/2025 06:59 AM    AGRATIO 1.0 02/25/2025 06:59 AM       Lab Results   Component Value Date/Time    CHOLSTRLTOT 138 10/16/2019 0825    TRIGLYCERIDE 35 10/16/2019 0825    HDL 65.0 (H) 10/16/2019 0825    LDL 66 10/16/2019 0825    CHOLHDLRAT 2.12 10/16/2019 0825    NONHDL 73 10/16/2019 0825       Lab Results   Component Value Date/Time    MALBCRT 3 02/25/2025 06:59 AM    MICROALBUR 2.9 02/25/2025 06:59 AM        Lab Results   Component Value Date/Time    TSHULTRASEN 2.10 04/17/2019 0757     No results found for: FREEDIR  No results found for: FREET3  No results found for: THYSTIMIG        ASSESSMENT/PLAN:   1. Controlled diabetes mellitus type 1 without complications (HCC)  A1c is stable at 6.0%  Therapeutic CGM was downloaded and reviewed  No changes made to pump settings  Follow-up with the office in 3 months with labs  - POCT Hemoglobin A1C  - Comp Metabolic Panel; Future    2. Acquired hypothyroidism  Controlled  Continue current levothyroxine dose 75 MCG daily  Repeat TSH in 3-6 months  - TSH; Future  - FREE THYROXINE; Future    3. Dyslipidemia  Controlled  Continue atorvastatin  Repeat fasting lipids in 6 months    4. Presence of insulin pump  Insulin pump is in place    5. Vitamin D deficiency  Unstable  Recommend vitamin D every other day  - VITAMIN D,25 HYDROXY (DEFICIENCY); Future    6. Long-term insulin use (HCC)  Patient is on long-term insulin therapy for type 1 diabetes     Disposition: Return in about 6 months (around 9/4/2025). Patient will have blood work done prior to follow up in three months    Thank you kindly for allowing me to participate in the diabetes care plan for this patient.    LON Laws   3/4/25    CC:   Calos Ingram M.D.

## 2025-04-14 DIAGNOSIS — E10.9 CONTROLLED DIABETES MELLITUS TYPE 1 WITHOUT COMPLICATIONS (HCC): ICD-10-CM

## 2025-05-04 DIAGNOSIS — E10.9 CONTROLLED DIABETES MELLITUS TYPE 1 WITHOUT COMPLICATIONS (HCC): ICD-10-CM

## 2025-05-05 RX ORDER — INSULIN ASPART 100 [IU]/ML
INJECTION, SOLUTION INTRAVENOUS; SUBCUTANEOUS
Qty: 50 ML | Refills: 3 | Status: SHIPPED | OUTPATIENT
Start: 2025-05-05 | End: 2025-05-06 | Stop reason: SDUPTHER

## 2025-05-06 ENCOUNTER — TELEPHONE (OUTPATIENT)
Dept: ENDOCRINOLOGY | Facility: MEDICAL CENTER | Age: 70
End: 2025-05-06
Payer: MEDICARE

## 2025-05-06 DIAGNOSIS — E10.9 CONTROLLED DIABETES MELLITUS TYPE 1 WITHOUT COMPLICATIONS (HCC): ICD-10-CM

## 2025-05-06 NOTE — TELEPHONE ENCOUNTER
Medication Requested: Novolog 100 unit/ML      Insulin pen Or vial? : Vial        Days Supply: 90        [facility] [address] [facility]        Number of Refills: 3

## 2025-05-06 NOTE — TELEPHONE ENCOUNTER
Pt called because they need the directions on their Insulin Rx changed so that way the pharmacy can give them their 90 day supply instead of being only able to give him a 1 month supply.    I confirmed with the patient the medication in question and the pharmacy he wants it sent to and and let him know I would be sending a request to Dr. Bowens to review and then send out to his pharmacy.

## 2025-05-07 RX ORDER — INSULIN ASPART 100 [IU]/ML
INJECTION, SOLUTION INTRAVENOUS; SUBCUTANEOUS
Qty: 50 ML | Refills: 3 | Status: SHIPPED | OUTPATIENT
Start: 2025-05-07 | End: 2025-05-08 | Stop reason: SDUPTHER

## 2025-05-08 ENCOUNTER — TELEPHONE (OUTPATIENT)
Dept: ENDOCRINOLOGY | Facility: MEDICAL CENTER | Age: 70
End: 2025-05-08
Payer: MEDICARE

## 2025-05-08 DIAGNOSIS — E10.9 CONTROLLED DIABETES MELLITUS TYPE 1 WITHOUT COMPLICATIONS (HCC): ICD-10-CM

## 2025-05-08 RX ORDER — INSULIN ASPART 100 [IU]/ML
INJECTION, SOLUTION INTRAVENOUS; SUBCUTANEOUS
Qty: 50 ML | Refills: 3 | Status: SHIPPED | OUTPATIENT
Start: 2025-05-08

## 2025-05-08 NOTE — TELEPHONE ENCOUNTER
Called pt and left voicemail letting him know we got his message and his Rx is currently being processed and should be released to the pharmacy within the next few days.    I left a callback number in case he had any questions or concerns

## 2025-06-11 ENCOUNTER — OFFICE VISIT (OUTPATIENT)
Dept: ENDOCRINOLOGY | Facility: MEDICAL CENTER | Age: 70
End: 2025-06-11
Attending: INTERNAL MEDICINE
Payer: MEDICARE

## 2025-06-11 VITALS
SYSTOLIC BLOOD PRESSURE: 118 MMHG | WEIGHT: 187.8 LBS | HEIGHT: 69 IN | BODY MASS INDEX: 27.81 KG/M2 | HEART RATE: 63 BPM | OXYGEN SATURATION: 97 % | DIASTOLIC BLOOD PRESSURE: 62 MMHG

## 2025-06-11 DIAGNOSIS — Z96.41 PRESENCE OF INSULIN PUMP: ICD-10-CM

## 2025-06-11 DIAGNOSIS — E78.5 DYSLIPIDEMIA: ICD-10-CM

## 2025-06-11 DIAGNOSIS — E03.9 ACQUIRED HYPOTHYROIDISM: ICD-10-CM

## 2025-06-11 DIAGNOSIS — E10.9 CONTROLLED DIABETES MELLITUS TYPE 1 WITHOUT COMPLICATIONS (HCC): Primary | ICD-10-CM

## 2025-06-11 DIAGNOSIS — Z79.4 LONG-TERM INSULIN USE (HCC): ICD-10-CM

## 2025-06-11 DIAGNOSIS — E06.3 HASHIMOTO'S THYROIDITIS: ICD-10-CM

## 2025-06-11 LAB
HBA1C MFR BLD: 6.2 % (ref ?–5.8)
POCT INT CON NEG: NEGATIVE
POCT INT CON POS: POSITIVE

## 2025-06-11 PROCEDURE — 95251 CONT GLUC MNTR ANALYSIS I&R: CPT | Performed by: INTERNAL MEDICINE

## 2025-06-11 PROCEDURE — 99214 OFFICE O/P EST MOD 30 MIN: CPT | Performed by: INTERNAL MEDICINE

## 2025-06-11 PROCEDURE — 95250 CONT GLUC MNTR PHYS/QHP EQP: CPT | Mod: 27 | Performed by: INTERNAL MEDICINE

## 2025-06-11 PROCEDURE — 3074F SYST BP LT 130 MM HG: CPT | Performed by: INTERNAL MEDICINE

## 2025-06-11 PROCEDURE — 83036 HEMOGLOBIN GLYCOSYLATED A1C: CPT | Performed by: INTERNAL MEDICINE

## 2025-06-11 PROCEDURE — 3078F DIAST BP <80 MM HG: CPT | Performed by: INTERNAL MEDICINE

## 2025-06-11 ASSESSMENT — FIBROSIS 4 INDEX: FIB4 SCORE: 1.4

## 2025-06-11 NOTE — PROGRESS NOTES
"RN-CDE Note    Subjective:   Endocrinology Clinic Progress Note  PCP: Calos Ingram M.D.    HPI:  Amaury Garner is a 69 y.o. old patient who is seen today by the Diabetes Nurse Specialist for review of his controlled type 1 diabetes on insulin pump therapy.    Recent changes in health:  denies any changes  DM:   Last A1c:   Lab Results   Component Value Date/Time    HBA1C 6.2 (A) 06/11/2025 03:59 PM      Previous A1c was 6 on 3/4/2025  A1C GOAL: < 7    Diabetes Medications:   Novolog via Tandem Pump          Exercise: 2 mile walk daily plus additional exercises  Diet: \"healthy\" diet  in general  Patient's body mass index is 27.73 kg/m². Exercise and nutrition counseling were performed at this visit.    Glucose monitoring frequency: using CGM with pump        Hypoglycemic episodes: on occasion, cgm shows less than 2% of the time.      Last Retinal Exam: on file and up-to-date  Daily Foot Exam: Yes   Foot Exam:  Monofilament: current.     He  reports that he has never smoked. He has never used smokeless tobacco.    Plan:     Discussed and educated on:   - All medications, side effects and compliance (discussed carefully)  - Annual eye examinations at Ophthalmology  - HbA1C: target  - Home glucose monitoring emphasized  - Weight control and daily exercise    Recommended medication changes: changed 5 am cho ratio to 5.5     "

## 2025-06-11 NOTE — PROGRESS NOTES
CHIEF COMPLAINT: Patient is here for follow up of Type 1 Diabetes Mellitus    HPI:     Amaury aGrner is a 69 y.o. male with Type 1 Diabetes Mellitus here for follow up.      Labs from 6/11/2025 show A1c is 6.2%  Labs from 12/4/2024 show A1c is 6.0%  Labs from 8/24/2023 show a1c is 6.0%  Labs from 5/8/2023 show a1c was 6.1%  Labs from 2/3/2023 show a1c  was 6.3%  Labs from 7/15/2022 show a1c was 6.4%  Labs from 4/20/2021 show a1c was 6.6%  Labs from 10/19/2021 show a1c was 6.7%  Labs from 3/15/2021 show A1c was 7.0%  Labs from 10/6/2020 show a1c was 7.1%  Labs from 9/15/2020 show a1c was 6.7%  Labs from 6/15/2020 show a1c was 7.5%      He was previously followed by LORENZO Salinas  He has a history of primary hypothyroidism, and coronary artery disease aside from type 1 diabetes.  He was on Medtronic 670 G pump.  His insurance does not cover the Guardian sensor thus he is using a Dexcom G7 CGM.      HE finally switched to Tandem x2 pump with Dexcom G7 CGM      His pump is infusing Novolog  (2 vials per month)  Current pump settings are as follows:    Midnight 0.500 units/h  5 AM 0.950 units/h  8 AM 0.900 units/h  12 noon 0800  6 PM 0.900 units/h  Total basal 19    Insulin to carbohydrate ratio   midnight 6.9  5:00 AM 5.7  11 AM 7.5  6 PM 5.0    Sensitivity 35,    Target blood sugar   midnight 110      He likes the Tandem pump        Download of his CGM today shows  spikes post dinner          He has hyperlipidemia and  He  is taking Lipitor 40 mg daily  He does have CAD with prior PCI with stenting of LAD 5/25/2003  He is tolerating both very well.    Latest Reference Range & Units 02/25/25 06:59   Cholesterol,Tot 120 - 200 mg/dL 175   Triglycerides 0 - 150 mg/dL 28   HDL 40.0 - 60.0 mg/dL 87.0 (H)   Non HDL Cholesterol 30 - 160  88   LDL <100 mg/dL 82   Chol-Hdl Ratio  2.01       He does not have diabetic kidney disease or microalbuminuria   Latest Reference Range & Units 02/25/25 06:59   Micro Alb Creat Ratio 0 -  30 ug/mg 3   Creatinine, Urine mg/dL 102   Microalbumin, Urine Random 0.0 - 30.0 mg/L 2.9         He had an eye exam on January 2024  And we have records he does not have severe diabetic retinopathy      He has hypothyroidism and is taking Levothyroxine 75 MCG daily.    He denies  palpitations, tremors, cold intolerance and heat intolerance.  His TSH is 2.11 on 6/2025  His TSH is 2.47 on 11/2024  His TSH is 2.1 on 2/2024  His TSH is 1.9 on 5/2023  His TSH was 2.24 on Jan 2022        BG Diary:  Please see Dexcom G6 CGM download    Weight has been stable    Diabetes Complications   Retinopathy: Known retinopathy.  Last eye exam: 1/2024 (Claudia)  Neuropathy: Denies paresthesias or numbness in hands or feet. Denies any foot wounds.  Exercise: Minimal.  Diet: Fair.  Patient's medications, allergies, and social histories were reviewed and updated as appropriate.    ROS:     CONS:     No fever, no chills   EYES:     No diplopia, no blurry vision   CV:           No chest pain, no palpitations   PULM:     No SOB, no cough, no hemoptysis.   GI:            No nausea, no vomiting, no diarrhea, no constipation   ENDO:     No polyuria, no polydipsia, no heat intolerance, no cold intolerance       Past Medical History:  Problem List:  2021-04: Eczema  2021-04: History of basal cell cancer  2021-04: Xerosis of skin  2020-06: Hashimoto's thyroiditis  2019-09: Primary osteoarthritis of right knee  2019-09: Synovial cyst of right knee  2017-08: Non-seasonal allergic rhinitis due to pollen  2017-05: History of colonic polyps  2017-02: Coronary artery disease due to lipid rich plaque - s/p PCI   to LAD with recurrent PCI for in stent restenosis   2017-02: Long-term insulin use (HCC)  2017-02: Fitting and adjustment of insulin pump  2017-02: Presence of insulin pump  2017-02: DM type 1 with diabetic mixed hyperlipidemia (HCC)  2017-02: Hypothyroidism  2017-02: Controlled diabetes mellitus type 1 without complications  "  ([facility])  2017-02: Prostatism  2017-02: Insulin pump in place  2017-02: Stent in LAD - 5/2003 with subsequent cutting balloon   angioplasty 2004 repeat angio 2008 in setting of abnormal stress   showed patent stent  Dyslipidemia      Past Surgical History:  Past Surgical History:   Procedure Laterality Date    AR COLONOSCOPY-FLEXIBLE N/A 3/17/2020    Procedure: COLONOSCOPY;  Surgeon: Julius Batista M.D.;  Location: SURGERY [facility];  Service: Gen Robotic        Allergies:  Patient has no known allergies.     Social History:  Social History     Tobacco Use    Smoking status: Never    Smokeless tobacco: Never   Vaping Use    Vaping status: Never Used   Substance Use Topics    Alcohol use: Yes     Comment: 1 glass of scotch nightly    Drug use: No        Family History:   family history includes Alzheimer's Disease in his mother; Cancer in his father.      PHYSICAL EXAM:   OBJECTIVE:  Vital signs: /62   Pulse 63   Ht 1.753 m (5' 9\")   Wt 85.2 kg (187 lb 12.8 oz)   SpO2 97%   BMI 27.73 kg/m²   GENERAL: Well-developed, well-nourished in no apparent distress.   EYE:  No ocular asymmetry, PERRLA  HENT: Pink, moist mucous membranes.    NECK: No thyromegaly.   CARDIOVASCULAR:  No murmurs  LUNGS: Clear breath sounds  ABDOMEN: Soft, nontender   EXTREMITIES: No clubbing, cyanosis, or edema.   NEUROLOGICAL: No gross focal motor abnormalities   LYMPH: No cervical adenopathy palpated.   SKIN: No rashes, lesions.       Labs:  Lab Results   Component Value Date/Time    HBA1C 6.2 (A) 06/11/2025 03:59 PM        Lab Results   Component Value Date/Time    WBC 4.6 (L) 10/24/2023 10:26 AM    RBC 4.64 (L) 10/24/2023 10:26 AM    HEMOGLOBIN 14.6 10/24/2023 10:26 AM    MCV 94.4 (H) 10/24/2023 10:26 AM    MCH 31.5 (H) 10/24/2023 10:26 AM    MCHC 33.3 10/24/2023 10:26 AM    RDW 14.0 10/24/2023 10:26 AM    MPV 10.1 10/24/2023 10:26 AM       Lab Results   Component Value Date/Time    SODIUM 139 06/04/2025 07:16 AM    POTASSIUM " 4.7 06/04/2025 07:16 AM    CHLORIDE 106 06/04/2025 07:16 AM    CO2 28 06/04/2025 07:16 AM    ANION 10 06/04/2025 07:16 AM    GLUCOSE 149 (H) 06/04/2025 07:16 AM    BUN 29 (H) 06/04/2025 07:16 AM    CREATININE 1.1 06/04/2025 07:16 AM    CALCIUM 8.6 06/04/2025 07:16 AM    ASTSGOT 23 06/04/2025 07:16 AM    ALTSGPT 29 06/04/2025 07:16 AM    TBILIRUBIN 0.6 06/04/2025 07:16 AM    ALBUMIN 3.4 06/04/2025 07:16 AM    TOTPROTEIN 6.5 06/04/2025 07:16 AM    AGRATIO 1.1 06/04/2025 07:16 AM       Lab Results   Component Value Date/Time    CHOLSTRLTOT 138 10/16/2019 0825    TRIGLYCERIDE 35 10/16/2019 0825    HDL 65.0 (H) 10/16/2019 0825    LDL 66 10/16/2019 0825    CHOLHDLRAT 2.12 10/16/2019 0825    NONHDL 73 10/16/2019 0825       Lab Results   Component Value Date/Time    MALBCRT 3 02/25/2025 06:59 AM    MICROALBUR 2.9 02/25/2025 06:59 AM        Lab Results   Component Value Date/Time    TSHULTRASEN 2.10 04/17/2019 0757     No results found for: FREEDIR  No results found for: FREET3  No results found for: THYSTIMIG        ASSESSMENT/PLAN:     1. Controlled diabetes mellitus type 1 without complications (HCC)  A1c is stable at 6.0%  Therapeutic CGM was downloaded and reviewed  Changed his Bfast carb ratio to 5.5 due to post Bfast  spikes  Follow-up with the office in 3 months with labs      2. Acquired hypothyroidism  Controlled  Continue current levothyroxine dose 75 MCG daily  Repeat TSH in 3-6 months    3. Hashimoto's disease  This is the likely etiology of his hypothyroidism.    10% of individuals with Hashimoto's have negative antibodies    4. Dyslipidemia  Controlled  Continue atorvastatin  Repeat fasting lipids in 12 months      5. Presence of insulin pump  Insulin pump is in place    6. Encounter for long-term (current) use of insulin (HCC)  Patient is on long-term insulin therapy for type 1 diabetes      Disposition: see NP for 3 month follow up due to medicare patient needing closer follow up to approve supplies per  Medicare rules      Return in about 3 months (around 9/11/2025).      Thank you kindly for allowing me to participate in the diabetes care plan for this patient.    Favio Bowens MD, [facility], [facility]    CC:   Calos Ingram M.D.

## 2025-08-04 ENCOUNTER — TELEPHONE (OUTPATIENT)
Dept: ENDOCRINOLOGY | Facility: MEDICAL CENTER | Age: 70
End: 2025-08-04
Payer: MEDICARE

## 2025-08-04 ENCOUNTER — PATIENT MESSAGE (OUTPATIENT)
Dept: ENDOCRINOLOGY | Facility: MEDICAL CENTER | Age: 70
End: 2025-08-04
Payer: MEDICARE